# Patient Record
Sex: FEMALE | Race: WHITE | NOT HISPANIC OR LATINO | Employment: FULL TIME | URBAN - METROPOLITAN AREA
[De-identification: names, ages, dates, MRNs, and addresses within clinical notes are randomized per-mention and may not be internally consistent; named-entity substitution may affect disease eponyms.]

---

## 2017-01-04 ENCOUNTER — OFFICE VISIT - HEALTHEAST (OUTPATIENT)
Dept: FAMILY MEDICINE | Facility: CLINIC | Age: 37
End: 2017-01-04

## 2017-01-04 ASSESSMENT — MIFFLIN-ST. JEOR: SCORE: 1344.46

## 2017-01-05 ENCOUNTER — COMMUNICATION - HEALTHEAST (OUTPATIENT)
Dept: LAB | Facility: CLINIC | Age: 37
End: 2017-01-05

## 2017-01-24 ENCOUNTER — COMMUNICATION - HEALTHEAST (OUTPATIENT)
Dept: FAMILY MEDICINE | Facility: CLINIC | Age: 37
End: 2017-01-24

## 2017-01-24 ENCOUNTER — AMBULATORY - HEALTHEAST (OUTPATIENT)
Dept: FAMILY MEDICINE | Facility: CLINIC | Age: 37
End: 2017-01-24

## 2017-01-24 ENCOUNTER — AMBULATORY - HEALTHEAST (OUTPATIENT)
Dept: LAB | Facility: CLINIC | Age: 37
End: 2017-01-24

## 2017-01-24 DIAGNOSIS — R82.90 ABNORMAL URINALYSIS: ICD-10-CM

## 2017-01-24 DIAGNOSIS — R31.9 HEMATURIA: ICD-10-CM

## 2017-02-08 ENCOUNTER — AMBULATORY - HEALTHEAST (OUTPATIENT)
Dept: LAB | Facility: CLINIC | Age: 37
End: 2017-02-08

## 2017-02-08 DIAGNOSIS — R82.90 ABNORMAL URINALYSIS: ICD-10-CM

## 2017-03-10 ENCOUNTER — COMMUNICATION - HEALTHEAST (OUTPATIENT)
Dept: FAMILY MEDICINE | Facility: CLINIC | Age: 37
End: 2017-03-10

## 2017-05-22 ENCOUNTER — OFFICE VISIT - HEALTHEAST (OUTPATIENT)
Dept: FAMILY MEDICINE | Facility: CLINIC | Age: 37
End: 2017-05-22

## 2017-05-22 ENCOUNTER — COMMUNICATION - HEALTHEAST (OUTPATIENT)
Dept: FAMILY MEDICINE | Facility: CLINIC | Age: 37
End: 2017-05-22

## 2017-05-22 DIAGNOSIS — R59.1 LYMPHADENOPATHY: ICD-10-CM

## 2017-05-22 DIAGNOSIS — H65.90 SEROUS OTITIS MEDIA: ICD-10-CM

## 2017-05-22 ASSESSMENT — MIFFLIN-ST. JEOR: SCORE: 1344.01

## 2017-12-08 ENCOUNTER — COMMUNICATION - HEALTHEAST (OUTPATIENT)
Dept: FAMILY MEDICINE | Facility: CLINIC | Age: 37
End: 2017-12-08

## 2018-01-05 ENCOUNTER — COMMUNICATION - HEALTHEAST (OUTPATIENT)
Dept: FAMILY MEDICINE | Facility: CLINIC | Age: 38
End: 2018-01-05

## 2018-03-06 ENCOUNTER — COMMUNICATION - HEALTHEAST (OUTPATIENT)
Dept: TELEHEALTH | Facility: CLINIC | Age: 38
End: 2018-03-06

## 2018-03-06 ENCOUNTER — COMMUNICATION - HEALTHEAST (OUTPATIENT)
Dept: HEALTH INFORMATION MANAGEMENT | Facility: CLINIC | Age: 38
End: 2018-03-06

## 2018-05-04 ENCOUNTER — OFFICE VISIT - HEALTHEAST (OUTPATIENT)
Dept: FAMILY MEDICINE | Facility: CLINIC | Age: 38
End: 2018-05-04

## 2018-05-04 DIAGNOSIS — N39.0 ACUTE UTI (URINARY TRACT INFECTION): ICD-10-CM

## 2018-06-25 ENCOUNTER — COMMUNICATION - HEALTHEAST (OUTPATIENT)
Dept: FAMILY MEDICINE | Facility: CLINIC | Age: 38
End: 2018-06-25

## 2018-06-26 ENCOUNTER — OFFICE VISIT - HEALTHEAST (OUTPATIENT)
Dept: FAMILY MEDICINE | Facility: CLINIC | Age: 38
End: 2018-06-26

## 2018-06-26 DIAGNOSIS — Z34.90 SUPERVISION OF NORMAL PREGNANCY: ICD-10-CM

## 2018-06-26 DIAGNOSIS — N91.2 AMENORRHEA: ICD-10-CM

## 2018-06-26 LAB — HCG UR QL: POSITIVE

## 2018-07-03 ENCOUNTER — COMMUNICATION - HEALTHEAST (OUTPATIENT)
Dept: FAMILY MEDICINE | Facility: CLINIC | Age: 38
End: 2018-07-03

## 2018-07-03 ENCOUNTER — PRENATAL OFFICE VISIT - HEALTHEAST (OUTPATIENT)
Dept: FAMILY MEDICINE | Facility: CLINIC | Age: 38
End: 2018-07-03

## 2018-07-03 ENCOUNTER — HOSPITAL ENCOUNTER (OUTPATIENT)
Dept: ULTRASOUND IMAGING | Facility: CLINIC | Age: 38
Discharge: HOME | End: 2018-07-03
Attending: FAMILY MEDICINE

## 2018-07-03 DIAGNOSIS — O20.9 VAGINAL BLEEDING IN PREGNANCY, FIRST TRIMESTER: ICD-10-CM

## 2018-07-03 LAB — HCG SERPL-ACNC: ABNORMAL MLU/ML (ref 0–4)

## 2018-07-05 ENCOUNTER — AMBULATORY - HEALTHEAST (OUTPATIENT)
Dept: LAB | Facility: CLINIC | Age: 38
End: 2018-07-05

## 2018-07-05 DIAGNOSIS — O20.9 VAGINAL BLEEDING IN PREGNANCY, FIRST TRIMESTER: ICD-10-CM

## 2018-07-05 LAB — HCG SERPL-ACNC: ABNORMAL MLU/ML (ref 0–4)

## 2018-07-10 ENCOUNTER — AMBULATORY - HEALTHEAST (OUTPATIENT)
Dept: FAMILY MEDICINE | Facility: CLINIC | Age: 38
End: 2018-07-10

## 2018-07-10 DIAGNOSIS — O20.9 VAGINAL BLEEDING IN PREGNANCY, FIRST TRIMESTER: ICD-10-CM

## 2018-07-18 ENCOUNTER — HOSPITAL ENCOUNTER (OUTPATIENT)
Dept: ULTRASOUND IMAGING | Facility: CLINIC | Age: 38
Discharge: HOME | End: 2018-07-18
Attending: FAMILY MEDICINE

## 2018-07-18 DIAGNOSIS — O20.9 VAGINAL BLEEDING IN PREGNANCY, FIRST TRIMESTER: ICD-10-CM

## 2018-07-20 ENCOUNTER — COMMUNICATION - HEALTHEAST (OUTPATIENT)
Dept: SCHEDULING | Facility: CLINIC | Age: 38
End: 2018-07-20

## 2018-07-26 ENCOUNTER — COMMUNICATION - HEALTHEAST (OUTPATIENT)
Dept: FAMILY MEDICINE | Facility: CLINIC | Age: 38
End: 2018-07-26

## 2018-08-03 ENCOUNTER — PRENATAL OFFICE VISIT - HEALTHEAST (OUTPATIENT)
Dept: FAMILY MEDICINE | Facility: CLINIC | Age: 38
End: 2018-08-03

## 2018-08-03 DIAGNOSIS — Z34.90 SUPERVISION OF NORMAL PREGNANCY: ICD-10-CM

## 2018-08-03 LAB
ALBUMIN UR-MCNC: ABNORMAL MG/DL
APPEARANCE UR: CLEAR
BASOPHILS # BLD AUTO: 0 THOU/UL (ref 0–0.2)
BASOPHILS NFR BLD AUTO: 0 % (ref 0–2)
BILIRUB UR QL STRIP: NEGATIVE
COLOR UR AUTO: YELLOW
EOSINOPHIL # BLD AUTO: 0.1 THOU/UL (ref 0–0.4)
EOSINOPHIL NFR BLD AUTO: 1 % (ref 0–6)
ERYTHROCYTE [DISTWIDTH] IN BLOOD BY AUTOMATED COUNT: 13.1 % (ref 11–14.5)
GLUCOSE UR STRIP-MCNC: NEGATIVE MG/DL
HCT VFR BLD AUTO: 37.7 % (ref 35–47)
HGB BLD-MCNC: 12.2 G/DL (ref 12–16)
HGB UR QL STRIP: ABNORMAL
HIV 1+2 AB+HIV1 P24 AG SERPL QL IA: NEGATIVE
KETONES UR STRIP-MCNC: NEGATIVE MG/DL
LEUKOCYTE ESTERASE UR QL STRIP: NEGATIVE
LYMPHOCYTES # BLD AUTO: 1.5 THOU/UL (ref 0.8–4.4)
LYMPHOCYTES NFR BLD AUTO: 24 % (ref 20–40)
MCH RBC QN AUTO: 28.8 PG (ref 27–34)
MCHC RBC AUTO-ENTMCNC: 32.4 G/DL (ref 32–36)
MCV RBC AUTO: 89 FL (ref 80–100)
MONOCYTES # BLD AUTO: 0.5 THOU/UL (ref 0–0.9)
MONOCYTES NFR BLD AUTO: 8 % (ref 2–10)
NEUTROPHILS # BLD AUTO: 4.3 THOU/UL (ref 2–7.7)
NEUTROPHILS NFR BLD AUTO: 68 % (ref 50–70)
NITRATE UR QL: NEGATIVE
PH UR STRIP: 6.5 [PH] (ref 5–8)
PLATELET # BLD AUTO: 300 THOU/UL (ref 140–440)
PMV BLD AUTO: 10.4 FL (ref 8.5–12.5)
RBC # BLD AUTO: 4.24 MILL/UL (ref 3.8–5.4)
SP GR UR STRIP: >=1.03 (ref 1–1.03)
TSH SERPL DL<=0.005 MIU/L-ACNC: 0.14 UIU/ML (ref 0.3–5)
UROBILINOGEN UR STRIP-ACNC: ABNORMAL
WBC: 6.4 THOU/UL (ref 4–11)

## 2018-08-04 LAB
ANTIBODY SCREEN: NEGATIVE
BACTERIA SPEC CULT: NO GROWTH
HBV SURFACE AG SERPL QL IA: NEGATIVE
T PALLIDUM AB SER QL: NEGATIVE

## 2018-08-06 LAB
25(OH)D3 SERPL-MCNC: 23.2 NG/ML (ref 30–80)
25(OH)D3 SERPL-MCNC: 23.2 NG/ML (ref 30–80)
ABO/RH(D): NORMAL
ABORH REPEAT: NORMAL
C TRACH DNA SPEC QL PROBE+SIG AMP: NEGATIVE
HPV SOURCE: NORMAL
HUMAN PAPILLOMA VIRUS 16 DNA: NEGATIVE
HUMAN PAPILLOMA VIRUS 18 DNA: NEGATIVE
HUMAN PAPILLOMA VIRUS FINAL DIAGNOSIS: NORMAL
HUMAN PAPILLOMA VIRUS OTHER HR: NEGATIVE
N GONORRHOEA DNA SPEC QL NAA+PROBE: NEGATIVE
RUBV IGG SERPL QL IA: POSITIVE
SPECIMEN DESCRIPTION: NORMAL

## 2018-09-05 ENCOUNTER — PRENATAL OFFICE VISIT - HEALTHEAST (OUTPATIENT)
Dept: FAMILY MEDICINE | Facility: CLINIC | Age: 38
End: 2018-09-05

## 2018-09-05 ENCOUNTER — RECORDS - HEALTHEAST (OUTPATIENT)
Dept: ADMINISTRATIVE | Facility: OTHER | Age: 38
End: 2018-09-05

## 2018-09-05 DIAGNOSIS — R94.6 ABNORMAL FINDING ON THYROID FUNCTION TEST: ICD-10-CM

## 2018-09-05 DIAGNOSIS — Z34.90 SUPERVISION OF NORMAL PREGNANCY: ICD-10-CM

## 2018-09-05 LAB
ALBUMIN UR-MCNC: NEGATIVE MG/DL
APPEARANCE UR: CLEAR
BILIRUB UR QL STRIP: NEGATIVE
COLOR UR AUTO: YELLOW
GLUCOSE UR STRIP-MCNC: NEGATIVE MG/DL
HGB UR QL STRIP: NEGATIVE
KETONES UR STRIP-MCNC: NEGATIVE MG/DL
LEUKOCYTE ESTERASE UR QL STRIP: NEGATIVE
NITRATE UR QL: NEGATIVE
PH UR STRIP: 6 [PH] (ref 5–8)
SP GR UR STRIP: >=1.03 (ref 1–1.03)
UROBILINOGEN UR STRIP-ACNC: NORMAL

## 2018-09-06 ENCOUNTER — TRANSFERRED RECORDS (OUTPATIENT)
Dept: HEALTH INFORMATION MANAGEMENT | Facility: CLINIC | Age: 38
End: 2018-09-06

## 2018-09-06 LAB — TSH SERPL DL<=0.005 MIU/L-ACNC: 1.25 UIU/ML (ref 0.3–5)

## 2018-09-12 ENCOUNTER — COMMUNICATION - HEALTHEAST (OUTPATIENT)
Dept: FAMILY MEDICINE | Facility: CLINIC | Age: 38
End: 2018-09-12

## 2018-09-19 ENCOUNTER — PRE VISIT (OUTPATIENT)
Dept: MATERNAL FETAL MEDICINE | Facility: CLINIC | Age: 38
End: 2018-09-19

## 2018-09-25 ENCOUNTER — OFFICE VISIT (OUTPATIENT)
Dept: MATERNAL FETAL MEDICINE | Facility: CLINIC | Age: 38
End: 2018-09-25
Attending: FAMILY MEDICINE
Payer: COMMERCIAL

## 2018-09-25 ENCOUNTER — HOSPITAL ENCOUNTER (OUTPATIENT)
Dept: ULTRASOUND IMAGING | Facility: CLINIC | Age: 38
Discharge: HOME | End: 2018-09-25
Attending: FAMILY MEDICINE | Admitting: FAMILY MEDICINE
Payer: COMMERCIAL

## 2018-09-25 ENCOUNTER — RECORDS - HEALTHEAST (OUTPATIENT)
Dept: ADMINISTRATIVE | Facility: OTHER | Age: 38
End: 2018-09-25

## 2018-09-25 DIAGNOSIS — O26.90 PREGNANCY RELATED CONDITION, ANTEPARTUM: ICD-10-CM

## 2018-09-25 DIAGNOSIS — O09.522 SUPERVISION OF ELDERLY MULTIGRAVIDA IN SECOND TRIMESTER: Primary | ICD-10-CM

## 2018-09-25 DIAGNOSIS — O44.02 PLACENTA PREVIA IN SECOND TRIMESTER: ICD-10-CM

## 2018-09-25 DIAGNOSIS — O09.522 ELDERLY MULTIGRAVIDA IN SECOND TRIMESTER: Primary | ICD-10-CM

## 2018-09-25 PROCEDURE — 96040 ZZH GENETIC COUNSELING, EACH 30 MINUTES: CPT | Mod: ZF | Performed by: GENETIC COUNSELOR, MS

## 2018-09-25 PROCEDURE — 76811 OB US DETAILED SNGL FETUS: CPT

## 2018-09-25 NOTE — PROGRESS NOTES
"Please see \"Imaging\" tab under \"Chart Review\" for details of today's visit.    Honorio Fitzgerald    "

## 2018-09-25 NOTE — MR AVS SNAPSHOT
"              After Visit Summary   2018    Yeimy Blackmon    MRN: [id number]           Patient Information     Date Of Birth          1980        Visit Information        Provider Department      2018 2:45 PM Honorio Fitzgerald MD [facility] Maternal Fetal Medicine [facility]        Today's Diagnoses     Elderly multigravida in second trimester    -  1    Placenta previa in second trimester           Follow-ups after your visit        Who to contact     If you have questions or need follow up information about today's clinic visit or your schedule please contact [facility] MATERNAL FETAL MEDICINE [facility] directly at [phone].  Normal or non-critical lab and imaging results will be communicated to you by Blog Talk Radiohart, letter or phone within 4 business days after the clinic has received the results. If you do not hear from us within 7 days, please contact the clinic through Dandeliont or phone. If you have a critical or abnormal lab result, we will notify you by phone as soon as possible.  Submit refill requests through PlusFourSix or call your pharmacy and they will forward the refill request to us. Please allow 3 business days for your refill to be completed.          Additional Information About Your Visit        MyChart Information     [email or web address] lets you send messages to your doctor, view your test results, renew your prescriptions, schedule appointments and more. To sign up, go to [email or web address] . Click on \"Log in\" on the left side of the screen, which will take you to the Welcome page. Then click on \"Sign up Now\" on the right side of the page.     You will be asked to enter the access code listed below, as well as some personal information. Please follow the directions to create your username and password.     Your access code is: [email or web address]  Expires: 2018 12:31 PM     Your access code will  in 90 days. If you need help or a new code, please call your [facility] clinic or [phone].      "   Care EveryWhere ID     This is your Care EveryWhere ID. This could be used by other organizations to access your [place] medical records  [id number]        Your Vitals Were     Last Period                   05/16/2018            Blood Pressure from Last 3 Encounters:   No data found for BP    Weight from Last 3 Encounters:   No data found for Wt              Today, you had the following     No orders found for display       Primary Care Provider Office Phone # Fax #    [facility] [phone] [address] [id number]        Equal Access to Services     [facility] : Hadii aad ku hadasho Soomaali, waaxda luqadaha, qaybta kaalmada adeegyada, waxtena cornelius . So [facility] [place] [phone].    ATENCIÓN: Si habla español, tiene a shanks disposición servicios gratuitos de asistencia lingüística. Llame al [phone].    We comply with applicable federal civil rights laws and [place] laws. We do not discriminate on the basis of race, color, national origin, age, disability, sex, sexual orientation, or gender identity.            Thank you!     Thank you for choosing [facility]  for your care. Our goal is always to provide you with excellent care. Hearing back from our patients is one way we can continue to improve our services. Please take a few minutes to complete the written survey that you may receive in the mail after your visit with us. Thank you!             Your Updated Medication List - Protect others around you: Learn how to safely use, store and throw away your medicines at www.disposemymeds.org.      Notice  As of 9/25/2018  5:14 PM    You have not been prescribed any medications.

## 2018-09-25 NOTE — PROGRESS NOTES
"[facility]  Genetic Counseling Consult    Patient:  Yeimy Blackmon YOB: 1980   Date of Service:  18      Yeimy Blackmon was seen at the [facility] for genetic consultation as part of her appointment for comprehensive ultrasound.  The indication for genetic counseling is advanced maternal age.  Yeimy was accompanied to her appointment today by her  Carl.       Impression/Plan:   1. Yeimy has declined serum screening in this pregnancy.    2. Yeimy had a comprehensive (level II) ultrasound today, the results of which were normal.  Please see the ultrasound report for further details.    Pregnancy History:   /Parity:    Age at Delivery: 38 year old  DANIEL: 2019, by Ultrasound  Gestational Age: 19w3d    No significant complications or exposures were reported in the current pregnancy.    Yeimy hernandez pregnancy history is significant for 1 prior full term pregnancy with no reported complications.    Medical History:   Yeimy hernandez reported medical history is not expected to impact pregnancy management or risks to fetal development.       Family History:   A three-generation pedigree was obtained, and is scanned under the  Media  tab.   The following significant findings were reported by Yeimy:    Carl's brother was born with reported \"water on the brain\" requiring unknown procedures to address.  He is currently 35 years old and healthy with no known complications.  We discussed that \"water on the brain\" can be referring to an enlargement of the fluid filled spaces within the brain, which results in pressure on the surrounding brain tissue.  Treatments can include procedures things like shunts to allow the excess fluid to drain.  We discussed that these sorts of physical differences can be associated with genetic conditions and also occur completely sporadically, without an underlying genetic cause.  We " discussed that because Carl's brother is otherwise healthy with no known complications, it is possible that his condition was a sporadic occurrence.  We discussed that the risks for Yeimy's ongoing pregnancy to be similarly affected is likely low, and that today's ultrasound will assess for excess fluid accumulation in the brain.      Carl's sister had a son die at approximately 2 months of age due to complications of extreme prematurity, born at ~26 weeks gestational age.  We discussed that there are many factors that can cause early delivery, but that Yeimy's pregnancy is not considered increased risk for premature delivery based on this history.      Otherwise, the reported family history is negative for multiple miscarriages, stillbirths, birth defects, cognitive impairment, known genetic conditions, and consanguinity.       Carrier Screening:       Carrier screening was not discussed today.       Risk Assessment for Chromosome Conditions:   We explained that the risk for fetal chromosome abnormalities increases with maternal age. We discussed specific features of common chromosome abnormalities, including Down syndrome, trisomy 13, trisomy 18, and sex chromosome trisomies.      - At age 38 at midtrimester, the risk to have a baby with Down syndrome is 1 in 168.     - At age 38 at midtrimester, the risk to have a baby with any chromosome abnormality is 1 in 82.     - At age 38 at delivery, the risk to have a baby with Down syndrome is 1 in 227.    - At age 38 at delivery, the risk to have a baby with any chromosome abnormality is 1 in 129.       Yeimy did not have maternal serum screening earlier in pregnancy.      Testing Options:   We discussed the following options:   Non-invasive Prenatal Testing (NIPT)    Maternal plasma cell-free DNA testing; first trimester ultrasound with nuchal translucency and nasal bone assessment is recommended, when appropriate    Screens for fetal trisomy 21, trisomy 13, trisomy  18, and sex chromosome aneuploidy    Cannot screen for open neural tube defects; maternal serum AFP after 15 weeks is recommended       Genetic Amniocentesis    Invasive procedure typically performed in the second trimester by which amniotic fluid is obtained for the purpose of chromosome analysis and/or other prenatal genetic analysis    Diagnostic results; >99% sensitivity for fetal chromosome abnormalities    AFAFP measurement tests for open neural tube defects       Comprehensive (Level II) ultrasound:     Detailed ultrasound performed between 18-22 weeks gestation    Screen for major birth defects and markers for aneuploidy    We reviewed the benefits and limitations of this testing.  Screening tests provide a risk assessment specific to the pregnancy for certain fetal chromosome abnormalities, but cannot definitively diagnose or exclude a fetal chromosome abnormality.  Follow-up genetic counseling and consideration of diagnostic testing is recommended with any abnormal screening result. Diagnostic tests carry inherent risks- including risk of miscarriage- that require careful consideration.  These tests can detect fetal chromosome abnormalities with greater than 99% certainty.  Results can be compromised by maternal cell contamination or mosaicism, and are limited by the resolution of cytogenetic G-banding technology.  There is no screening nor diagnostic test that can detect all forms of birth defects or mental disability.    It was a pleasure to be involved with Yeimy [facility]. Face-to-face time of the meeting was 25 minutes.    Brian Blount MS, [facility]  Licensed Genetic Counselor  Phone: [phone]  Pager: [phone]

## 2018-09-25 NOTE — MR AVS SNAPSHOT
"              After Visit Summary   2018    Yeimy Blackmon    MRN: [id number]           Patient Information     Date Of Birth          1980        Visit Information        Provider Department      2018 1:30 PM Brian Blount [facility] Maternal Fetal Medicine [facility]        Today's Diagnoses     Supervision of elderly multigravida in second trimester    -  1    Pregnancy related condition, antepartum           Follow-ups after your visit        Who to contact     If you have questions or need follow up information about today's clinic visit or your schedule please contact [facility] MATERNAL FETAL MEDICINE [facility] directly at [phone].  Normal or non-critical lab and imaging results will be communicated to you by ABOVE Solutionshart, letter or phone within 4 business days after the clinic has received the results. If you do not hear from us within 7 days, please contact the clinic through ABOVE Solutionshart or phone. If you have a critical or abnormal lab result, we will notify you by phone as soon as possible.  Submit refill requests through Tiger Pistol or call your pharmacy and they will forward the refill request to us. Please allow 3 business days for your refill to be completed.          Additional Information About Your Visit        MyChart Information     Tiger Pistol lets you send messages to your doctor, view your test results, renew your prescriptions, schedule appointments and more. To sign up, go to [email or web address] . Click on \"Log in\" on the left side of the screen, which will take you to the Welcome page. Then click on \"Sign up Now\" on the right side of the page.     You will be asked to enter the access code listed below, as well as some personal information. Please follow the directions to create your username and password.     Your access code is: [email or web address]  Expires: 2018 12:31 PM     Your access code will  in 90 days. If you need help or a new code, please call your [place] clinic " or [phone].        Care EveryWhere ID     This is your Care EveryWhere ID. This could be used by other organizations to access your [place] medical records  [id number]        Your Vitals Were     Last Period                   05/16/2018            Blood Pressure from Last 3 Encounters:   No data found for BP    Weight from Last 3 Encounters:   No data found for Wt              We Performed the Following     [facility] Genetic Counseling        Primary Care Provider Office Phone # Fax #    [facility] [phone] [address] [id number]        Equal Access to Services     GAMA TAPIA : Hadii aad ku hadasho Soomaali, waaxda luqadaha, qaybta kaalmada adeevanyaevan, radha cornelius . So [facility] [phone].    ATENCIÓN: Si habla español, tiene a shanks disposición servicios gratuitos de asistencia lingüística. Llame al [phone].    We comply with applicable federal civil rights laws and [place] laws. We do not discriminate on the basis of race, color, national origin, age, disability, sex, sexual orientation, or gender identity.            Thank you!     Thank you for choosing [facility]  for your care. Our goal is always to provide you with excellent care. Hearing back from our patients is one way we can continue to improve our services. Please take a few minutes to complete the written survey that you may receive in the mail after your visit with us. Thank you!             Your Updated Medication List - Protect others around you: Learn how to safely use, store and throw away your medicines at www.disposemymeds.org.      Notice  As of 9/25/2018  3:38 PM    You have not been prescribed any medications.

## 2018-09-26 ENCOUNTER — COMMUNICATION - HEALTHEAST (OUTPATIENT)
Dept: FAMILY MEDICINE | Facility: CLINIC | Age: 38
End: 2018-09-26

## 2018-10-01 ENCOUNTER — PRENATAL OFFICE VISIT - HEALTHEAST (OUTPATIENT)
Dept: FAMILY MEDICINE | Facility: CLINIC | Age: 38
End: 2018-10-01

## 2018-10-01 DIAGNOSIS — Z34.90 SUPERVISION OF NORMAL PREGNANCY: ICD-10-CM

## 2018-10-29 ENCOUNTER — PRENATAL OFFICE VISIT - HEALTHEAST (OUTPATIENT)
Dept: FAMILY MEDICINE | Facility: CLINIC | Age: 38
End: 2018-10-29

## 2018-10-29 DIAGNOSIS — Z34.90 SUPERVISION OF NORMAL PREGNANCY: ICD-10-CM

## 2018-10-29 DIAGNOSIS — O44.20 MARGINAL PLACENTA PREVIA: ICD-10-CM

## 2018-10-29 LAB
ALBUMIN UR-MCNC: NEGATIVE MG/DL
APPEARANCE UR: CLEAR
BACTERIA #/AREA URNS HPF: ABNORMAL HPF
BILIRUB UR QL STRIP: NEGATIVE
COLOR UR AUTO: YELLOW
FASTING STATUS PATIENT QL REPORTED: ABNORMAL
GLUCOSE 1H P 50 G GLC PO SERPL-MCNC: 153 MG/DL (ref 70–139)
GLUCOSE UR STRIP-MCNC: NEGATIVE MG/DL
HGB BLD-MCNC: 11 G/DL (ref 12–16)
HGB UR QL STRIP: NEGATIVE
KETONES UR STRIP-MCNC: ABNORMAL MG/DL
LEUKOCYTE ESTERASE UR QL STRIP: ABNORMAL
NITRATE UR QL: NEGATIVE
PH UR STRIP: 7 [PH] (ref 5–8)
RBC #/AREA URNS AUTO: ABNORMAL HPF
SP GR UR STRIP: 1.02 (ref 1–1.03)
SQUAMOUS #/AREA URNS AUTO: ABNORMAL LPF
UROBILINOGEN UR STRIP-ACNC: ABNORMAL
WBC #/AREA URNS AUTO: ABNORMAL HPF

## 2018-10-30 ENCOUNTER — AMBULATORY - HEALTHEAST (OUTPATIENT)
Dept: FAMILY MEDICINE | Facility: CLINIC | Age: 38
End: 2018-10-30

## 2018-10-30 DIAGNOSIS — Z34.90 SUPERVISION OF NORMAL PREGNANCY: ICD-10-CM

## 2018-10-30 LAB — BACTERIA SPEC CULT: NO GROWTH

## 2018-10-31 LAB
25(OH)D3 SERPL-MCNC: 29.1 NG/ML (ref 30–80)
25(OH)D3 SERPL-MCNC: 29.1 NG/ML (ref 30–80)

## 2018-11-02 ENCOUNTER — AMBULATORY - HEALTHEAST (OUTPATIENT)
Dept: LAB | Facility: CLINIC | Age: 38
End: 2018-11-02

## 2018-11-02 DIAGNOSIS — Z34.90 SUPERVISION OF NORMAL PREGNANCY: ICD-10-CM

## 2018-11-02 LAB
FASTING STATUS PATIENT QL REPORTED: NORMAL
GLUCOSE 1H P 100 G GLC PO SERPL-MCNC: 113 MG/DL
GLUCOSE 2H P 100 G GLC PO SERPL-MCNC: 89 MG/DL
GLUCOSE 3H P 100 G GLC PO SERPL-MCNC: 34 MG/DL
GLUCOSE P FAST SERPL-MCNC: 76 MG/DL

## 2018-11-14 ENCOUNTER — PRENATAL OFFICE VISIT - HEALTHEAST (OUTPATIENT)
Dept: FAMILY MEDICINE | Facility: CLINIC | Age: 38
End: 2018-11-14

## 2018-11-14 DIAGNOSIS — O44.20 MARGINAL PLACENTA PREVIA: ICD-10-CM

## 2018-11-14 DIAGNOSIS — Z34.83 ENCOUNTER FOR SUPERVISION OF OTHER NORMAL PREGNANCY IN THIRD TRIMESTER: ICD-10-CM

## 2018-11-28 ENCOUNTER — HOSPITAL ENCOUNTER (OUTPATIENT)
Dept: ULTRASOUND IMAGING | Facility: CLINIC | Age: 38
Discharge: HOME | End: 2018-11-28
Attending: FAMILY MEDICINE

## 2018-11-28 ENCOUNTER — PRENATAL OFFICE VISIT - HEALTHEAST (OUTPATIENT)
Dept: FAMILY MEDICINE | Facility: CLINIC | Age: 38
End: 2018-11-28

## 2018-11-28 DIAGNOSIS — Z34.83 ENCOUNTER FOR SUPERVISION OF OTHER NORMAL PREGNANCY IN THIRD TRIMESTER: ICD-10-CM

## 2018-11-28 DIAGNOSIS — Z34.90 SUPERVISION OF NORMAL PREGNANCY: ICD-10-CM

## 2018-11-28 DIAGNOSIS — O44.20 MARGINAL PLACENTA PREVIA: ICD-10-CM

## 2018-11-29 LAB — T PALLIDUM AB SER QL: NEGATIVE

## 2018-12-12 ENCOUNTER — PRENATAL OFFICE VISIT - HEALTHEAST (OUTPATIENT)
Dept: FAMILY MEDICINE | Facility: CLINIC | Age: 38
End: 2018-12-12

## 2018-12-12 DIAGNOSIS — Z34.83 ENCOUNTER FOR SUPERVISION OF OTHER NORMAL PREGNANCY IN THIRD TRIMESTER: ICD-10-CM

## 2018-12-26 ENCOUNTER — PRENATAL OFFICE VISIT - HEALTHEAST (OUTPATIENT)
Dept: FAMILY MEDICINE | Facility: CLINIC | Age: 38
End: 2018-12-26

## 2018-12-26 DIAGNOSIS — Z34.83 ENCOUNTER FOR SUPERVISION OF OTHER NORMAL PREGNANCY IN THIRD TRIMESTER: ICD-10-CM

## 2018-12-26 DIAGNOSIS — O44.20 MARGINAL PLACENTA PREVIA: ICD-10-CM

## 2019-01-08 ENCOUNTER — HOSPITAL ENCOUNTER (OUTPATIENT)
Dept: ULTRASOUND IMAGING | Facility: CLINIC | Age: 39
Discharge: HOME | End: 2019-01-08
Attending: FAMILY MEDICINE

## 2019-01-08 DIAGNOSIS — Z34.83 ENCOUNTER FOR SUPERVISION OF OTHER NORMAL PREGNANCY IN THIRD TRIMESTER: ICD-10-CM

## 2019-01-08 DIAGNOSIS — O44.20 MARGINAL PLACENTA PREVIA: ICD-10-CM

## 2019-01-09 ENCOUNTER — PRENATAL OFFICE VISIT - HEALTHEAST (OUTPATIENT)
Dept: FAMILY MEDICINE | Facility: CLINIC | Age: 39
End: 2019-01-09

## 2019-01-09 DIAGNOSIS — Z34.83 ENCOUNTER FOR SUPERVISION OF OTHER NORMAL PREGNANCY IN THIRD TRIMESTER: ICD-10-CM

## 2019-01-09 DIAGNOSIS — O44.20 MARGINAL PLACENTA: ICD-10-CM

## 2019-01-11 ENCOUNTER — COMMUNICATION - HEALTHEAST (OUTPATIENT)
Dept: FAMILY MEDICINE | Facility: CLINIC | Age: 39
End: 2019-01-11

## 2019-01-11 DIAGNOSIS — Z34.83 ENCOUNTER FOR SUPERVISION OF OTHER NORMAL PREGNANCY IN THIRD TRIMESTER: ICD-10-CM

## 2019-01-16 ENCOUNTER — RECORDS - HEALTHEAST (OUTPATIENT)
Dept: ADMINISTRATIVE | Facility: OTHER | Age: 39
End: 2019-01-16

## 2019-01-23 ENCOUNTER — RECORDS - HEALTHEAST (OUTPATIENT)
Dept: FAMILY MEDICINE | Facility: CLINIC | Age: 39
End: 2019-01-23

## 2019-01-23 ENCOUNTER — COMMUNICATION - HEALTHEAST (OUTPATIENT)
Dept: FAMILY MEDICINE | Facility: CLINIC | Age: 39
End: 2019-01-23

## 2019-01-25 ENCOUNTER — PRENATAL OFFICE VISIT - HEALTHEAST (OUTPATIENT)
Dept: FAMILY MEDICINE | Facility: CLINIC | Age: 39
End: 2019-01-25

## 2019-01-25 DIAGNOSIS — Z34.83 ENCOUNTER FOR SUPERVISION OF OTHER NORMAL PREGNANCY IN THIRD TRIMESTER: ICD-10-CM

## 2019-01-25 LAB — HGB BLD-MCNC: 11.9 G/DL (ref 12–16)

## 2019-01-27 LAB
ALLERGIC TO PENICILLIN: NO
GP B STREP DNA SPEC QL NAA+PROBE: NEGATIVE

## 2019-01-30 ENCOUNTER — PRENATAL OFFICE VISIT - HEALTHEAST (OUTPATIENT)
Dept: FAMILY MEDICINE | Facility: CLINIC | Age: 39
End: 2019-01-30

## 2019-01-30 DIAGNOSIS — Z34.83 ENCOUNTER FOR SUPERVISION OF OTHER NORMAL PREGNANCY IN THIRD TRIMESTER: ICD-10-CM

## 2019-02-03 ENCOUNTER — ANESTHESIA - HEALTHEAST (OUTPATIENT)
Dept: OBGYN | Facility: CLINIC | Age: 39
End: 2019-02-03

## 2019-02-03 ENCOUNTER — COMMUNICATION - HEALTHEAST (OUTPATIENT)
Dept: SCHEDULING | Facility: CLINIC | Age: 39
End: 2019-02-03

## 2019-02-11 ENCOUNTER — COMMUNICATION - HEALTHEAST (OUTPATIENT)
Dept: FAMILY MEDICINE | Facility: CLINIC | Age: 39
End: 2019-02-11

## 2019-02-13 ENCOUNTER — COMMUNICATION - HEALTHEAST (OUTPATIENT)
Dept: FAMILY MEDICINE | Facility: CLINIC | Age: 39
End: 2019-02-13

## 2019-02-21 ENCOUNTER — AMBULATORY - HEALTHEAST (OUTPATIENT)
Dept: PEDIATRICS | Facility: CLINIC | Age: 39
End: 2019-02-21

## 2019-02-23 ENCOUNTER — AMBULATORY - HEALTHEAST (OUTPATIENT)
Dept: PEDIATRICS | Facility: CLINIC | Age: 39
End: 2019-02-23

## 2019-03-04 ENCOUNTER — COMMUNICATION - HEALTHEAST (OUTPATIENT)
Dept: FAMILY MEDICINE | Facility: CLINIC | Age: 39
End: 2019-03-04

## 2019-03-04 DIAGNOSIS — B37.9 YEAST INFECTION: ICD-10-CM

## 2019-03-26 ENCOUNTER — AMBULATORY - HEALTHEAST (OUTPATIENT)
Dept: FAMILY MEDICINE | Facility: CLINIC | Age: 39
End: 2019-03-26

## 2019-03-26 ENCOUNTER — OFFICE VISIT - HEALTHEAST (OUTPATIENT)
Dept: FAMILY MEDICINE | Facility: CLINIC | Age: 39
End: 2019-03-26

## 2019-03-26 DIAGNOSIS — R82.90 ABNORMAL URINALYSIS: ICD-10-CM

## 2019-03-26 DIAGNOSIS — Z30.011 ENCOUNTER FOR INITIAL PRESCRIPTION OF CONTRACEPTIVE PILLS: ICD-10-CM

## 2019-03-26 LAB
ALBUMIN UR-MCNC: ABNORMAL MG/DL
APPEARANCE UR: CLEAR
BILIRUB UR QL STRIP: NEGATIVE
COLOR UR AUTO: YELLOW
GLUCOSE UR STRIP-MCNC: NEGATIVE MG/DL
HGB BLD-MCNC: 12.6 G/DL (ref 12–16)
HGB UR QL STRIP: ABNORMAL
KETONES UR STRIP-MCNC: ABNORMAL MG/DL
LEUKOCYTE ESTERASE UR QL STRIP: ABNORMAL
NITRATE UR QL: NEGATIVE
PH UR STRIP: 5.5 [PH] (ref 5–8)
SP GR UR STRIP: 1.02 (ref 1–1.03)
UROBILINOGEN UR STRIP-ACNC: ABNORMAL

## 2019-03-28 LAB
HPV SOURCE: NORMAL
HUMAN PAPILLOMA VIRUS 16 DNA: NEGATIVE
HUMAN PAPILLOMA VIRUS 18 DNA: NEGATIVE
HUMAN PAPILLOMA VIRUS FINAL DIAGNOSIS: NORMAL
HUMAN PAPILLOMA VIRUS OTHER HR: NEGATIVE
SPECIMEN DESCRIPTION: NORMAL

## 2019-04-19 ENCOUNTER — COMMUNICATION - HEALTHEAST (OUTPATIENT)
Dept: FAMILY MEDICINE | Facility: CLINIC | Age: 39
End: 2019-04-19

## 2019-06-05 ENCOUNTER — RECORDS - HEALTHEAST (OUTPATIENT)
Dept: ADMINISTRATIVE | Facility: OTHER | Age: 39
End: 2019-06-05

## 2019-07-10 ENCOUNTER — RECORDS - HEALTHEAST (OUTPATIENT)
Dept: ADMINISTRATIVE | Facility: OTHER | Age: 39
End: 2019-07-10

## 2019-09-11 ENCOUNTER — RECORDS - HEALTHEAST (OUTPATIENT)
Dept: ADMINISTRATIVE | Facility: OTHER | Age: 39
End: 2019-09-11

## 2019-10-02 ENCOUNTER — RECORDS - HEALTHEAST (OUTPATIENT)
Dept: ADMINISTRATIVE | Facility: OTHER | Age: 39
End: 2019-10-02

## 2019-12-27 ENCOUNTER — COMMUNICATION - HEALTHEAST (OUTPATIENT)
Dept: FAMILY MEDICINE | Facility: CLINIC | Age: 39
End: 2019-12-27

## 2020-01-07 ENCOUNTER — OFFICE VISIT - HEALTHEAST (OUTPATIENT)
Dept: FAMILY MEDICINE | Facility: CLINIC | Age: 40
End: 2020-01-07

## 2020-01-07 DIAGNOSIS — R30.0 DYSURIA: ICD-10-CM

## 2020-01-07 DIAGNOSIS — N92.6 IRREGULAR MENSES: ICD-10-CM

## 2020-01-07 DIAGNOSIS — M25.539 WRIST PAIN: ICD-10-CM

## 2020-01-07 LAB
ALBUMIN UR-MCNC: NEGATIVE MG/DL
APPEARANCE UR: CLEAR
BACTERIA #/AREA URNS HPF: ABNORMAL HPF
BASOPHILS # BLD AUTO: 0 THOU/UL (ref 0–0.2)
BASOPHILS NFR BLD AUTO: 0 % (ref 0–2)
BILIRUB UR QL STRIP: NEGATIVE
COLOR UR AUTO: YELLOW
EOSINOPHIL # BLD AUTO: 0.1 THOU/UL (ref 0–0.4)
EOSINOPHIL NFR BLD AUTO: 2 % (ref 0–6)
ERYTHROCYTE [DISTWIDTH] IN BLOOD BY AUTOMATED COUNT: 11.5 % (ref 11–14.5)
ERYTHROCYTE [SEDIMENTATION RATE] IN BLOOD BY WESTERGREN METHOD: 2 MM/HR (ref 0–20)
GLUCOSE UR STRIP-MCNC: NEGATIVE MG/DL
HCG UR QL: NEGATIVE
HCT VFR BLD AUTO: 40.8 % (ref 35–47)
HGB BLD-MCNC: 13.5 G/DL (ref 12–16)
HGB UR QL STRIP: ABNORMAL
KETONES UR STRIP-MCNC: NEGATIVE MG/DL
LEUKOCYTE ESTERASE UR QL STRIP: ABNORMAL
LYMPHOCYTES # BLD AUTO: 1.4 THOU/UL (ref 0.8–4.4)
LYMPHOCYTES NFR BLD AUTO: 27 % (ref 20–40)
MCH RBC QN AUTO: 30.6 PG (ref 27–34)
MCHC RBC AUTO-ENTMCNC: 33.2 G/DL (ref 32–36)
MCV RBC AUTO: 92 FL (ref 80–100)
MONOCYTES # BLD AUTO: 0.3 THOU/UL (ref 0–0.9)
MONOCYTES NFR BLD AUTO: 6 % (ref 2–10)
NEUTROPHILS # BLD AUTO: 3.4 THOU/UL (ref 2–7.7)
NEUTROPHILS NFR BLD AUTO: 66 % (ref 50–70)
NITRATE UR QL: NEGATIVE
PH UR STRIP: 6.5 [PH] (ref 5–8)
PLATELET # BLD AUTO: 308 THOU/UL (ref 140–440)
PMV BLD AUTO: 8.6 FL (ref 7–10)
RBC # BLD AUTO: 4.43 MILL/UL (ref 3.8–5.4)
RBC #/AREA URNS AUTO: ABNORMAL HPF
SP GR UR STRIP: 1.01 (ref 1–1.03)
SQUAMOUS #/AREA URNS AUTO: ABNORMAL LPF
UROBILINOGEN UR STRIP-ACNC: ABNORMAL
WBC #/AREA URNS AUTO: ABNORMAL HPF
WBC: 5.2 THOU/UL (ref 4–11)

## 2020-01-07 ASSESSMENT — MIFFLIN-ST. JEOR: SCORE: 1380.75

## 2020-01-08 LAB
ALBUMIN SERPL-MCNC: 4.2 G/DL (ref 3.5–5)
ALP SERPL-CCNC: 56 U/L (ref 45–120)
ALT SERPL W P-5'-P-CCNC: 17 U/L (ref 0–45)
ANION GAP SERPL CALCULATED.3IONS-SCNC: 12 MMOL/L (ref 5–18)
AST SERPL W P-5'-P-CCNC: 13 U/L (ref 0–40)
BILIRUB SERPL-MCNC: 0.3 MG/DL (ref 0–1)
BUN SERPL-MCNC: 13 MG/DL (ref 8–22)
C REACTIVE PROTEIN LHE: 0.2 MG/DL (ref 0–0.8)
CALCIUM SERPL-MCNC: 9.4 MG/DL (ref 8.5–10.5)
CHLORIDE BLD-SCNC: 106 MMOL/L (ref 98–107)
CO2 SERPL-SCNC: 23 MMOL/L (ref 22–31)
CREAT SERPL-MCNC: 0.86 MG/DL (ref 0.6–1.1)
FSH SERPL-ACNC: 19.2 MIU/ML
GFR SERPL CREATININE-BSD FRML MDRD: >60 ML/MIN/1.73M2
GLUCOSE BLD-MCNC: 92 MG/DL (ref 70–125)
LH SERPL-ACNC: 8.2 MIU/ML
POTASSIUM BLD-SCNC: 4.2 MMOL/L (ref 3.5–5)
PROLACTIN SERPL-MCNC: 11.3 NG/ML (ref 0–20)
PROT SERPL-MCNC: 7.2 G/DL (ref 6–8)
RHEUMATOID FACT SERPL-ACNC: <15 IU/ML (ref 0–30)
SODIUM SERPL-SCNC: 141 MMOL/L (ref 136–145)
T3 SERPL-MCNC: 109 NG/DL (ref 45–175)
T4 FREE SERPL-MCNC: 1.1 NG/DL (ref 0.7–1.8)
T4 TOTAL - HISTORICAL: 11.7 UG/DL (ref 4.5–13)
TSH SERPL DL<=0.005 MIU/L-ACNC: 1.55 UIU/ML (ref 0.3–5)
URATE SERPL-MCNC: 4.1 MG/DL (ref 2–7.5)

## 2020-01-09 ENCOUNTER — COMMUNICATION - HEALTHEAST (OUTPATIENT)
Dept: FAMILY MEDICINE | Facility: CLINIC | Age: 40
End: 2020-01-09

## 2020-01-09 DIAGNOSIS — N39.0 URINARY TRACT INFECTION WITHOUT HEMATURIA, SITE UNSPECIFIED: ICD-10-CM

## 2020-01-09 LAB
ANA SER QL: 0.8 U
B BURGDOR IGG+IGM SER QL: 0.21 INDEX VALUE
BACTERIA SPEC CULT: ABNORMAL
DHEA-S SERPL-MCNC: 78 UG/DL (ref 45–270)

## 2020-01-10 LAB — TESTOST SERPL-MCNC: 13 NG/DL (ref 14–53)

## 2020-03-16 ENCOUNTER — VIRTUAL VISIT (OUTPATIENT)
Dept: FAMILY MEDICINE | Facility: OTHER | Age: 40
End: 2020-03-16

## 2020-03-16 NOTE — PROGRESS NOTES
"Date: 2020 08:11:44  Clinician: Elsa Arana  Clinician NPI: [id number]  Patient: Yeimy Blackmon  Patient : 1980  Patient Address: [facility] [address], [place] [address]  Patient Phone: [phone]  Visit Protocol: URI  Patient Summary:  Yeimy is a 39 year old ( : 1980 ) female who initiated a Visit for COVID-19 (Coronavirus) evaluation and screening. When asked the question \"Please sign me up to receive news, health information and promotions. \", Yeimy responded \"No\".    Yeimy states her symptoms started gradually 3-6 days ago.   Her symptoms consist of a headache, rhinitis, facial pain or pressure, a sore throat, and a cough.   Symptom details     Nasal secretions: The color of her mucus is yellow.    Cough: Yeimy coughs every 5-10 minutes and her cough is not more bothersome at night. Phlegm comes into her throat when she coughs. She believes her cough is caused by post-nasal drip. The color of the phlegm is yellow.     Sore throat: Yeimy reports having mild throat pain (1-3 on a 10 point pain scale), does not have exudate on her tonsils, and can swallow liquids. The lymph nodes in her neck are not enlarged. A rash has not appeared on the skin since the sore throat started.     Facial pain or pressure: The facial pain or pressure feels worse when bending over or leaning forward.     Headache: She states the headache is moderate (4-6 on a 10 point pain scale).      Yeimy denies having ear pain, malaise, enlarged lymph nodes, myalgias, fever, chills, wheezing, nasal congestion, and teeth pain. She also denies having recent facial or sinus surgery in the past 60 days, double sickening (worsening symptoms after initial improvement), having a sinus infection within the past year, and taking antibiotic medication for the symptoms. She is not experiencing dyspnea.   Precipitating events  Within the past week, Yeimy has not been exposed to someone with strep throat. She has not " recently been exposed to someone with influenza. Yeimy has been in close contact with the following high risk individuals: children under the age of 5.   Pertinent COVID-19 (Coronavirus) information  Yeimy has not traveled internationally or to the areas where COVID-19 (Coronavirus) is widespread in the last 14 days before the start of her symptoms.   Yeimy has not had close contact with a suspected or laboratory-confirmed COVID-19 patient within 14 days of symptom onset.   Yeimy is not a healthcare worker and does not work in a healthcare facility.   Pertinent medical history  Yeimy does not get yeast infections when she takes antibiotics.   Yeimy does not need a return to work/school note.   Weight: 135 lbs   Yeimy does not smoke or use smokeless tobacco.   She denies pregnancy and denies breastfeeding. She has menstruated in the past month.   Weight: 135 lbs    MEDICATIONS: Tylenol Extra Strength oral, ibuprofen oral, ALLERGIES: NKDA  Clinician Response:  Dear Yeimy,  Based on the information provided, you have a viral upper respiratory infection, otherwise known as a cold. Symptoms vary from person to person, but can include sneezing, coughing, a runny nose, sore throat, and headache and range from mild to severe.  Unfortunately, there are no medications that can cure a cold, so treatment is focused on controlling symptoms as much as possible. Most people gradually feel better until symptoms are gone in 1-2 weeks.  Medication information  Because you have a viral infection, antibiotics will not help you get better. Treating a viral infection with antibiotics could actually make you feel worse.  I am prescribing:     Benzonatate (Tessalon Perles) 100 mg oral capsule. Take 1-2 capsules by mouth 3 times per day as needed for your cough. There are no refills with this prescription.   Unless you are allergic to the over-the-counter medication(s) below, I recommend using:       Acetaminophen (Tylenol or store  brand) oral tablet. Take 1-2 tablets by mouth every 4-6 hours to help with the discomfort.      Ibuprofen (Advil or store brand) 200 mg oral tablet. Take 1-3 tablets (200-600 mg) by mouth every 8 hours to help with the discomfort. Make sure to take the ibuprofen with food. Do not exceed 2400 mg in 24 hours.      Guaifenesin + dextromethorphan (Robitussin DM, Mucinex DM, or store brand).    A sinus irrigation kit such as Sinus Rinse, Neti Pot, SinuCleanse, or store brand. Be sure to use sterile or previously boiled water to prevent unwanted infections.      Oxymetazoline (Afrin or store brand) nasal spray. Use 1 spray in each nostril 2 times a day for a maximum of 3 days. Using this medication more frequently or longer than recommended may cause nasal congestion to reoccur or worsen. Sinus pressure occurs when the tissues lining your sinuses become swollen and inflamed. Afrin nasal spray decreases the swelling to provide the quickest and most effective relief from sinus pressure.      Over-the-counter medications do not require a prescription. Ask the pharmacist if you have any questions.  Self care  The following tips will keep you as comfortable as possible while you recover:     Rest    Drink plenty of water and other liquids    Take a hot shower to loosen congestion    Use throat lozenges    Gargle with warm salt water (1/4 teaspoon of salt per 8 ounce glass of water)    Suck on frozen items such as popsicles or ice cubes    Drink hot tea with lemon and honey    Take a spoonful of honey to reduce your cough     When to seek care  Please be seen in a clinic or urgent care if new symptoms develop, or symptoms become worse.  Call 911 or go to the emergency room if you feel that your throat is closing off, you suddenly develop a rash, you are unable to swallow fluids, you are drooling, or you are having difficulty breathing.  Additional treatment plan   Dear Yeimy,  Based on the information you have provided, it  does not appear you need Coronavirus (COVID-19) testing.   At this time, we recommend testing primarily for those people who have symptoms of cough and fever and have either traveled to a known area of infection or have been exposed to someone with laboratory confirmed Coronavirus by close contact.   Coronavirus - General Information:   The coronavirus infection starts within 14 days of an exposure.  Symptoms are those of a respiratory infection (such as fever, cough).   If you have not had symptoms by day 15, you should be considered uninfected by coronavirus.   Coronavirus - Symptoms:    The coronavirus can cause a respiratory illness, such as bronchitis or pneumonia.  The most common symptoms are: cough, fever, and shortness of breath.   Other symptoms are: body aches, chills, diarrhea, fatigue, headache, runny nose, and sore throat   Coronavirus - Exposure Risk Factors:   Exposure to a person who has been diagnosed with coronavirus.  Travel from an area with recent local transmission of coronavirus.  The CDC (www.cdc.gov) has the most up-to-date list of where the coronavirus outbreak is occurring.   Coronavirus - Spreading:    The virus likely spreads through respiratory droplets produced when a person coughs or sneezes. These respiratory droplets can travel approximately 6 feet and can remain on surfaces. Common disinfectants will kill the virus.  The CDC currently does not recommend healthy people wear masks.   Coronavirus - Protect Yourself:    Avoid close contact with people known to have this new coronavirus infection.  Wash hands often with soap and water or alcohol-based hand .  Avoid touching the eyes, nose or mouth.   Thank you for limiting contact with others, wearing a simple mask to cover your cough, practice good hand hygiene habits and accessing our Art-Exchange services where possible to limit the spread of this virus.  For more information about COVID19 and options for caring for yourself at  home, please visit the CDC website at https://www.cdc.gov/coronavirus/2019-ncov/about/steps-when-sick.html   For more options for care at [facility], please visit our website at [email or web address]     Diagnosis: Cough  Diagnosis ICD: R05  Additional Clinician Notes: If you are not feeling any better after 10 days total of symptoms, please submit a new [facility] visit for recheck.  I hope you feel better soon!  Prescription: benzonatate (Tessalon Perles) 100 mg oral capsule 30 capsule, 5 days supply. Take 1-2 capsules by mouth 3 times per day as needed. Refills: 0, Refill as needed: no, Allow substitutions: yes  Pharmacy: [id number] - [phone] - [address] [place] [address]

## 2020-03-26 ENCOUNTER — COMMUNICATION - HEALTHEAST (OUTPATIENT)
Dept: FAMILY MEDICINE | Facility: CLINIC | Age: 40
End: 2020-03-26

## 2020-03-26 DIAGNOSIS — N92.6 IRREGULAR MENSES: ICD-10-CM

## 2021-02-25 ENCOUNTER — COMMUNICATION - HEALTHEAST (OUTPATIENT)
Dept: FAMILY MEDICINE | Facility: CLINIC | Age: 41
End: 2021-02-25

## 2021-03-01 ENCOUNTER — OFFICE VISIT - HEALTHEAST (OUTPATIENT)
Dept: FAMILY MEDICINE | Facility: CLINIC | Age: 41
End: 2021-03-01

## 2021-03-01 ENCOUNTER — AMBULATORY - HEALTHEAST (OUTPATIENT)
Dept: FAMILY MEDICINE | Facility: CLINIC | Age: 41
End: 2021-03-01

## 2021-03-01 DIAGNOSIS — D64.9 ANEMIA, UNSPECIFIED TYPE: ICD-10-CM

## 2021-03-01 DIAGNOSIS — Z00.00 ROUTINE GENERAL MEDICAL EXAMINATION AT A HEALTH CARE FACILITY: ICD-10-CM

## 2021-03-01 DIAGNOSIS — N64.52 BILATERAL NIPPLE DISCHARGE: ICD-10-CM

## 2021-03-01 DIAGNOSIS — G43.809 OTHER MIGRAINE WITHOUT STATUS MIGRAINOSUS, NOT INTRACTABLE: ICD-10-CM

## 2021-03-01 LAB
ALBUMIN UR-MCNC: NEGATIVE MG/DL
APPEARANCE UR: CLEAR
BACTERIA #/AREA URNS HPF: ABNORMAL HPF
BILIRUB UR QL STRIP: NEGATIVE
CHOLEST SERPL-MCNC: 135 MG/DL
COLOR UR AUTO: YELLOW
FASTING STATUS PATIENT QL REPORTED: NO
GLUCOSE UR STRIP-MCNC: NEGATIVE MG/DL
HDLC SERPL-MCNC: 58 MG/DL
HGB BLD-MCNC: 11.6 G/DL (ref 12–16)
HGB UR QL STRIP: ABNORMAL
KETONES UR STRIP-MCNC: NEGATIVE MG/DL
LDLC SERPL CALC-MCNC: 67 MG/DL
LEUKOCYTE ESTERASE UR QL STRIP: NEGATIVE
NITRATE UR QL: NEGATIVE
PH UR STRIP: 6 [PH] (ref 5–8)
RBC #/AREA URNS AUTO: ABNORMAL HPF
SP GR UR STRIP: 1.01 (ref 1–1.03)
SQUAMOUS #/AREA URNS AUTO: ABNORMAL LPF
TRIGL SERPL-MCNC: 49 MG/DL
UROBILINOGEN UR STRIP-ACNC: ABNORMAL
WBC #/AREA URNS AUTO: ABNORMAL HPF

## 2021-03-01 RX ORDER — SUMATRIPTAN 50 MG/1
50 TABLET, FILM COATED ORAL
Qty: 30 TABLET | Refills: 0 | Status: SHIPPED | OUTPATIENT
Start: 2021-03-01 | End: 2021-08-25

## 2021-03-01 ASSESSMENT — MIFFLIN-ST. JEOR: SCORE: 1344.46

## 2021-03-02 ENCOUNTER — COMMUNICATION - HEALTHEAST (OUTPATIENT)
Dept: FAMILY MEDICINE | Facility: CLINIC | Age: 41
End: 2021-03-02

## 2021-03-09 LAB
BKR LAB AP ABNORMAL BLEEDING: NO
BKR LAB AP BIRTH CONTROL/HORMONES: NORMAL
BKR LAB AP CERVICAL APPEARANCE: NORMAL
BKR LAB AP GYN ADEQUACY: NORMAL
BKR LAB AP GYN INTERPRETATION: NORMAL
BKR LAB AP HPV REFLEX: NORMAL
BKR LAB AP LMP: NORMAL
BKR LAB AP PATIENT STATUS: NORMAL
BKR LAB AP PREVIOUS ABNORMAL: NORMAL
BKR LAB AP PREVIOUS NORMAL: 2019
HIGH RISK?: NO
PATH REPORT.COMMENTS IMP SPEC: NORMAL
RESULT FLAG (HE HISTORICAL CONVERSION): NORMAL

## 2021-03-16 ENCOUNTER — HOSPITAL ENCOUNTER (OUTPATIENT)
Dept: ULTRASOUND IMAGING | Facility: CLINIC | Age: 41
Discharge: HOME | End: 2021-03-16
Attending: FAMILY MEDICINE

## 2021-03-16 ENCOUNTER — HOSPITAL ENCOUNTER (OUTPATIENT)
Dept: MAMMOGRAPHY | Facility: CLINIC | Age: 41
Discharge: HOME | End: 2021-03-16
Attending: FAMILY MEDICINE

## 2021-03-16 DIAGNOSIS — N64.52 BILATERAL NIPPLE DISCHARGE: ICD-10-CM

## 2021-05-03 ENCOUNTER — COMMUNICATION - HEALTHEAST (OUTPATIENT)
Dept: FAMILY MEDICINE | Facility: CLINIC | Age: 41
End: 2021-05-03

## 2021-05-03 DIAGNOSIS — D64.9 ANEMIA, UNSPECIFIED TYPE: ICD-10-CM

## 2021-05-27 ENCOUNTER — RECORDS - HEALTHEAST (OUTPATIENT)
Dept: ADMINISTRATIVE | Facility: CLINIC | Age: 41
End: 2021-05-27

## 2021-05-27 NOTE — PROGRESS NOTES
Postpartum Visit  Date of Visit: 3/26/2019    Yeimy Montelongo is a 38 y.o. year old female here for a postpartum check. She is doing well and seems to be back to her normal self. She is 7 weeks postpartum following a spontaneous vaginal delivery. I have fully reviewed the prenatal and intrapartum course. The delivery was at 38 gestation. Outcome: spontaneous vaginal delivery. Anesthesia: epidural.  Postpartum course has been good. Baby's course has been good. Baby is feeding by bottle - Enfamil gentlease.  She bled for 3 weeks after delivery and is not currently bleeding.  Bowel function is normal. Bladder function is normal. She and her partner plan to use vasectomy as their definitive method for contraception. She has not gotten a period since birth of baby. They have not resumed intercourse since birth of baby. Postpartum depression screening: negative.    Patient feels like she is back to herself both physically and emotionally.  She overall is doing well.  Patient's  is planning to get a vasectomy but has not made an appointment yet.  We discussed the fact that he will need to go in for a consult and then will have the procedure done.  He then needs to have a number of ejaculations and it takes several months before he can be cleared in terms of the vasectomy to use that as a method of birth control.  We therefore need to think about something else that we can use for her for birth control for the next few months.  She has been on pills in the past.  She thinks that she would like to use pills again.  She cannot remember which pills she was on.  She did not have problems with any other medications that she was on.  They had a lot of difficulty getting pregnant in the first place and so she has not been on birth control pills or any type of contraception for quite some time.  I am going to go ahead and send in a prescription for Ortho Tri-Cyclen today as I think she will tolerate that well.  She needs to  wait until her next menstrual cycle comes to begin the pills.  If she does not have a menstrual cycle within a month from today she certainly should let me know.    Current Outpatient Medications:      norgestimate-ethinyl estradiol (ORTHO TRI-CYCLEN, 28,) 0.18/0.215/0.25 mg-35 mcg (28) Tab tablet, Take 1 tablet by mouth daily., Disp: 3 Package, Rfl: 4    No Known Allergies    Antepartum Complications: none  DANIEL: Estimated Date of Delivery: 2/16/19  Actual Date of Delivery: 2/3/19  Type of delivery: Spontaneous vaginal  Episiotomy/laceration: Yes, second degree laceration which was repaired.   Complications: none    Baby Rita is doing well. She is bottle feeding and this is going well. She eats 4 ounces every 3 hours. She seems to be thriving and is gaining weight well.     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 No pertinent surgical history.    Social History     Socioeconomic History     Marital status:      Spouse name: Aristeo Blackmon     Number of children: 2     Years of education: Not on file     Highest education level: Not on file   Occupational History     Occupation:      Comment: [employer]   Social Needs     Financial resource strain: Not on file     Food insecurity:     Worry: Not on file     Inability: Not on file     Transportation needs:     Medical: Not on file     Non-medical: Not on file   Tobacco Use     Smoking status: Never Smoker     Smokeless tobacco: Never Used   Substance and Sexual Activity     Alcohol use: No     Comment: rarely before pregnant     Drug use: No     Sexual activity: Yes     Partners: Male     Comment:  pregnancy   Lifestyle     Physical activity:     Days per week: Not on file     Minutes per session: Not on file     Stress: Not on file   Relationships     Social connections:     Talks on phone: Not on file     Gets together: Not on file     Attends [employer] service: Not on file     Active member of club or organization: Not on file     Attends meetings of clubs or organizations: Not on file     Relationship status: Not on file     Intimate partner violence:     Fear of current or ex partner: Not on file     Emotionally abused: Not on file     Physically abused: Not on file     Forced sexual activity: Not on file   Other Topics Concern     Not on file   Social History Narrative     Not on file       Immunization History   Administered Date(s) Administered     Influenza,seasonal quad, PF, 36+MOS 2016, 10/29/2018     Tdap 2007, 2016, 2018       Family History   Problem Relation Age of Onset     Rheum arthritis Mother      No Medical Problems Father      Cancer Maternal Uncle         bladder     Rheum arthritis Maternal Uncle      Heart disease Maternal Grandmother      Brain cancer Maternal Grandmother      Early death Maternal Grandfather         unknown cause     Rheum arthritis Maternal Uncle        OB History    Para Term  AB Living   2 2 2 0 0 2   SAB TAB Ectopic Multiple Live Births   0 0 0 0 2      # Outcome Date GA Lbr Eugene/2nd Weight Sex Delivery Anes PTL Lv   2 Term 19 38w1d 05:15 / 00:14 5 lb 11 oz (2.58 kg) F Vag-Spont EPI N JAI   1 Term 16 39w2d 10:10 / 00:48 7 lb 2 oz (3.232 kg) M Vag-Spont EPI N JAI      Birth Comments: neg GBS, spont labor, pit augmentation after epidural, progressed nicely, pushed 48 minutes, Right nuchal hand and arm and elbow at birth, large vaginal tear          ROS: Patient denies problems with nausea, vomiting, diarrhea, constipation, black tarry stools or blood in her stools. Denies chest pain, shortness of breath, problems  with urination or problems with intercourse. Remainder of review of systems is negative.     Patient's last menstrual period was 05/16/2018 (approximate).    OBJECTIVE:    /64   Pulse 93   Wt 144 lb (65.3 kg)   LMP 05/16/2018 (Approximate)   SpO2 98%   BMI 20.66 kg/m      PHYSICAL EXAM:  Well developed, well nourished, no acute distress.  HEENT: normocephalic/atraumatic, PERRLA/EOMI, TMs: Gray, normal light reflex, no nasal discharge.  Oral mucosa: no erythema/exudate  Neck: No LAD/masses/thyromegaly  Lungs: clear bilaterally  Heart: regular rate and rhythm, no murmurs/gallops/rubs  Breasts: symmetric, no masses/skin changes, nipple discharge, or axillary LAD.  BSE reviewed.  Abdomen: Normal bowel sounds, soft, non-tender, non-distended, no masses, neg Prado's/McBurney's, no rebound/guarding. Abdominal muscles well opposed.  Genital: Normal external genitalia, no discharge, no lesions, cervix is non-friable, os is closed, no CMT, no adnexal tenderness or fullness.  Uterus is firm, nontender, minimally enlarged, perineum intact.  Thin prep done.   Rectal: internal exam deferred.  External exam is normal.  Lymphatics: no supraclavicular/axillary/epitrochlear/inguinal LAD. No edema.  Neuro: A&O x 3, CN II-XII intact, strength 5/5, reflexes symmetric, sensory intact to light touch.  Psych: Behavior appropriate, engaging.  Thought processes congruent, non-tangential.  Musculoskeletal: no gross deformities.  Skin: no rashes or lesions.    Recent Results (from the past 240 hour(s))   Hemoglobin   Result Value Ref Range    Hemoglobin 12.6 12.0 - 16.0 g/dL   Urinalysis Macroscopic   Result Value Ref Range    Color, UA Yellow Colorless, Yellow, Straw, Light Yellow    Clarity, UA Clear Clear    Glucose, UA Negative Negative    Bilirubin, UA Negative Negative    Ketones, UA Trace (!) Negative    Specific Gravity, UA 1.025 1.005 - 1.030    Blood, UA Trace (!) Negative    pH, UA 5.5 5.0 - 8.0    Protein, UA 30 mg/dL  (!) Negative mg/dL    Urobilinogen, UA 0.2 E.U./dL 0.2 E.U./dL, 1.0 E.U./dL    Nitrite, UA Negative Negative    Leukocytes, UA Trace (!) Negative       ASSESSMENT/PLAN:      Healthy 38 y.o. old female here for 7 week postpartum exam, doing well.  Normal Post Post Partum Exam  No evidence of post partum depression  Contraception: Patient and her  have decided that they are done with her childbearing.  He is planning on getting a vasectomy however is not yet gone for a consult.  We discussed that he will need to make a consult appointment and then will later have the procedure.  He then needs to wait several months before he can go in with a specimen to make sure that it is free of sperm.  It will be several months before they can rely on this for birth control.  We did discuss multiple different methods of birth control and she would like to start on birth control pills.  I am going to go ahead and send her prescription for Ortho Tri-Cyclen to the pharmacy.  She needs to wait until her next menstrual cycle to start this.  As noted above she should call me if she does not her menstrual cycle within a month from now. She should start the pill the Sunday after she starts bleeding. If her period starts on Tuesday or Wednesday, she starts that next Sunday. If her period starts Saturday, she starts the next day. If her period starts Sunday, she starts that day.  She needs to finish an entire pack of ocp's  before she is protected against pregnancy. If she is started on antibiotics, she needs to use another form of birth control for that entire month. If she has any unusual bleeding, meaning any bleeding other than that which would be expected during the time she normally has her period, she needs to use another form of birth control for that entire month as well.  If she has any bleeding other than the last week of the pill pack, she is not protected against pregnancy. She should use another form of birth control  until she has had a month where the only bleeding she has is during the last week of the pill pack. She needs to take a pill every day and at the same time every day.  If she continues to have problems beyond 2 months after starting the medication, she should come in for evaluation.  This was all explained to patient today and she voiced understanding of all instructions.  Patient does note that she has been having some pain in her wrists bilaterally.  This especially is bad she tries to hold the baby her wrist goes into an odd position.  She does have some braces at home that she is going to use and see if they are helpful.  If they are not helpful she certainly will let me know and I would be happy to look into this further.  Preventative services reviewed today.  Return for physical exam in 1 year.    She will call with increasing problems or concerns. Will contact her with results of labs when available.   Tabatha Garrett MD

## 2021-05-30 VITALS — HEIGHT: 70 IN | WEIGHT: 131 LBS | BODY MASS INDEX: 18.75 KG/M2

## 2021-05-31 VITALS — WEIGHT: 130.9 LBS | HEIGHT: 70 IN | BODY MASS INDEX: 18.74 KG/M2

## 2021-06-01 VITALS — WEIGHT: 134.38 LBS | BODY MASS INDEX: 19.28 KG/M2

## 2021-06-01 VITALS — BODY MASS INDEX: 19.23 KG/M2 | WEIGHT: 134 LBS

## 2021-06-01 VITALS — BODY MASS INDEX: 18.81 KG/M2 | WEIGHT: 131.13 LBS

## 2021-06-01 VITALS — WEIGHT: 128.31 LBS | BODY MASS INDEX: 18.41 KG/M2

## 2021-06-02 VITALS — BODY MASS INDEX: 20.81 KG/M2 | WEIGHT: 145 LBS

## 2021-06-02 VITALS — BODY MASS INDEX: 21.38 KG/M2 | WEIGHT: 149 LBS

## 2021-06-02 VITALS — BODY MASS INDEX: 22.81 KG/M2 | WEIGHT: 159 LBS

## 2021-06-02 VITALS — WEIGHT: 164 LBS | BODY MASS INDEX: 23.53 KG/M2

## 2021-06-02 VITALS — BODY MASS INDEX: 21.95 KG/M2 | WEIGHT: 153 LBS

## 2021-06-02 VITALS — WEIGHT: 139.5 LBS | BODY MASS INDEX: 20.02 KG/M2

## 2021-06-02 VITALS — WEIGHT: 165 LBS | BODY MASS INDEX: 23.68 KG/M2

## 2021-06-02 VITALS — BODY MASS INDEX: 20.66 KG/M2 | WEIGHT: 144 LBS

## 2021-06-02 VITALS — BODY MASS INDEX: 21.71 KG/M2 | WEIGHT: 151.3 LBS

## 2021-06-02 VITALS — BODY MASS INDEX: 19.37 KG/M2 | WEIGHT: 135 LBS

## 2021-06-04 VITALS
HEART RATE: 94 BPM | WEIGHT: 139 LBS | SYSTOLIC BLOOD PRESSURE: 139 MMHG | DIASTOLIC BLOOD PRESSURE: 64 MMHG | OXYGEN SATURATION: 99 % | BODY MASS INDEX: 19.9 KG/M2 | HEIGHT: 70 IN

## 2021-06-05 VITALS
HEIGHT: 70 IN | HEART RATE: 92 BPM | OXYGEN SATURATION: 99 % | BODY MASS INDEX: 18.75 KG/M2 | SYSTOLIC BLOOD PRESSURE: 126 MMHG | WEIGHT: 131 LBS | DIASTOLIC BLOOD PRESSURE: 60 MMHG

## 2021-06-05 NOTE — PROGRESS NOTES
PROGRESS NOTE   1/7/2020    SUBJECTIVE:  Yeimy Montelongo is a 39 y.o. female  who presents for   Chief Complaint   Patient presents with     Menstrual Problem     Cycle has been off since October      Patient was originally scheduled for physical exam today however she had a postpartum check at the end of March last year and therefore is too early to have another Pap smear.  She notes that her primary reason for coming in for a physical was because her menstrual cycles have become very irregular.  She notes that she gave birth and pumped and fed pumped breast milk until the end of April or so.  She in the first part of May she stopped breast-feeding entirely and she got a menstrual cycle about a month later.  She then started on birth control pills and menstrual cycles seem to regulate right away.  They came as expected until October when it started a week early.  She was in with her kids at that time and asked me about it and I said to monitor it and if it persisted then she should let me know.  She notes that her November period was on time and at the time she expected but her December menstrual cycle started appropriately but it was lighter than normal and only lasted 4 days instead of the 5-6 that it normally does.  Then about 2 weeks later read on [place] time when she was on the second week of her pill pack she got what she thought was a menstrual cycle again.  She bled for about a week and it was typical menstrual cycle bleeding for her.  She has had any bleeding since then.  She notes that she should be on her menstrual cycle right now according to the birth control pack and she has not had any bleeding at all.  She is due to start a new pack of pills on Sunday of this week.  Her  did have a vasectomy last Friday and therefore she will not need pills for all that long but she is concerned about the irregularity that she is having with her menstrual cycle.  She is aware that she will need them for a  while in the upcoming months while her  waits to have had enough ejaculations that he can go in and be cleared after his vasectomy.  She has been taking her birth control pills faithfully and has not missed any pills.  She was on pills many years ago before they started trying to get pregnant.  It took them 5 years to get pregnant with her first child and so she has been on birth control pills for quite some time.  She feels absolustely fine other than the irregular bleeding that she has had last few months.  She has been on Ortho Tri-Cyclen as her method of birth control since she started on the pills after her last baby about 8 months ago or so.  She also notes that her urine has had a strong smell to it.  She has not any pain when she urinates and she has any fevers at all but she notes that there is a strong smell to her urine.  A few months ago she had bad urgency at the end of work on 1 of the days.  She then noticed that she had blood in her urine for about an hour or so and it was painful to go to the bathroom but then that episode lasted about 3 hours and she has not any problems since then.  She is feeling fine now.  She had a similar episode about 3 years ago and they did not have any explanation for it.  She is wondering if she might have a urinary tract infection or some other kind of infection that could have caused the strong odor in her urine.  She notes that today she has a migraine which is a typical migraine for her.  She is able to function and do what she needs to do.  It does not seem like the migraine headaches are coming more frequently or that she needs any further evaluation for these.  She has a history of migraine headaches and this is very typical for her.  She also notes that she continues to have wrist pain.  She has been working with the orthopedist for that and would like to have some labs drawn that they have requested.  They are wanting to rule out any kind of autoimmune  "issue which could be contributing to her wrist pain.      Patient Active Problem List   Diagnosis     Migraine Headache       Current Outpatient Medications   Medication Sig Dispense Refill     desogestrel-ethinyl estradiol (APRI) 0.15-0.03 mg per tablet Take 1 tablet by mouth daily. 3 Package 0     sulfamethoxazole-trimethoprim (BACTRIM DS) 800-160 mg per tablet Take 1 tablet by mouth 2 (two) times a day for 10 days. 20 tablet 0     No current facility-administered medications for this visit.        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 No pertinent surgical history.    Social History     Tobacco Use   Smoking Status Never Smoker   Smokeless Tobacco Never Used       OBJECTIVE:     /64   Pulse 94   Ht 5' 10\" (1.778 m)   Wt 139 lb (63 kg)   LMP 12/26/2019 (Exact Date)   SpO2 99%   Breastfeeding No   BMI 19.94 kg/m      Physical Exam:  GENERAL APPEARANCE: A&A, NAD, well hydrated, well nourished  SKIN:  Normal skin turgor, no lesions/rashes   HEENT: moist mucous membranes, no rhinorrhea, PERRLA, TM's clear bilaterally, Throat without significant erythema or exudate.  NECK: Supple, full ROM, no significant lymphadenopathy or thyromegaly  CV: RRR, no M/G/R   LUNGS: CTAB  ABDOMEN: S&NT, no masses or organomegaly, positive bowel sounds.  No CVA tenderness was appreciated.  No tenderness over the bladder was appreciated.  EXTREMITY: no swelling noted.  Full range of motion of all 4 extremities.   NEURO: no gross deficits   PSYCHIATRIC;  Mood appropriate, memory intact    LABS:     Recent Results (from the past 240 hour(s))   Comprehensive Metabolic Panel   Result Value Ref Range    Sodium 141 136 - 145 mmol/L    Potassium 4.2 3.5 " - 5.0 mmol/L    Chloride 106 98 - 107 mmol/L    CO2 23 22 - 31 mmol/L    Anion Gap, Calculation 12 5 - 18 mmol/L    Glucose 92 70 - 125 mg/dL    BUN 13 8 - 22 mg/dL    Creatinine 0.86 0.60 - 1.10 mg/dL    GFR MDRD Af Amer >60 >60 mL/min/1.73m2    GFR MDRD Non Af Amer >60 >60 mL/min/1.73m2    Bilirubin, Total 0.3 0.0 - 1.0 mg/dL    Calcium 9.4 8.5 - 10.5 mg/dL    Protein, Total 7.2 6.0 - 8.0 g/dL    Albumin 4.2 3.5 - 5.0 g/dL    Alkaline Phosphatase 56 45 - 120 U/L    AST 13 0 - 40 U/L    ALT 17 0 - 45 U/L   Dehydroepiandrosterone Sulfate, Serum (DHEAS)   Result Value Ref Range    DHEAS 78 45 - 270 ug/dL   Follicle Stimulating Hormone (FSH)   Result Value Ref Range    FSH 19.2 mIU/mL   Luteinizing Hormone (LH)   Result Value Ref Range    LH 8.2 mIU/mL   Prolactin   Result Value Ref Range    Prolactin 11.3 0.0 - 20.0 ng/mL   Testosterone, Total   Result Value Ref Range    Testosterone 13 (L) 14 - 53 ng/dL   HM1 (CBC with Diff)   Result Value Ref Range    WBC 5.2 4.0 - 11.0 thou/uL    RBC 4.43 3.80 - 5.40 mill/uL    Hemoglobin 13.5 12.0 - 16.0 g/dL    Hematocrit 40.8 35.0 - 47.0 %    MCV 92 80 - 100 fL    MCH 30.6 27.0 - 34.0 pg    MCHC 33.2 32.0 - 36.0 g/dL    RDW 11.5 11.0 - 14.5 %    Platelets 308 140 - 440 thou/uL    MPV 8.6 7.0 - 10.0 fL    Neutrophils % 66 50 - 70 %    Lymphocytes % 27 20 - 40 %    Monocytes % 6 2 - 10 %    Eosinophils % 2 0 - 6 %    Basophils % 0 0 - 2 %    Neutrophils Absolute 3.4 2.0 - 7.7 thou/uL    Lymphocytes Absolute 1.4 0.8 - 4.4 thou/uL    Monocytes Absolute 0.3 0.0 - 0.9 thou/uL    Eosinophils Absolute 0.1 0.0 - 0.4 thou/uL    Basophils Absolute 0.0 0.0 - 0.2 thou/uL   Thyroid Stimulating Hormone (TSH)   Result Value Ref Range    TSH 1.55 0.30 - 5.00 uIU/mL   Sedimentation Rate   Result Value Ref Range    Sed Rate 2 0 - 20 mm/hr   T3, Total   Result Value Ref Range    T3, Total 109 45 - 175 ng/dL   T4, Total   Result Value Ref Range    T4, Total 11.7 4.5 - 13.0 ug/dL   T4, Free   Result  Value Ref Range    Free T4 1.1 0.7 - 1.8 ng/dL   Antinuclear Antibody (TESHA) Cascade   Result Value Ref Range    TESHA Screen [place] 0.8 <=2.9 U   Lyme Antibody Cascade   Result Value Ref Range    Lyme Antibody Cascade 0.21 <0.90 Index Value   C-Reactive Protein (CRP)   Result Value Ref Range    CRP 0.2 0.0 - 0.8 mg/dL   Uric Acid   Result Value Ref Range    Uric Acid 4.1 2.0 - 7.5 mg/dL   Rheumatoid Factor Screen   Result Value Ref Range    Rheumatoid Factor Quantitative <15.0 0 - 30 IU/mL   Pregnancy (Beta-hCG, Qual), Urine   Result Value Ref Range    Pregnancy Test, Urine Negative Negative   Urinalysis Macroscopic   Result Value Ref Range    Color, UA Yellow Colorless, Yellow, Straw, Light Yellow    Clarity, UA Clear Clear    Glucose, UA Negative Negative    Bilirubin, UA Negative Negative    Ketones, UA Negative Negative    Specific Gravity, UA 1.010 1.005 - 1.030    Blood, UA Trace (!) Negative    pH, UA 6.5 5.0 - 8.0    Protein, UA Negative Negative mg/dL    Urobilinogen, UA 0.2 E.U./dL 0.2 E.U./dL, 1.0 E.U./dL    Nitrite, UA Negative Negative    Leukocytes, UA Small (!) Negative   Culture, Urine   Result Value Ref Range    Culture >100,000 col/ml Escherichia coli (!)        Susceptibility    Escherichia coli - ANDREW     Amoxicillin + Clavulanate <=4/2 Sensitive      Ampicillin <=4 Sensitive      Cefazolin <=1 Sensitive      Cefepime <=1 Sensitive      Ceftriaxone <=1 Sensitive      Ciprofloxacin <=0.5 Sensitive      Gentamicin <=2 Sensitive      Levofloxacin <=1 Sensitive      Meropenem <=0.25 Sensitive      Nitrofurantoin <=16 Sensitive      Tetracycline <=2 Sensitive      Tobramycin <=2 Sensitive      Trimethoprim + Sulfamethoxazole <=0.5 Sensitive    Urine,Microscopic   Result Value Ref Range    Bacteria, UA Many (!) None Seen hpf    RBC, UA 0-2 None Seen, 0-2 hpf    WBC, UA 5-10 (!) None Seen, 0-5 hpf    Squam Epithel, UA 25-50 (!) None Seen, 0-5 lpf       ASSESSMENT/PLAN:     Irregular menses [N92.6]      1.  Irregular menses  - Comprehensive Metabolic Panel  - Dehydroepiandrosterone Sulfate, Serum (DHEAS)  - Follicle Stimulating Hormone (FSH)  - Luteinizing Hormone (LH)  - Pregnancy (Beta-hCG, Qual), Urine  - Prolactin  - Testosterone, Total  - desogestrel-ethinyl estradiol (APRI) 0.15-0.03 mg per tablet; Take 1 tablet by mouth daily.  Dispense: 3 Package; Refill: 0  - HM1(CBC and Differential)  - HM1 (CBC with Diff)    2. Dysuria  - Urinalysis Macroscopic  - Culture, Urine  - Urine,Microscopic  - HM1(CBC and Differential)  - HM1 (CBC with Diff)    3. Wrist pain  - Thyroid Stimulating Hormone (TSH)  - Sedimentation Rate  - T3, Total  - T4, Total  - T4, Free  - Antinuclear Antibody (TESHA) Cascade  - Lyme Antibody Cascade  - C-Reactive Protein (CRP)  - Uric Acid  - Rheumatoid Factor Screen    Patient overall seems to be doing well.  She is feeling well other than the fact that she has been having irregular menstrual cycles.  She went on birth control pills of Ortho Tri-Cyclen after she stopped breast-feeding.  That was in March 2019.  For the first few months her menstrual cycle seem to be regular but now they have become more irregular.  She had an irregular menstrual cycle in October and then had a normal 1 in November and then her December 1 she had what she thought was a normal menstrual cycle but then a week and a half later she had another menstrual cycle.  She and her  are done conceiving children.  Her  actually just had a vasectomy about a week ago.  She knows that she needs to be on something for birth control until after he is cleared from his vasectomy which can take several months.  At this point I think would be best for her to stop her birth control pills entirely and let herself get a normal menstrual cycle and then when she gets her normal menstrual cycle then she should start on a different type of birth control pill.  Take your prescription for Apri today.  We will try that birth control  pill and see if that is more effective for her.  I have also given just some hormone levels today and make sure that there is no hormone abnormalities which are contributing to the irregularity of her menstrual cycles.  Metabolic panel as well as DHEAS FSH LH pregnancy test prolactin and testosterone as well as a CBC were drawn today.  We will contact her with results of those when they return.  In terms of the strong urine odor I did go ahead and do a urinalysis as well as a urine culture.  We will contact her with results of those when they return.  In terms of her wrist pain the orthopedist wanted thyroid levels as well as a sed rate, TESHA, Lyme titer, CRP uric acid and rheumatoid factor.  Those were all done today as well.  We will contact her with results of all of these labs when they return.  We will make sure that the above labs for the wrist pain are faxed to the orthopedist when they return.  At this point she is get a monitor her menstrual cycles for the next couple of months with the different birth control pills and will let me know how things go.  She is due for a physical exam sometime around April or so and will return at that time.  Certainly if she continues to have irregularities to her menstrual cycles she should let me know and we will need to do further evaluation of course.  All of her questions were answered today.  If she has additional questions or concerns will let me know.  We will see her back in a few months for follow-up and physical exam.  She is due to come in with her kids in the meantime and certainly if she continues to have the difficulties with the menstrual cycles will let me know. Total time spent with patient was at least 25 minutes,  of which greater than fifty percent was spent in counselling and coordination of care of the above medical problems.  Tabatha Garrett MD

## 2021-06-08 NOTE — PROGRESS NOTES
Postpartum Visit  Date of Visit: 1/4/2017    Yeimy Montelongo is a 36 y.o. year old female here for a postpartum check. She is doing well and seems to be back to her normal self. She is 8 weeks postpartum following a spontaneous vaginal delivery. I have fully reviewed the prenatal and intrapartum course. The delivery was at 39 gestational weeks. Outcome: spontaneous vaginal delivery. Anesthesia: epidural.  Postpartum course has been good. Baby's course has been good. Baby is feeding by bottle - Enfamil Lipil.  She bled for 3 weeks after delivery and is not currently bleeding.  Bowel function is normal. Bladder function is normal. She and her partner plan to use no method for contraception.  They have a long-standing history of infertility and it took over 5 years for them to get pregnant with this child.  We did discuss the fact that having had a baby sometimes can cause changes and reproduction and she may be more fertile now that she has had a child and that may be easier for them to get pregnant.  They voiced understanding of that and still do not desire to use anything for birth control.  If they were to get pregnant right away that is okay.  She has not gotten a period since birth of baby.  She continues to pump 3 times a day but is not getting very much.  We discussed that until she stops breast-feeding entirely including pumping she may not get a menstrual cycle but certainly at any time now she could expect that she might get a menstrual cycle.  They have not resumed intercourse since birth of baby. Postpartum depression screening: negative.      Current Outpatient Prescriptions:      docusate sodium (COLACE) 50 MG capsule, Take by mouth 2 (two) times a day., Disp: , Rfl:      PNV NO.74/IRON FUM/FA/COQ10 (THERANATAL OVAVITE ORAL), Take 1 tablet by mouth daily., Disp: , Rfl:     No Known Allergies    Antepartum Complications: none  DANIEL: Estimated Date of Delivery: 11/6/16  Actual Date of Delivery:  16  Type of delivery: Spontaneous vaginal  Episiotomy/laceration: No  Complications: right nuchal hand and arm, large vaginal tear    Baby Topher  is doing well. He is bottle feeding and this is going well. He eats 4 ounces every 3-4 hours. He seems to be thriving and is gaining weight well.     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 No pertinent past surgical history.    Social History     Social History     Marital status:      Spouse name: Aristeo Blackmon     Number of children: 1     Years of education: N/A     Occupational History     process disability forms      Social History Main Topics     Smoking status: Never Smoker     Smokeless tobacco: Never Used     Alcohol use No      Comment: rarely before pregnant     Drug use: No     Sexual activity: Yes     Partners: Male      Comment: open to pregnancy     Other Topics Concern     Not on file     Social History Narrative       Immunization History   Administered Date(s) Administered     Influenza,seasonal quad, PF, 36+MOS 2016     Td, historic 2007     Tdap 2016       Family History   Problem Relation Age of Onset     Arthritis Mother      rheumatoid     No Medical Problems Father      Arthritis Maternal Uncle      rheumatoid     Cancer Maternal Uncle      bladder     Cancer Maternal Grandmother      brain     Heart disease Maternal Grandmother      Early death Maternal Grandfather      unknown cause     Arthritis Maternal Uncle      rheumatoid       OB History    Para Term  AB SAB TAB Ectopic Multiple Living   1 1 1 0 0 0 0 0 0 1      # Outcome Date GA Lbr Eugene/2nd Weight Sex Delivery Anes PTL Lv   1 Term 16 39w2d 10:10 / 00:48 7 lb 2 oz (3.232 kg) M  "Vag-Spont EPI N Y      Birth Comments: neg GBS, spont labor, pit augmentation after epidural, progressed nicely, pushed 48 minutes, Right nuchal hand and arm and elbow at birth, large vaginal tear          ROS: Patient denies problems with nausea, vomiting, diarrhea, constipation, black tarry stools or blood in her stools. Denies chest pain, shortness of breath, problems with urination or problems with intercourse. Remainder of review of systems is negative.     Patient's last menstrual period was 02/09/2016.    OBJECTIVE:    Visit Vitals     /64 (Patient Site: Left Arm, Patient Position: Sitting, Cuff Size: Adult Regular)     Pulse 74  Comment: regular     Temp 98.2  F (36.8  C) (Oral)     Resp 14  Comment: regular     Ht 5' 10\" (1.778 m)     Wt 131 lb (59.4 kg)     LMP 02/09/2016     Breastfeeding No     BMI 18.8 kg/m2       PHYSICAL EXAM:  Well developed, well nourished, no acute distress.  HEENT: normocephalic/atraumatic, PERRLA/EOMI, TMs: Gray, normal light reflex, no nasal discharge.  Oral mucosa: no erythema/exudate  Neck: No LAD/masses/thyromegaly/bruits  Lungs: clear bilaterally  Heart: regular rate and rhythm, no murmurs/gallops/rubs  Breasts: symmetric, no masses/skin changes, nipple discharge, or axillary LAD.  BSE reviewed.  Abdomen: Normal bowel sounds, soft, non-tender, non-distended, no masses, neg Prado's/McBurney's, no rebound/guarding. Abdominal muscles well opposed.  Genital: Normal external genitalia, no discharge, no lesions, cervix is non-friable, os is closed, no CMT, no adnexal tenderness or fullness.  Uterus is firm, nontender, minimally enlarged, perineum intact.  Thin prep done.    Rectal: internal exam deferred.  External exam is normal.  Lymphatics: no supraclavicular/axillary/epitrochlear/inguinal LAD. No edema.  Neuro: A&O x 3, CN II-XII intact, strength 5/5, reflexes symmetric, sensory intact to light touch.  Psych: Behavior appropriate, engaging.  Thought processes congruent, " non-tangential.  Musculoskeletal: no gross deformities.  Skin: no rashes or lesions.    Recent Results (from the past 240 hour(s))   Hemoglobin   Result Value Ref Range    Hemoglobin 13.4 12.0 - 16.0 g/dL   Urinalysis Macroscopic   Result Value Ref Range    Color, UA Yellow Colorless, Yellow, Straw, Light Yellow    Clarity, UA Slightly Cloudy (!) Clear    Glucose, UA Negative Negative    Bilirubin, UA Negative Negative    Ketones, UA Negative Negative    Specific Gravity, UA 1.020 1.002 - 1.030    Blood, UA Large (!) Negative    pH, UA 6.5 4.5 - 8.0    Protein, UA Negative Negative mg/dL    Urobilinogen, UA 0.2 E.U./dL 0.2 E.U./dL, 1.0 E.U./dL    Nitrite, UA Negative Negative    Leukocytes, UA Trace (!) Negative   Urine,Microscopic   Result Value Ref Range    Bacteria, UA Moderate (!) None Seen hpf    RBC, UA 10-25 (!) None Seen, 0-2 hpf    WBC, UA 0-5 None Seen, 0-5 hpf    Squam Epithel, UA 25-50 (!) None Seen, 0-5 lpf    Mucus, UA Few (!) None Seen lpf       ASSESSMENT/PLAN:      Healthy 36 y.o. old female here for 8 week postpartum exam, doing well.  Normal Post Post Partum Exam  No evidence of post partum depression  Contraception:  Open to pregnancy and do not desire to start anything for birth control at this time.  They have long-standing history of infertility and she has a low egg count.  If they were to get pregnant they would be ecstatic.  Preventative services reviewed today.  Return for physical exam in 1 year.    She will call with increasing problems or concerns. Will contact her with results of labs when available.     Tabatha Garrett MD

## 2021-06-10 NOTE — PROGRESS NOTES
ASSESSMENT:  Lymphadenopathy [R59.1]    1. Lymphadenopathy  - azithromycin (ZITHROMAX Z-KAREN) 250 MG tablet; Take 2 tablets (500 mg) on  Day 1,  followed by 1 tablet (250 mg) once daily on Days 2 through 5.  Dispense: 6 tablet; Refill: 0    2. Serous otitis media  - azithromycin (ZITHROMAX Z-KAREN) 250 MG tablet; Take 2 tablets (500 mg) on  Day 1,  followed by 1 tablet (250 mg) once daily on Days 2 through 5.  Dispense: 6 tablet; Refill: 0    Patient overall seems to be doing okay.  She has now had chronic nasal discharge for the last 2 months.  She is also noted to have bilateral lymphadenopathy today as well as fluid in both of her ears.  I suspect that she has a low-grade infection which is causing the lymphadenopathy as well as the serous otitis media.  I am going to go ahead and treat her with some Zithromax today.  That prescription was sent to the pharmacy.  I would suspect that she would have gradual improvement in her symptoms.  If she does not have gradual improvement in her symptoms she needs to let me know.  We did discuss the fact that if this is no better she probably needs to be evaluated by the ear nose and throat doctors.  She voiced understanding of that.  She will let me know if she does not have complete resolution of her symptoms.  If she has any changes in her symptoms she will let me know as well.  We otherwise will see her on an as-needed basis.    PLAN:  There are no Patient Instructions on file for this visit.    Azithromycin 250 mg for 5 days, 2 tablets today, 1 tablet daily for 4 more days.     No orders of the defined types were placed in this encounter.    Medications Discontinued During This Encounter   Medication Reason     PNV NO.74/IRON FUM/FA/COQ10 (THERANATAL OVAVITE ORAL)      docusate sodium (COLACE) 50 MG capsule        Return in about 8 months (around 1/22/2018), or if symptoms worsen or fail to improve, for Annual physical.    CHIEF COMPLAINT:  Chief Complaint   Patient presents  "with     Nasal Congestion     Check nasal congestion with pain right side of face, getting worse x 2 months       HISTORY OF PRESENT ILLNESS:  Yeimy is a 37 y.o. female presenting to the clinic today for rhinorrhea. These symptoms have been worsening over the last 2 months. She has constant rhinorrhea, with pain and irritation inside her left nostril, which feels like sores in her nostril. The discharge from her nose is usually clear and sometimes yellow. Her symptoms are unchanging in severity. She has had sinus infections in the past, and says she does not have any of the symptoms she usually has with a sinus infections, and is not coughing, and is afebrile. She tried treating with nasal saline rinses, which were not helpful.  She denies that she has had any sore throat.  She has had these symptoms now for the last couple of months.  It does not seem like they are getting better but they also do not seem like they are getting any worse.      PFSH:  She has a 6 m.o. son.     Patient Active Problem List   Diagnosis     Migraine Headache     Female Infertility     Acrochordon       No Known Allergies    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     low egg count, took 5 years to get pregnant     Migraine        History reviewed. No pertinent surgical history.    History   Smoking Status     Never Smoker   Smokeless Tobacco     Never Used       TOBACCO USE:  History   Smoking Status     Never Smoker   Smokeless Tobacco     Never Used       VITALS:  Vitals:    05/22/17 1029   BP: (!) 84/54   Patient Site: Right Arm   Patient Position: Sitting   Cuff Size: Adult Regular   Pulse: 70   Resp: 14   Temp: 98.7  F (37.1  C)   TempSrc: Oral   Weight: 130 lb 14.4 oz (59.4 kg)   Height: 5' 10\" (1.778 m)     Wt Readings from Last 3 Encounters:   05/22/17 130 lb 14.4 oz (59.4 kg)   01/04/17 131 lb (59.4 kg)   11/01/16 153 lb (69.4 kg)     Body mass index is 18.78 kg/(m^2).    PHYSICAL " EXAM:  GENERAL APPEARANCE: A&A, NAD, well hydrated, well nourished  SKIN:  Normal skin turgor, no lesions/rashes   HEENT: moist mucous membranes, no rhinorrhea, PERRLA, fluid behind TM's bilaterally but no evidence for erythema or infection, Throat without significant erythema or exudate.  She did not have significant tenderness over her maxillary region of her sinuses or the frontal sinuses.  No abnormality was noted when we specifically looked at her nose and nostrils.  NECK: Supple, full ROM, no thyromegaly, she did have significant bilateral submandibular lymphadenopathy noted.  CV: RRR, no M/G/R   LUNGS: CTAB  EXTREMITY: no edema   NEURO: no gross deficits   PSYCHIATRIC;  Mood appropriate, memory intact      ADDITIONAL HISTORY SUMMARIZED (2): None.  DECISION TO OBTAIN EXTRA INFORMATION (1): None.   RADIOLOGY TESTS (1): None.  LABS (1): None.  MEDICINE TESTS (1): None.  INDEPENDENT REVIEW (2 each): None.     The visit lasted a total of 8 minutes face to face with the patient. Over 50% of the time was spent counseling and educating the patient about rhinorrhea and nasal pain.    IRossy, am scribing for and in the presence of, Dr. Garrett.    I, Dr. Garrett, personally performed the services described in this documentation, as scribed by Rossy Grigsby in my presence, and it is both accurate and complete.    MEDICATIONS:  No current outpatient prescriptions on file.     No current facility-administered medications for this visit.        Total data points: 0

## 2021-06-15 NOTE — PROGRESS NOTES
Assessment:      Healthy female exam.      Plan:       All questions answered.  Await pap smear results.      ASSESSMENT: 40 y.o. female physical exam and pap smear.    PLAN:     Routine general medical examination at a health care facility [Z00.00]    1. Routine general medical examination at a health care facility  - Hemoglobin  - Urinalysis Macroscopic  - Lipid Cascade RANDOM  - Gynecologic Cytology (PAP Smear)  - Urine,Microscopic  - HPV High Risk DNA Cervical    2. Other migraine without status migrainosus, not intractable  - SUMAtriptan (IMITREX) 50 MG tablet; Take 1 tablet (50 mg total) by mouth once as needed for migraine.  Dispense: 30 tablet; Refill: 0    3. Bilateral nipple discharge  - Mammo Diagnostic Bilateral; Future      Patient is a 40 y.o. female who is overall doing well.  Pap smear was completed today.  Contact patient with results of the lab work when it returns.  She does have a history of migraine headaches which have gotten worse around the time of her menstrual cycle.  She gets one fairly bad headache about a week before she gets her menstrual cycle and then for the first 4 days of her menstrual cycle it seems like she has several headaches that come to come and go.  The only thing that she is really been able to do to get rid of them is to go to bed and that is not real practical when he have 2 small children at home.  She has used Imitrex in the past and has been very helpful for her.  We will try some more Imitrex and see how she does.  She is tried ibuprofen and Excedrin neither which have been real helpful for her.  I did give her a prescription for Imitrex and if that seems to work she certainly should let me know.  She does not have migraine headaches any other time except around her menstrual cycle.  In terms of the nipple discharge I certainly do not feel any active discharge today.  I do not see any evidence for any abnormalities on her breast exam today I feel no lumps.  She has  some crusted thickened skin around the top of her nipple area on both nipples but the left one seems to be worse than the right 1.  I think it is important that she go ahead and get a mammogram to make sure that there is no other abnormalities appreciated.  She suddenly started having breast discharge when she had not had it before and so I think it deserves work-up.  I did order of diagnostic mammotoday and someone should contact her regarding getting that scheduled.  If she does not hear from someone she certainly should let me know.  Urinalysis was done today but patient is on the end of her menstrual cycle and therefore did show blood.  We will need to repeat that at a future date.  In terms of the wart on her finger she would like to do more over-the-counter medication.  We talked about using freeze it on a more consistent basis and for a longer period of time and hopefully that will help.  If it does not she certainly can let me know and I had be happy to freeze it at any time.  All of her questions and concerns were addressed today.  If she has additional problems or concerns should let me know.    Will contact her with the results of the labs when available.  Additional 15 minutes spent with patient discussing new nipple discharge and crusty nipples as well as migraine headaches..    Voice recognition software was used in the creation of this note. Any grammatical or nonsensical errors are due to inherent errors with the software and are not the intention of the writer.      Tabatha Garrett M.D.       Subjective:      Yeimy Montelongo is a 40 y.o. female who presents for an annual exam. The patient is sexually active. The patient participates in regular exercise: yes. The patient reports that there is not domestic violence in her life.  She overall feels like things are doing well.  She does note that she has been getting migraine headaches.  Things have been a problem in the past but she had not really  had them recently.  She now notes that she has migraine headaches pretty much around the time of her menstrual cycle.  She gets a bad one about a week before she has her menstrual cycle and then for the first 3 to 4 days of her period she gets migraines on and off.  She needs to go to bed frequently to get rid of these and thus not practical when you have 2 small children.  She uses ibuprofen as well as Excedrin Migraine usually helps a little bit but really what helps the most is sleeping.  She does have some old Imitrex at home that she is used in the past and that seemed to work well for her.  She is wondering if she can get a prescription for Imitrex.  She has no other symptoms that go along with it.  Other than the time of her menstrual cycle she seems to be doing well and does not have migraine headaches at other times of the month.  She also notes that she has some dried discharge on her nipples bilaterally.  It seems like milk at first but she was concerned about it.  She googled it and so now she is very concerned about possibility of breast cancer.  It seemed like it was there and crusted onto her nipple area fairly thick for a while and now it actually seems like it is a little bit better.  She has not noticed any lumps in her breast at all.  There is no pain in the breast.  We did discuss hepatitis C and she declines that testing today.  She also declines flu vaccination as well as hepatitis A and hepatitis B vaccination.  She does have a wart on the third finger of her right hand on the dorsum section of the hand.  It is just over the PIP joint.  She is tried some over-the-counter treatments for it which may have helped a little bit but the wart always comes back and so she is wondering what else she can do about it.    Healthy Habits:   Regular Exercise: Yes  Sunscreen Use: Yes  Healthy Diet: Yes  Dental Visits Regularly: Yes  Seat Belt: Yes  Sexually active: Yes  Self Breast Exam  Monthly:Yes  Hemoccults: N/A  Flex Sig: N/A  Colonoscopy: N/A  Lipid Profile: No  Glucose Screen:   Prevention of Osteoporosis: Yes  Last Dexa: No  Guns at Home:  Yes  Guns Safety Locks:  Yes      Immunization History   Administered Date(s) Administered     INFLUENZA,SEASONAL QUAD, PF, =/> 6months 2016, 10/29/2018     Tdap 2007, 2016, 2018     Immunization status: up to date and documented, patient declined and Valenza vaccination as well as hepatitis A and hepatitis B vaccination today..    No exam data present    Gynecologic History  LMP: 2021  Contraception: Vasectomy   Last Pap: . Results were: normal  Last mammogram: NA. Results were: NA      OB History    Para Term  AB Living   2 2 2 0 0 2   SAB TAB Ectopic Multiple Live Births   0 0 0 0 2      # Outcome Date GA Lbr Eugene/2nd Weight Sex Delivery Anes PTL Lv   2 Term 19 38w1d 05:15 / 00:14 5 lb 11 oz (2.58 kg) F Vag-Spont EPI N JAI   1 Term 16 39w2d 10:10 / 00:48 7 lb 2 oz (3.232 kg) M Vag-Spont EPI N JAI      Birth Comments: neg GBS, spont labor, pit augmentation after epidural, progressed nicely, pushed 48 minutes, Right nuchal hand and arm and elbow at birth, large vaginal tear       Current Outpatient Medications   Medication Sig Dispense Refill     SUMAtriptan (IMITREX) 50 MG tablet Take 1 tablet (50 mg total) by mouth once as needed for migraine. 30 tablet 0     No current facility-administered medications for this visit.      Past Medical History:   Diagnosis Date     De Quervain's syndrome (tenosynovitis) 2019    bilateral     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     low egg count, took 5 years to get pregnant with her first     Migraine      Normal delivery 2016     Normal delivery 2019     Past Surgical History:   Procedure Laterality Date     none       Patient has no known allergies.  Family History   Problem Relation Age of Onset     Rheum arthritis Mother      No Medical  "Problems Father      Cancer Maternal Uncle         bladder     Rheum arthritis Maternal Uncle      Heart disease Maternal Grandmother      Brain cancer Maternal Grandmother      Early death Maternal Grandfather         unknown cause     Rheum arthritis Maternal Uncle      Social History     Socioeconomic History     Marital status:      Spouse name: Aristeo Blackmon     Number of children: 2     Years of education: Not on file     Highest education level: Not on file   Occupational History     Occupation:      Comment: [employer]   Social Needs     Financial resource strain: Not on file     Food insecurity     Worry: Not on file     Inability: Not on file     Transportation needs     Medical: Not on file     Non-medical: Not on file   Tobacco Use     Smoking status: Never Smoker     Smokeless tobacco: Never Used   Substance and Sexual Activity     Alcohol use: No     Comment: 1-2 times a year     Drug use: No     Sexual activity: Yes     Partners: Male     Birth control/protection: Surgical     Comment: vasectomy   Lifestyle     Physical activity     Days per week: Not on file     Minutes per session: Not on file     Stress: Not on file   Relationships     Social connections     Talks on phone: Not on file     Gets together: Not on file     Attends Denominational service: Not on file     Active member of club or organization: Not on file     Attends meetings of clubs or organizations: Not on file     Relationship status: Not on file     Intimate partner violence     Fear of current or ex partner: Not on file     Emotionally abused: Not on file     Physically abused: Not on file     Forced sexual activity: Not on file   Other Topics Concern     Not on file   Social History Narrative     Not on file               Objective:         Vitals:    03/01/21 1426   BP: 126/60   Pulse: 92   SpO2: 99%   Weight: 131 lb (59.4 kg)   Height: 5' 10\" (1.778 m)     Body mass index is 18.8 kg/m .         REVIEW OF " "SYSTEMS:   Denies fever, chills, visual changes, fatigue, myalgias, nasal congestion, rhinorrhea, ear pain or discharge, sore throat, swollen glands, breast mass, nipple discharge, breast changes, abdominal pain, nausea, vomiting, diarrhea, constipation, cough, shortness of breath, chest pain, weight change, change in bowel habits, melena, rectal bleeding, dysuria, frequency, urgency, hematuria, polyuria, polydipsia, polyphagia, joint pain or swelling or erythema, edema, rash, weakness, paresthesias, vaginal discharge or bleeding or mood changes other than that mentioned above in HPI.   Remainder of review of systems was negative.      PHYSICAL EXAM:  On exam, patient is a WD, WN 40 y.o. female in NAD.  /60   Pulse 92   Ht 5' 10\" (1.778 m)   Wt 131 lb (59.4 kg)   LMP 02/24/2021 (Approximate)   SpO2 99%   Breastfeeding No   BMI 18.80 kg/m    Head normocephalic, atraumatic.  Eyes PERRL, ears TM s clear bilaterally.  Throat without significant erythemia or exudate.  Neck was supple, full range of motion. No significant lymphadenopathy or thyromegaly was appreciated.  Lungs clear to auscultation  Heart regular rate and rhythm.  Breast exam was done. No masses were appreciated. Axilla were clear bilaterally. No nipple discharge was appreciated.  On the nipples bilaterally there was some crusted skin noted with the left side being worse than the right side.  There is no evidence for secondary infection at all.  Abdomen was soft, nontender, nondistended. No masses or organomegaly were palpated. Positive bowel sounds were appreciated.  Extremities with full range of motion of all 4 extremities were noted.  Deep tendon reflexes were equal and symmetrical. Motor and sensation were intact to both the upper and lower extremities.  Cranial nerves 2 through 12 were grossly intact.  EOM were intact.  Pelvic exam was done.  External genitalia appeared normal. Speculum was introduced and the Pap smear obtained. Bimanual " exam revealed uterus to be normal size and adenexa without palpable masses.   A&O x3, thought processes congruent, non-tangential. No hallucinations/delusions. Insight/judgment: intact. Denies suicidal/homicidal ideations.  On exam of her right hand on the dorsum of her hand over the PIP joint on the third finger she has a large wart appreciated.  There is no evidence for secondary infection at all.  There is some crusted skin over the top of it which appears to be relatively thick.        LABS:    Recent Results (from the past 240 hour(s))   Hemoglobin   Result Value Ref Range    Hemoglobin 11.6 (L) 12.0 - 16.0 g/dL   Lipid Cascade RANDOM   Result Value Ref Range    Cholesterol 135 <=199 mg/dL    Triglycerides 49 <=149 mg/dL    HDL Cholesterol 58 >=50 mg/dL    LDL Calculated 67 <=129 mg/dL    Patient Fasting > 8hrs? No    Urinalysis Macroscopic   Result Value Ref Range    Color, UA Yellow Colorless, Yellow, Straw, Light Yellow    Clarity, UA Clear Clear    Glucose, UA Negative Negative    Bilirubin, UA Negative Negative    Ketones, UA Negative Negative    Specific Gravity, UA 1.010 1.005 - 1.030    Blood, UA Small (!) Negative    pH, UA 6.0 5.0 - 8.0    Protein, UA Negative Negative mg/dL    Urobilinogen, UA 0.2 E.U./dL 0.2 E.U./dL, 1.0 E.U./dL    Nitrite, UA Negative Negative    Leukocytes, UA Negative Negative   Urine,Microscopic   Result Value Ref Range    Bacteria, UA Few (!) None Seen hpf    RBC, UA 0-2 None Seen, 0-2 hpf    WBC, UA 0-5 None Seen, 0-5 hpf    Squam Epithel, UA 0-5 None Seen, 0-5 lpf   HPV High Risk DNA Cervical   Result Value Ref Range    HPV Source [facility]     HPV16 DNA Negative NEG    HPV18 DNA Negative NEG    Other HR HPV Negative NEG    Final Diagnosis SEE NOTES     Specimen Description Cervical Cells

## 2021-06-17 NOTE — PROGRESS NOTES
Assessment:   The encounter diagnosis was Acute UTI (urinary tract infection).     Plan:     Medications Ordered   Medications     nitrofurantoin, macrocrystal-monohydrate, (MACROBID) 100 MG capsule     Sig: Take 1 capsule (100 mg total) by mouth 2 (two) times a day for 5 days.     Dispense:  10 capsule     Refill:  0     Patient Instructions     Based on the information that you have provided, I have placed an order for you to start treatment.  View your full visit summary for details. Click on the link below to access your visit summary.    Your pharmacist will address any questions you may have about taking the medication.  If you don't see improvement after 3 days of treatment I would recommend you come in for an appointment to discuss these symptoms. You are able to schedule an appointment within [facility] at your convenience.    If you do need to come in for this same symptom within the next seven days, your eVisit will be free of charge.      Return for further follow up if needed. Call [phone]) or schedule an appointment via [facility]..    Subjective:   Yeimy Montelongo is a 38 y.o. female who submitted an eVisit request for evaluation of her Dysuria.  See the questionnaire and message section of encounter report for information related to history of present illness and review of systems.    The following portions of the patient's history were reviewed and updated as appropriate:  She  does not have any pertinent problems on file.  She has No Known Allergies..     Objective:   No exam performed today, patient submitted as eVisit.

## 2021-06-17 NOTE — TELEPHONE ENCOUNTER
"----- Message from Jessie Gardner sent at 2021 11:04 PM CDT -----  Regarding: Cancellation of Order # [id number]  Order number [id number] for the procedure IRON AND TRANSFERRIN   IRON BINDING CAPACITY [FCV185] has been canceled by Admin, Epic   [ADMIN]. This procedure was ordered by Tabatha Garrett MD   [[id number]] on Mar 1, 2021 for the patient Yeimy Montelongo   [[id number]]. The reason for cancellation was \"Order \".    This was a future order.  "

## 2021-06-19 NOTE — PROGRESS NOTES
SUBJECTIVE:   Date of visit: 2018    Yeimy Montelongo is a 38 y.o.  presents c/o missed period, nausea, breast tenderness for past 10 days. Patient's last menstrual period was 2018 (approximate). (sure, normal, came at expected time). Her periods are regular. Home UPT on 18 were positive, so she believes she is pregnant. She is happy about this.  Her first pregnancy required going to fertility clinic.  They actually did get pregnant spontaneously but had the appointment made and were pursuing fertility workup when she became pregnant so they are very excited about this pregnancy.  It is completely unexpected.  They have been trying for a year and had not achieved a pregnancy so it pretty much given up and now had a positive pregnancy test.  There is still getting used to the idea that they became pregnant without as much hassles this time.  She has not started prenatal vitamins but will get some over the counter. She had problems with prenatal vitamins causing sickness with her last pregnancy and thus was hesitate to start them. We discussed there are many different kinds of them at the store to try.  No FM yet. No bleeding or cramping.  She does not have any exposure to cat litter.  She is aware to avoid using hot tubs or jacuzzi's for which the temperature cannot be adjusted.  She has no other concerns.  Patient notes that she has been suspicious that she was pregnant for probably last week or so.  Last menstrual period was 2018.  It was normal menstrual cycle for her but it did last a little bit shorter amount of time.  It came at the correct time however.  She started becoming nauseated last Thursday and thought perhaps she was pregnant.  She rarely misses a menstrual cycle and therefore when she missed her cycle she was suspicious.  She thought her menstrual cycle would come sometime in the week of 2018 but it has not.  She also notes that she has been having migraine headaches  in the morning which is something that happened with her previous pregnancy as well.  She really has not a breast tenderness.  She did have 2 days of spotting the week of 2018 but has not had any spotting since then.  When I asked her more about that she notes that she literally had 2 spots of blood at varying ×1 day and has not had anything since.  She feels very fatigued.  The nausea that she is having is similar to what she had with her first baby.    No current outpatient prescriptions on file.  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     low egg count, took 5 years to get pregnant with her first     Migraine      Social History     Social History     Marital status:      Spouse name: Aristeo Blackmon     Number of children: 1     Years of education: N/A     Occupational History     process disability forms      Social History Main Topics     Smoking status: Never Smoker     Smokeless tobacco: Never Used     Alcohol use No      Comment: rarely before pregnant     Drug use: No     Sexual activity: Yes     Partners: Male      Comment: open to pregnancy     Other Topics Concern     Not on file     Social History Narrative     OB History    Para Term  AB Living   2 1 1 0 0 1   SAB TAB Ectopic Multiple Live Births   0 0 0 0 1      # Outcome Date GA Lbr Eugene/2nd Weight Sex Delivery Anes PTL Lv   2 Current            1 Term 16 39w2d 10:10 / 00:48 7 lb 2 oz (3.232 kg) M Vag-Spont EPI N JAI      Birth Comments: neg GBS, spont labor, pit augmentation after epidural, progressed nicely, pushed 48 minutes, Right nuchal hand and arm and elbow at birth, large vaginal tear          OBJECTIVE:   /70 (Patient Site: Left Arm, Patient Position: Sitting, Cuff Size: Adult Regular)  Pulse 92  Wt 134 lb 6 oz (61 kg)  LMP 2018 (Approximate)  SpO2 99%  Breastfeeding? No  BMI 19.28 kg/m2  NAD, A&Ox3. Normal affect. No respiratory distress. VS normal (see  above).    REVIEW OF SYSTEMS:  General:  Denies fever, chills, HA, fatigue, myalgias, weight change    Eyes: Denies vision changes   Ears/Nose/Throat: Denies nasal congestion, rhinorrhea, ear pain or discharge, sore throat, swollen glands  Cardiovascular: Denies CP, palpitations  Respiratory: Denies SOB, cough  Gastrointestinal:  Denies changes in bowel habits, melena, rectal bleeding,  Genitourinary: Denies changes in urine habits/frequency/dysuria, hematuria   Musculoskeletal:  Denies  joint pain or swelling or erythema, edema  Skin: Denies rashes   Neurologic: Denies weakness, paresthesia  Psychiatric: Denies mood changes   Endocrine: Denies polyuria, polydipsia, polyphagia  Heme/Lymphatic: Denies problem with bleeding   Allergic/Immunologic: Denies problem       Recent Results (from the past 240 hour(s))   Pregnancy, Urine   Result Value Ref Range    Pregnancy Test, Urine Positive (!) Negative        ASSESSMENT/PLAN:     Supervision of normal pregnancy [Z34.90]    1. Supervision of normal pregnancy  -  OB < 14 Weeks With Transvaginal; Future    2. Amenorrhea  - Pregnancy, Urine        1) Amenorrhea: secondary to pregnancy - see #2 below.     2) PRENATAL:  at 5w6d by LMP, giving Estimated Date of Delivery: 19. Declined 1st trimester genetic screening. Education: discussed usual 1st OB education; reviewed prenatal folder, info given on [facility] website. Avoid alcohol and tobacco; minimize caffeine to <2 servings per day; advised regular physical activity; avoid abdominal trauma;  drink >/= 8 cups water/fluids daily; start/continue PNV.  Discussed morning sickness and the importance of frequent small meals in order to decrease nausea.  If she gets to the point that she is unable to keep food/liquids down, she should contact me as we can provide her with medications to help.    Patient given information on pregnancy today.  Next visit 6 weeks.  Will get an ultrasound to establish EDC.  Order placed  today.  She will call and schedule that herself.  If she has difficulty scheduling it she certainly should let me know.  All questions answered.  Total time spent with patient was at least 25 minutes, all of which was spent in counselling and coordination of care regarding her pregnancy.   Tabatha Garrett MD

## 2021-06-19 NOTE — PROGRESS NOTES
I called and spoke with patient regarding how she was doing.  She started having brownish spotting about a week ago.  Ultrasound at that time showed that she had a viable pregnancy at 7 weeks gestation.  She continues to have bleeding although she thinks it may be getting a tiny bit better.  She continues to have brownish spotting both when she wipes as well as on her panty liner.  I think it would be important to go ahead and get another ultrasound in about a week to establish fetal viability and make sure that things are progressing and growing appropriately.  I did place an order and she will call and schedule that for about a week from now.  All her questions were answered today.  She still continues to feel pregnant and is quite nauseated.  She will let me know if she has additional questions or concerns.  I will contact her with results of the ultrasound when it returns.

## 2021-06-19 NOTE — PROGRESS NOTES
PROGRESS NOTE   7/2/2018    SUBJECTIVE:  Yeimy Montelongo is a 38 y.o. female  who presents for   Chief Complaint   Patient presents with     Vaginal Bleeding     today     Patient comes in today for evaluation of vaginal bleeding.  She was in last week with positive pregnancy test and we did confirm her pregnancy.  This is her second pregnancy.  Her first pregnancy they needed 5 years to get pregnant and they went to the infertility clinic although they were pregnant at the time they went to the infertility clinic.  This is a spontaneous pregnancy and they were thrilled with the thought that they were pregnant again.  Patient notes that she was feeling well until this morning when she went to the bathroom and she wiped and she saw a little bit of brownish discharge on the toilet paper.  She got dressed and then she wiped again before she left the house and she saw a little bit of brown on the toilet paper.  She is noticed a couple of drops of blood in the toilet itself but she has not noticed any period type bleeding.  She has not had any red blood it has all been dark brown in color.  She has not had any cramping at all.  She comes in today concerned over a possible miscarriage.  She should be 6 weeks 6 days today.  Blood type is O+.    Patient Active Problem List   Diagnosis     Migraine Headache       No current outpatient prescriptions on file.     No current facility-administered medications for this visit.        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 No pertinent surgical history.    History   Smoking Status     Never Smoker   Smokeless Tobacco     Never Used       OBJECTIVE:     /58 (Patient Site: Left Arm, Patient Position: Sitting, Cuff Size: Adult Regular)  Pulse (!) 110  Wt 134 lb (60.8 kg)  LMP 05/16/2018 (Approximate)  SpO2 100%   Breastfeeding? No  BMI 19.23 kg/m2    Physical Exam:  GENERAL APPEARANCE: A&A, NAD, well hydrated, well nourished  SKIN:  Normal skin turgor, no lesions/rashes   EXTREMITY: no swelling noted.  Full range of motion of all 4 extremities.   NEURO: no gross deficits   PSYCHIATRIC;  Mood appropriate, memory intact    LABS:     Recent Results (from the past 240 hour(s))   Pregnancy, Urine   Result Value Ref Range    Pregnancy Test, Urine Positive (!) Negative   Beta-hCG, Quantitative   Result Value Ref Range    Beta-hCG, Quantitative 414963 (H) 0 - 4 mlU/mL       ASSESSMENT/PLAN:     Vaginal bleeding in pregnancy, first trimester [O20.9]      1. Vaginal bleeding in pregnancy, first trimester  - Beta-hCG, Quantitative  - Beta-hCG, Quantitative; Future    Patient overall seems to be doing okay.  She should be 6 weeks 6 days today in terms of pregnancy.  She started having some brown spotting this morning.  It has persisted pretty much through the day.  It is only been when she is wiped and she has not noticed any bleeding there would be enough to collect on a pad.  She does not have any cramping at all.  We discussed that it is impossible really to know what is happening.  Brown blood is often old blood and so she could be just bleeding and clearing out some old blood that was in the vaginal area or this could be the beginning of a miscarriage.  At this point she is 6 weeks 6 days and so we should be able to see something on ultrasound so we will go ahead and order an ultrasound today.  This should be done later today.  I also going to go ahead and get a quantitative hCG today and then will also have her return on Thursday for another quantitative hCG.  We spent a long time today discussing miscarriage.  Her blood type is O+ so we she does not need RhoGam.  Certainly at this point is not a whole lot we can do to prevent a miscarriage.  If is going to happen it is it will happen on its own.  The best thing we can do at this  point is to hopefully get her out of this unknown place as to whether or not this pregnancy is going to progress as it normally should.  I am hoping that the beta hCGs will be indicative of that and hopefully the ultrasound today will show definitively what is going on.  All of her questions were answered today.  She was sent for an ultrasound later in the day.  Ultrasound did show normal pregnancy.  Fetal heart tones were 149 and baby measured 7 weeks 3 days.  Patient was quite reassured about this normal ultrasound.  At this point I have asked her to continue to monitor symptoms carefully.  If the bleeding stops then we certainly will see her back for her first OB appointment around 12 weeks gestation.  If she has persistent bleeding she certainly should let me know as well.  All of her questions were answered today.  Tabatha Garrett MD

## 2021-06-19 NOTE — PROGRESS NOTES
Infection History   - Live with someone with TB or exposed to TB No  - Patient reported with history of genital herpes No  - Rash or viral illness since LMP   No  - Prior GBS infected child    No  - Hepatitis B or C     No  - Gonorrhea      No  - Chlamydia      No  - HPV       No  - HIV       No  - Syphilis      No  - Other STI's      No  - Other (see comments)    No      Genetic Screening/Teratology Counseling  - Patient's age 35 years or older as of estimated date of delivery  Yes  - Thalassemia    (Italian, Greek, Mediterranean, or  background) No  - MCV less than 80       No   - Neural tube defects    (meningomyelocele, spina bifida, or anencephaly)  No  - Congenital heart defect      No  - Down syndrome       No  - Gabriel-Sachs (Ashkenazi Oriental orthodox, Cajun, Greenlandic Power)   No  - Canavan Disease (Ashkenazi Oriental orthodox)    No  - Familial Dysautonomia (Ashkenazi Oriental orthodox)    No  - Sickle cell disease or trait ()     No  - Hemophilia or other blood disorders    No  - Muscular dystrophy       No  - Cystic fibrosis       No  - [place]'s chorea       No  - Mental retardation/autism       No   (If yes, was the patient tested for fragile X?)     - Other inherited genetic or chromosomal disorders   No  - Maternal metabolic disorders (type 1 diabetes, PKU)  No  - Patient or baby's father had a child with birth defects   No  - Recurrent pregnancy loss, or stillbirth    No  - Medications   (including supplements, vitamins, herbs, OTC drugs)   /illicit/recreational drugs/alcohol since last LMP    list agents and strength, dosing    No  Any other (see comments)      No      Discussed in today's office visit  HIV and other routine prenatal tests     Discussed, will get hepatitis B, HIV, syphilis testing today.  We will also obtain chlamydia and gonorrhea testing today.  Risk Factors Identified by Prenatal history   Advanced Maternal Age  Anticipated course of prenatal care    Discussed, need to see me every 4 weeks until  24 weeks gestation then every 2 weeks until 36 weeks gestation and then every week until delivery was discussed.  Nutrition and weight gain counseling: special diet   Discussed  Toxoplasmosis precautions (Cats/Raw meat)  Discussed, no exposure to cat litter.  Sexual activity       Discussed  Exercise       Discussed  Influenza vaccine      Discussed, will get in the fall  Smoking counseling      Non smoker  Environmental/work hazards     None identifed  Travel        Discussed  Tobacco (asked, advise, assess, assist and arrange) No tobacco use  Alcohol       No alcohol  Illicit/recreational drugs     Patient denies  Use of any meds   (including supplements, vitamins, herbs, OTC drugs) Discussed  Indications for ultrasound     Discussed, will plan to get level II ultrasound at 20 weeks gestation to check fetal anatomy and well being given advanced maternal age.   Domestic violence      Patient denies  Seat Belt Use        Discussed  Childbirth classes/hospital facilities    Discussed, plans to deliver at [facility].  Dental care       Discussed  Avoidance of saunas or hot tubs    Discussed  Teratogens        Discussed  Breast-feeding       Discussed, is undecided whether she would like to breast-feed or bottle feed at this time.  Screening for aneuploidy     Discussed, discussed at length today, patient declines any genetic testing today.        Patient is here today for her first OB appointment.  She is overall feeling okay.  She continues to have a lot of nausea and vomiting.  She thinks it has actually gotten worse than it was earlier in the pregnancy.  She feels less fatigued than she did with her previous pregnancy but the nausea and vomiting is much worse than her previous pregnancy.  She has lost about 5 pounds at this point.  She is able to nipple on some foods and is eating ice but is having a hard time drinking any fluids.  She is urinating about 3 or 4 times a day.  She feels like overall she is doing okay.   We talked about the fact that she is almost 12 weeks now and so hopefully this will start to resolve here soon.  She continues on a stool softener which she needed to have with her last pregnancy as well.  She believes that is Colace.  It definitely is a red pill that she takes on a daily basis.  She has been able to keep down her prenatal vitamins as well.  She has been having more migraine headaches and Tylenol has not been helping her at all.  We discussed the fact that really there is no other migraine medication that she can use while pregnant.  At this point she would like to continue to monitor her headaches but if she has worsening of them will certainly let me know.  She is over the age of 35 and we discussed genetic testing.  She is not interested in any kind of genetic testing at this point.  We will plan to do level 2 ultrasound at 20 weeks gestation given her advanced maternal age.  Risk assessment was done today.  Full physical exam was done today.  Please see dictation below.  She thinks that she probably is going to decide to bottle feed with this baby.  She tried to breast-feed with her last baby and did not go well and she thinks that she is probably going to elect to bottle feed but is not 100% sure about that at this time.  She did have some bleeding early on in pregnancy but that has completely resolved.  She is overall feeling like his first pregnancy things seem to be progressing fine and has no other concerns today.  All of her questions were answered today.  We will see her back in 4 weeks for her next OB visit. Total time spent with patient was at least 45 minutes,  of which greater than fifty percent was spent in counselling and coordination of care regarding her pregnancy.               PRENATAL VISIT   FIRST OBSTETRICAL EXAM - OB    Assessment / Impression     Normal first prenatal visit at 11w6d  Discussed orientation, general information, lifestyle, nutrition, exercise,warning signs,  resources, lab testing, risk screening  with patient.  Questions answered.    Plan:      Initial labs drawn.  She will continue on prenatal vitamins.  Problem list reviewed and updated.  Genetic screening test options discussed:  Patient declines all genetic testing.  Role of ultrasound in pregnancy discussed; fetal survey: We will plan to get level 2 ultrasound at 20 weeks gestation to check fetal anatomy especially given advanced maternal age..  Follow up: Return in about 4 weeks (around 2018) for next prenatal visit.      Subjective:    Yeimy Montelongo is a 38 y.o.  here today for her First Obstetrical Exam.   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 No pertinent surgical history.  Social History   Substance Use Topics     Smoking status: Never Smoker     Smokeless tobacco: Never Used     Alcohol use No      Comment: rarely before pregnant     Current Outpatient Prescriptions   Medication Sig Dispense Refill     pyridoxine, vitamin B6, (B-6) 25 MG tablet Take 1 tablet (25 mg total) by mouth daily. 90 tablet 0     No current facility-administered medications for this visit.      No Known Allergies          High Risk Behavior: Age is <18 or >35    Review of Systems  General:  Denies problem  Eyes: Denies problem  Ears/Nose/Throat:  Denies problem  Cardiovascular: Denies problem  Respiratory:  Denies problem  Gastrointestinal:  Denies problem, Genitourinary: Denies problem  Musculoskeletal:  Denies problem  Skin: Denies problem  Neurologic: Denies problem  Psychiatric: Denies problem  Endocrine: Denies problem  Heme/Lymphatic: Denies problem   Allergic/Immunologic: Denies problem       Objective:   Objective    Vitals:    08/03/18 1616   BP: 100/64   Pulse: 95   SpO2: 100%   Weight: 128 lb 5 oz (58.2 kg)     Physical Exam:  General Appearance: Alert, cooperative, no distress, appears stated age  Head: Normocephalic, without obvious abnormality, atraumatic  Eyes: PERRL, conjunctiva/corneas clear, EOM's intact  Ears: Normal TM's and external ear canals, both ears  Nose: Nares normal, septum midline,mucosa normal, no drainage  Throat: Lips, mucosa, and tongue normal; teeth and gums normal  Neck: Supple, symmetrical, trachea midline, no adenopathy;  thyroid: not enlarged, symmetric, no tenderness/mass/nodules  Back: Symmetric, no curvature, ROM normal, no CVA tenderness  Lungs: Clear to auscultation bilaterally, respirations unlabored  Breasts: No breast masses, tenderness, asymmetry, or nipple discharge.  Heart: Regular rate and rhythm, S1 and S2 normal, no murmur, rub, or gallop  Abdomen: Soft, non-tender, bowel sounds active all four quadrants,  no masses, no organomegaly  Pelvic:Normally developed genitalia with no external lesions or eruptions. Vagina and cervix show no lesions, inflammation, discharge or tenderness. No cystocele, No rectocele. Uterus 12 week size, anteflexed uterus.  No adnexal mass or tenderness.  Extremities: Extremities normal, atraumatic, no cyanosis or edema  Skin: Skin color, texture, turgor normal, no rashes or lesions  Lymph nodes: Cervical, supraclavicular, and axillary nodes normal  Neurologic: Normal     Lab:   Results for orders placed or performed in visit on 08/03/18   Culture, Urine   Result Value Ref Range     Culture No Growth    HIV Antigen/Antibody Screening [facility]   Result Value Ref Range    HIV Antigen / Antibody Negative Negative   Thyroid Stimulating Hormone (TSH)   Result Value Ref Range    TSH 0.14 (L) 0.30 - 5.00 uIU/mL   Urinalysis Macroscopic   Result Value Ref Range    Color, UA Yellow Colorless, Yellow, Straw, Light Yellow    Clarity, UA Clear Clear    Glucose, UA Negative Negative    Bilirubin, UA Negative Negative    Ketones, UA Negative Negative    Specific Gravity, UA >=1.030 1.005 - 1.030    Blood, UA Trace (!) Negative    pH, UA 6.5 5.0 - 8.0    Protein, UA 30 mg/dL (!) Negative mg/dL    Urobilinogen, UA 0.2 E.U./dL 0.2 E.U./dL, 1.0 E.U./dL    Nitrite, UA Negative Negative    Leukocytes, UA Negative Negative   Syphilis Screen, [facility]   Result Value Ref Range    Treponema Antibody (Syphilis) Negative Negative   HM1 (CBC with Diff)   Result Value Ref Range    WBC 6.4 4.0 - 11.0 thou/uL    RBC 4.24 3.80 - 5.40 mill/uL    Hemoglobin 12.2 12.0 - 16.0 g/dL    Hematocrit 37.7 35.0 - 47.0 %    MCV 89 80 - 100 fL    MCH 28.8 27.0 - 34.0 pg    MCHC 32.4 32.0 - 36.0 g/dL    RDW 13.1 11.0 - 14.5 %    Platelets 300 140 - 440 thou/uL    MPV 10.4 8.5 - 12.5 fL    Neutrophils % 68 50 - 70 %    Lymphocytes % 24 20 - 40 %    Monocytes % 8 2 - 10 %    Eosinophils % 1 0 - 6 %    Basophils % 0 0 - 2 %    Neutrophils Absolute 4.3 2.0 - 7.7 thou/uL    Lymphocytes Absolute 1.5 0.8 - 4.4 thou/uL    Monocytes Absolute 0.5 0.0 - 0.9 thou/uL    Eosinophils Absolute 0.1 0.0 - 0.4 thou/uL    Basophils Absolute 0.0 0.0 - 0.2 thou/uL   Hepatitis B Surface Antigen (HBsAG)   Result Value Ref Range    Hepatitis B Surface Ag Negative Negative   HML Antibody Screen   Result Value Ref Range    HML Antibody Screen Negative Negative

## 2021-06-20 NOTE — PROGRESS NOTES
Patient is overall doing well.  She is still continuing to feel nauseated.  She is vomiting maybe once a day which is a little bit better than it was last time she was here.  She did have a low TSH when she was here for her first OB appointment at 12 weeks gestation I will go ahead and repeat that today.  She also had protein in her urine at 12 weeks gestation so we will repeat that today as well.  She thinks that she started to feel baby movement notes but it certainly not any consistent basis yet.  They declined any kind of genetic testing.  She denies any bleeding or contractions.  Will schedule level 2 ultrasound for sometime between 18 and 20 weeks gestation.  Referral was placed today and will be faxed [facility].  I did discuss the fact that they should receive a phone call from [facility] in order to schedule this.  If they do not hear from them by the first part of next week they certainly should let me know.  We will see her back in 4 weeks for her next OB visit.  All of their questions were answered today.

## 2021-06-20 NOTE — PROGRESS NOTES
She is overall doing well.  She is feeling movement of baby.  She denies that she is having any bleeding or contractions.  She is still is nauseated and still is having ups and downs throughout the day in terms of her nausea and whether or not she can eat.  She notes that on the weekends she tends to feel better than she does during the week.  She notes that she snacks all day at work and she only eats 3 small meals at home on the weekends and she thinks that may be contributing to this.  She will continue to monitor her nausea for now.  She is able to eat something and drink something and keep herself hydrated.  She thinks that overall things are getting a little bit better which is great news.  They are having a girl.  She did have a level 2 ultrasound and everything looked fine other than there was a marginal placenta previa noted.  She does need a repeat ultrasound at between 26 and 28 weeks gestation to check for placental location again and will order that at her next visit.  We can do that as a regular ultrasound she does not need to go back to [facility].  We will need to do 1 hour glucose tolerance test as well as UA UC and hemoglobin next time she is here.  We did discuss the fact that because of the marginal placenta they should not have intercourse and they were aware of that already.  She will continue to monitor the nausea and will let me know if it gets worse.  Certainly if she is unable to keep anything down she needs to let me know.  We otherwise will see her in about 4 weeks for her next OB visit. Will give flu shot next visit

## 2021-06-21 NOTE — PROGRESS NOTES
Patient is overall doing well.  She is feeling better.  She is eating on a regular basis.  She has had a nice weight gain since last time she was here.  Last time she was here she did a 1 hour glucose tolerance test which was high and she did come back for 3-hour glucose tolerance test and that was normal.  She does have an ultrasound scheduled for the end of November to recheck the marginal placenta that was found at 20 weeks gestation.  She is feeling lots of baby movement and denies that she is having any bleeding.  She will occasionally have a [place] Chong contraction but nothing on any regular basis.  She has noticed that she has had some headaches.  She is been using Tylenol occasionally and we talked about trying to drink more water as hopefully that will help as well.  She admits that she does not drink nearly as much water as she should so she is going to try to work on that and see how she does.  Overall she feels like things are doing well.  All of their questions were answered today.  We will see her back in about 2 weeks for her next OB visit.  We will need to get Tdap vaccine at that time.  She has already had her flu vaccine.  Will need repeat RPR testing at that point as well.

## 2021-06-21 NOTE — PROGRESS NOTES
Patient is overall doing okay.  She is feeling okay.  The nausea seems to be getting a little bit better.  She is not had any vomiting but she definitely is still feeling nauseated.  She thinks that she has more good days than bad days.  She is now gaining weight which is excellent.  She is above where she was when she started the pregnancy which is excellent.  We will go ahead and give a flu shot today.  She does have an ultrasound scheduled for the end of November to recheck the placenta position.  She was found to have a marginal placenta previa on her 20-week ultrasound.  She denies that she is having any bleeding.  She is feeling lots of baby movement.  She denies that she is having any contractions.  1 hour glucose tolerance test as well as UA UC and hemoglobin were done today.  We did also repeat vitamin D level as it was low at her 12-week visit.  She did have an elevated 1 hour glucose tolerance test and therefore will need to return for 3-hour glucose tolerance test within the next couple of weeks.  She overall feels like things are doing okay.  All of her questions were answered today.  We will see her back in 2 weeks for her next OB visit.  I did have her set up appointments for the rest of her pregnancy today as well.

## 2021-06-22 NOTE — PROGRESS NOTES
Patient is overall doing well.  She is feeling lots of baby movement.  She denies that she is having any bleeding or contractions.  She does need a Tdap today and will also repeat RPR today.  She does have an ultrasound later today to follow-up on the marginal placenta previa that was noted at her ultrasound at 20 weeks.  She has not had any bleeding or any other evidence of abnormality in regards to the placenta.  Has been trying to drink more water and that seems to be helping with her headaches.  She has already had a flu vaccine this season.  Patient will proceed to ultrasound following this appointment and then I will call her with the results of that.  If she still continues to have a low-lying placenta or if there is any concern about the placental location we probably will need to do another ultrasound later on in the pregnancy and I did briefly explain that to them.  We will await the results of today's ultrasound and then to determine a plan in terms of whether or not we need to repeat another one this pregnancy.  All of their questions were answered today.  We will see her back in 2 weeks for her next OB visit.

## 2021-06-22 NOTE — PROGRESS NOTES
Patient is overall doing well.  She is feeling lots of baby movement.  She denies that she is having any bleeding or contractions.  She overall is feeling like things are progressing as they should be.  She is already gotten her Tdap vaccination and had flu vaccination.  She is still working with her insurance to see if they will pay for a breast pump and how to go about getting a breast pump.  They know where to go and do not feel like they need to take another tour of the hospital.  The last baby was born at [facility].  We will plan to do another ultrasound when she is 34 weeks gestation to recheck placental location.  Order was placed for that today and she will call to schedule that appointment herself.  If she has difficulty scheduling the appointment will certainly let me know.  All of her questions were answered today.  We will see her back in 2 weeks for her next OB visit.

## 2021-06-22 NOTE — PROGRESS NOTES
She is overall doing okay.  She is feeling a loss of baby movement.  She denies that she is having any bleeding.  She denies that she is having any regular contractions.  She does note that she is having some upper back pain as well as mid back pain.  She notes that the pain gets quite bad when she stands for too long.  We talked about trying to get a pregnancy support belt which could help the low back pain but I am not sure how much is going to help the mid back pain.  She has decided that she would like to try to breast-feed.  She is going to call her insurance and find out how she goes about getting a breast pump.  She overall feels like things are doing well.  All of her questions were answered today.  We will see her back in 2 weeks for her next OB visit.  We will plan to repeat ultrasound at 34 weeks to recheck placental location however her last ultrasound showed that the placenta was 2.5 cm from the cervical os.

## 2021-06-23 NOTE — PROGRESS NOTES
Patient is overall doing okay.  She is getting tired and uncomfortable with this pregnancy.  She did have a ultrasound to recheck placental location as well as CHAYITO at [facility].  We did go through that ultrasound result.  Her amniotic fluid volume was 15.1 cm which is in the normal range.  Her placenta is 3.2 cm from the internal os and therefore is fine for her to have a vaginal delivery.  The rest of her ultrasound looked entirely normal.  Group B strep test was done today.  Hemoglobin is also done.  Cervix today is 1-2 cm dilated very soft and about 80% effaced.  Vertex presentation was confirmed.  Patient notes that she is feeling lots of baby movement.  She denies that she is having any bleeding.  I did get a little bit of bleeding with my exam and I discussed with her that this would not be uncommon for her to have a little bit of spotting.  She is having occasional [place] Chong contraction but nothing on any regular basis.  She overall feels like things are following along quite nicely.  All of her and her 's questions were answered today.  She has additional questions or concerns should let me know.  We otherwise will see her next week for her next OB visit.  It is up to her whether or not she wants to be checked.

## 2021-06-23 NOTE — TELEPHONE ENCOUNTER
RN Assessment/Reason for Call:  39 y/o 38 wks who is  and calls to report new onset of contractions, started at 4:30AM and have been consistently for over 1 hour.    RN Action/Disposition  Reviewed protocols, advised caller that the recommendation is she go now to L&D to be evaluated.     RN placed call to nurses station at [facility] and spoke with nurse Eleni, advised them that the patient was on the way.    Christie Car RN Triage Nurse/[facility]  2019   6:55AM    Reason for Disposition    [1] History of prior delivery (multipara) AND [2] contractions < 10 minutes apart AND [3] present 1 hour    Protocols used: PREGNANCY - LABOR-A-AH

## 2021-06-23 NOTE — ANESTHESIA POSTPROCEDURE EVALUATION
Patient: Yeimy Montelongo  * No procedures listed *  Anesthesia type: epidural    Patient location: Labor and Delivery  Last vitals:   Vitals:    02/03/19 1130   BP: 120/58   Pulse: 60   Resp: 14   Temp:    SpO2:      Post vital signs: stable  Level of consciousness: awake and responds to simple questions  Post-anesthesia pain: pain controlled  Post-anesthesia nausea and vomiting: no  Pulmonary: unassisted, return to baseline  Cardiovascular: stable and blood pressure at baseline  Hydration: adequate  Anesthetic events: no    QCDR Measures:  ASA# 11 - Marlee-op Cardiac Arrest: ASA11B - Patient did NOT experience unanticipated cardiac arrest  ASA# 12 - Marlee-op Mortality Rate: ASA12B - Patient did NOT die  ASA# 13 - PACU Re-Intubation Rate: NA - No ETT / LMA used for case  ASA# 10 - Composite Anes Safety: ASA10A - No serious adverse event    Additional Notes:  No apparent complications from epidural.  Patient was in the bathroom at the time of my visit.  Unfortunately, epidural did not have enough time to work after placement as baby was born about 15 minutes after epidural bolus.

## 2021-06-23 NOTE — ANESTHESIA PROCEDURE NOTES
Epidural Block    Patient location during procedure: OB  Time Called: 2/3/2019 8:56 AM  Reason for Block:labor epidural  Preanesthetic Checklist  Completed: patient identified, risks, benefits, and alternatives discussed, timeout performed, consent obtained, airway assessed, oxygen available, suction available, emergency drugs available and hand hygiene performed  Procedure  Patient position: sitting  Prep: ChloraPrep  Patient monitoring: continuous pulse oximetry, heart rate and blood pressure  Approach: midline  Location: L2-L3  Injection technique: GRETTA saline  Number of Attempts:1  Needle  Needle type: Mitch   Needle gauge: 18 G       Assessment  Sensory level:  No complications

## 2021-06-23 NOTE — ANESTHESIA PREPROCEDURE EVALUATION
Anesthesia Evaluation        Airway   Mallampati: II   Pulmonary - negative ROS                          Cardiovascular - negative ROS   Neuro/Psych - negative ROS     Endo/Other    (+) pregnant     GI/Hepatic/Renal            Dental                         Anesthesia Plan  Planned anesthetic: epidural  Labor epidural  ASA 2     Anesthetic plan and risks discussed with: patient and spouse    Post-op plan: routine recovery      Patient requests labor analgesia.  Patient identified.  Medical history reviewed; lab results and allergies reviewed.  Patient interviewed and examined.  Risks(including headache, back pain, low blood pressure, high block and related complications, failed block and/or inadequate analgesia, infection, bleeding and nerve injury), alternatives and procedure discussed and questions answered. Patient gives consent for epidural.  Correct patient, procedure/site verified.Hands washed, sterile gloves and mask worn.

## 2021-06-23 NOTE — PROGRESS NOTES
Patient is overall doing okay.  She is feeling lots of baby movement.  She denies that she is having any bleeding or contractions.  Group B strep testing from last week was negative.  She feels like overall things are doing okay.  She will do the preregistration and she has not completed that yet.  She knows who to call if she were to go into labor.  Cervix today remains 1-2 cm dilated although it is quite soft.  Cervix is more posterior today than it was last week.  Still remains about 80% effaced.  All of her questions were answered today.  We will see her back in 1 week for her next OB visit.

## 2021-06-23 NOTE — PROGRESS NOTES
Patient is overall doing okay.  She is feeling lots of baby movement.  She denies that she is having any bleeding.  She denies that she is having any regular contractions.  She is generally feeling very uncomfortable as the pregnancy progresses but feels like things are progressing as they should be.  She had an ultrasound yesterday to relook at the location of the placenta.  She had a marginal placenta noted at 20 weeks gestation.  She is now 34 weeks gestation and the placenta is still 2.5 cm from the cervical os which is the same as it was a 28 weeks gestation.  Her CHAYITO on this ultrasound was 8 as well.  I would like her to have another ultrasound in about a week to recheck the CHAYITO especially but also to recheck the distance from the placenta to the cervical os.  I am going to have her have this ultrasound at [facility].  Patient and her  both note that they had great difficulty with the ultrasound they did last night and so certainly there is a possibility that both the CHAYITO as well as the distance of the placenta could be off.  Referral was placed to [facility] for repeat ultrasound.  I will contact her with results of that when it returns.  She notes that she is having difficulty with sleeping.  We talked about sleeping medications and she would like to try those.  I did send in a prescription for Vistaril 25 mg which she can take at night.  If she needs she can take 2 of them.  We discussed that even if she takes 2 of the medication we will give her that 50 mg and if she were in the hospital would give her between 100-200 mg of this medication if we were to give her that.  She felt comfortable trying the Vistaril and will let me know how it goes.  She otherwise seems like things are doing fine.  All of their questions were answered today.  All the questions regarding the ultrasound of yesterday were answered today as well.  She will return in 2 weeks for her next OB appointment.  She will need to  have group B strep testing done as well as hemoglobin at that visit.  If they have additional questions or concerns prior to that will let me know.

## 2021-06-23 NOTE — TELEPHONE ENCOUNTER
FYI - Status Update  Who is Calling: error  Update: error  Okay to leave a detailed message?:  No return call needed

## 2021-06-24 NOTE — TELEPHONE ENCOUNTER
Please call patient and see if she truly needs this medication. It was given to her at the end of her pregnancy to help her sleep.

## 2021-06-24 NOTE — TELEPHONE ENCOUNTER
RN cannot approve Refill Request    RN can NOT refill this medication medication not on med list.        Lisha Aragon, [facility] Triage/Med Refill 2/12/2019    Requested Prescriptions   Pending Prescriptions Disp Refills     hydrOXYzine pamoate (VISTARIL) 25 MG capsule [Pharmacy Med Name: HYDROXYZINE MAY 25 MG CAP] 30 capsule 0     Sig: TAKE 1 CAPSULE (25 MG TOTAL) BY MOUTH AT BEDTIME AS NEEDED FOR OTHER (SLEEP).    Antihistamine Refill Protocol Passed - 2/11/2019  1:38 AM       Passed - Patient has had office visit/physical in last year    Last office visit with prescriber/PCP: 6/26/2018 Tabatha Garrett MD OR same dept: 6/26/2018 Tabatha Garrett MD OR same specialty: 6/26/2018 Tabatha Garrett MD  Last physical: Visit date not found Last MTM visit: Visit date not found   Next visit within 3 mo: Visit date not found  Next physical within 3 mo: Visit date not found  Prescriber OR PCP: Tabatha Garrett MD  Last diagnosis associated with med order: There are no diagnoses linked to this encounter.  If protocol passes may refill for 12 months if within 3 months of last provider visit (or a total of 15 months).

## 2021-06-24 NOTE — PROGRESS NOTES
"[facility] Lactation Visit     Assessment:     1.  difficulty in feeding at breast      2. Poor weight gain in         Anne weight gain has greatly improved since last lactation visit two days ago.      Plan:     Continue to feed her 8-12 times per day. Breastfeed on demand as often as desired. Feed on both sides, aim for about 10-15 minutes per side. Aim to keep feedings efficient.      Latch her deeply by making a \"breast sandwich\" and aim for your nipple to go to the roof of her mouth. Re-latch her if you have pain. As your nipples heal and she continues to grow, I expect that the pain will improve. Do breast compressions when she stops nutritive sucking to bring your milk to her and encourage her to keep eating.      Supplementation plan: Continue to supplement about an ounce after you breastfeed her, and then based on her cues. Expect her to want about 2 oz per feeding when she is not nursing for now, but this will likely increase as she gets bigger and stronger.     Plan for one more week of waking her up at night to eat after a 4 hour stretch so that she can gain some \"catch up\" weight, and then after her next weight check you can re-evaluate if it is OK to let her sleep for longer stretches at night.         Recommended to pump: As desired. More pumping will send the message to your body to increase supply, but please balance this with other priorities and enjoy your baby!     Continue to monitor output, expect at least 6 wet diapers per day.   Recommended Vitamin D 400 IU daily.         Continue to apply nystatin cream to nipples 4x/day for 2 weeks. Sterilize bottles and pacifiers daily. Recommended mom request APNO prescription from PCP if her nipple discomfort does not resolve over the next several days.        Follow up in one week with your PCP for a weight check. Be seen sooner for another application of gentian violet if signs of thrush return.            SUBJECTIVE: "      Rita is here today with mom, Yeimy, for lactation support. She is a 2 wk.o. female born at Gestational Age: 38w1d now 20 days.    She is improving with feeding and her weight gain has dramatically improved over the past 2 days after a period of no weight gain of about one week. She has gained 2.4 oz since last visit yesterday. She has gained approximately 2.25 oz per day over the past 2 days.     Baby is nursing about 5 times per day for about 15-20 minutes per side. After every nursing session she is supplemented with about one ounce of expressed breast milk or formula.      Additionally she receives 4-5 bottles of formula of about 2 oz each, she doesn't always finish the bottle but is close.       Mother reports hearing audible swallows.      Mom is also pumping about 4-5 per day and gets about 1.5-2 oz per pumping session.  Number of wet diapers in 24 hours: 8+  Number of stools in 24 hours: 5-6  Color and consistency of stools: yellow seedy     Breastfeeding Goals: Would like to breastfeed as much as possible, but is working to balance this goal with other priorities like caring for other child, sleeping, and enjoying her baby.       Previous Breastfeeding Experience: Gave up shortly after birth with last child.      Breast-surgery:  none              Results for orders placed or performed in visit on 02/10/19   Bilirubin,  Total   Result Value Ref Range     Bilirubin, Total 9.7 (H) 0.0 - 6.0 mg/dL     Age in Hours 168 hours   Bilirubin,  Total   Result Value Ref Range     Bilirubin, Total 9.7 (H) 0.0 - 6.0 mg/dL     Age in Hours 170 hours         Current Outpatient Medications:      nystatin (MYCOSTATIN) cream, Apply to affected areas 4 times per day, Disp: 30 g, Rfl: 0  No past medical history on file.  No past surgical history on file.        Family History   Problem Relation Age of Onset     Rheum arthritis Maternal Grandmother       No Medical Problems Maternal Grandfather               Primary care provider: Tabatha Garrett MD     OBJECTIVE:     Mother:   Nipples are everted, the areola is compressible, the breast is soft and full. Nipples are slightly reddened, but I do not appreciate any shiny areas today.      Sore nipples: Latch is initially uncomfortable but improves after a few moments. It is never entirely comfortable. Pumping continues to be somewhat uncomfortable despite smaller flanges.      Maternal depression screening: Doing well     Infant:      Age today: 20 days     Weight:       Wt Readings from Last 3 Encounters:   02/23/19 5 lb 13.9 oz (2.662 kg) (<1 %, Z= -2.56)*   02/21/19 5 lb 11.5 oz (2.594 kg) (<1 %, Z= -2.61)*   02/20/19 5 lb 9.4 oz (2.534 kg) (<1 %, Z= -2.70)*      * Growth percentiles are based on WHO (Girls, 0-2 years) data.         Birthweight:  5 lb 11 oz (2.58 kg).   Today's weight:    Vitals        Vitals:     02/23/19 1300 02/23/19 1303   Weight: 5 lb 14.4 oz (2.676 kg) 5 lb 13.9 oz (2.662 kg)      . This is 3% from birth weight.   Average weight gain over last 2 days: 2.25 oz/day.        Test weights:        LEFT side: 0.8 oz  RIGHT side: 0.2 oz     TOTAL transfer:  1 oz     Mom fed on the R side shortly before coming to the clinic.         Feeding assessment:      Digital suck assessment:  Infant draws consultant's finger into mouth, palate intact, tongue over gums, normal frenulum.      Baby can hold suction with tongue while at the breast.      Alignment: Baby's head was aligned with its trunk. Baby did face mother. Baby was in cross cradle position today.      Areolar Grasp: Baby was able to open mouth wide. Baby's lips were not pursed. Baby's lips did flange outward. Tongue was visible just barely over bottom lip. Baby had complete seal. Baby's mouth is small and it is challenging to get her to latch deeply. She initially curled her top and bottom lip inward, and we flipped them out with a finger to improve her latch.      Areolar Compression:  Baby made rhythmic motion. There were no clicking or smacking sounds. There was no severe nipple discomfort.  Nipples appeared round after feeding.     Audible swallowing: Baby made quiet sounds of swallowing: There was an increase in frequency after milk ejection reflex. The milk ejection reflex is appropriate. Milk supply appears inadequate to meet all of baby's needs.      Baby is initially vigorous at breast but quickly becomes sleepy. Assisted mom in doing breast compressions and tickling baby's toes to keep her actively sucking for longer.      The visit lasted a total of 60 minutes that I spent face to face with the patient. Of that time, 60 minutes were spent on lactation. Over 50% of the time was spent counseling and educating the patient about normal  care and growth.        Completed by:   LEO Rod, IBCLC, [facility], Pediatrics.  2019 1:04 PM

## 2021-06-24 NOTE — TELEPHONE ENCOUNTER
Patient has yeast infection on her breasts with breastfeeding. Given prescription for all purpose nipple ointment to use.

## 2021-06-24 NOTE — PROGRESS NOTES
"[facility] Pediatrics Lactation Visit     Assessment:     1.  difficulty in feeding at breast      2. Poor weight gain in       3. Thrush  nystatin (MYCOSTATIN) 100,000 unit/mL suspension     nystatin (MYCOSTATIN) cream      Yeimy's \"burning\" pain with breastfeeding is likely a result of yeast infection shared between her and Rita. Despite frequent nursing and pumping, she appears to have a reduced milk supply. Rita's latch looks ideal, and she does not have a tight lingual frenulum.        Plan:     Continue to breastfeed on demand 8-12 times per day as she cues. For now, wake her up after 3 hours, but aim for closer to 2-2.5 hours during the day, one 4 hour stretch at night is ok.       Latch her deeply and do good breast massage while she is nursing.      Supplement with approximately 1-2 oz of expressed breast milk or formula after every feeding, more if she demands. Expect her to eat about 2-3 ounces per feeding when she is not nursing.      Pump as you are able after feeding during the day. More pumping will encourage your body to make more milk. If you can pump at least once overnight, that is great.      Use the nystatin ointment in her mouth 4 times per day, rub into affected areas. Use the cream on your nipples.      Sterilize bottles, pacifiers and pump parts once per day.      Try the smaller flange size (21mm) and see if that is more comfortable. There is a 19mm size as well.      Follow up appointment on Saturday for lactation visit/weight check.   Continue to monitor output, expect at least 6 wet diapers per day.   Recommended Vitamin D 400 IU daily.         Follow up on Saturday for a follow up lactation visit.      Addendum: Pharmacy called and there is a long term back order of Nystatin suspension. Called mom and recommended that she bring Rita into the clinic for gentian violet treatment.      SUBJECTIVE:      Rita is here today with mom, Yeimy, for lactation " support. She is a 2 wk.o. female born at Gestational Age: 38w1d now 17 days.    She is having difficulties with feeding. She has lost 0.6 oz since last visit 5 days ago. She has not gained weight over the past 5 days despite being close to birthweight at last visit on 2/15/19.     Baby is nursing approximately 5 times per day with active sucking for 15-20 minutes on one side, then falls asleep and will comfort nurse for another 20 minutes. She often will not wake up well to nurse on the other side.      She receives 2-3 bottles of expressed breast milk at night. Mom is not nursing overnight, but does pump once. These are typically 2 oz bottles that baby drinks at night. Right now she is waking up every 4.5 hours over night, and then every 3 hours during the day.      Mom is pumping with a Spectra pump after nearly every feeding during the day and then once overnight. Typically she is pumping about 0.5 -1 oz with each pumping session following nursing. When she pumps in place of nursing she gets more, usually about 2-2.5 oz total.      Yesterday mom just bottle fed and pumped instead to help heal her nipples as they have been sore. Pumping has not been comfortable either. She just bought a smaller flange size to try. She got about 2- 2.5 oz total with each pumping session. Mom tried to pump approximately every time baby ate. Rita ate about 2 oz every three hours during the day yesterday.      Mother reports hearing audible swallows.   Baby feeds about 8 times in 24 hours.      Number of wet diapers in 24 hours: 8  Number of stools in 24 hours: 4-5  Color and consistency of stools: yellow seedy  Mom noticed her breasts grew larger and areolas darkened during pregnancy and she noticed primary engorgement when her milk came in on day 3.        Breastfeeding Goals: Hoping to breastfeed as long as she can, or at least before going back to work at 13 weeks.     Previous Breastfeeding Experience:  Gave up with first son  "after 1 week. Pumped for about 8 weeks then quit due to low milk supply. Initially was able to keep up with son's needs when pumping.       Breast-surgery:  No     Hospital course:  Vigorous at delivery with spontaneous cry and respirations. Feeding progressed normally. Voiding and stools were within normal limits. Vitals remained stable throughout hospital stay.              Results for orders placed or performed in visit on 02/10/19   Bilirubin,  Total   Result Value Ref Range     Bilirubin, Total 9.7 (H) 0.0 - 6.0 mg/dL     Age in Hours 168 hours   Bilirubin,  Total   Result Value Ref Range     Bilirubin, Total 9.7 (H) 0.0 - 6.0 mg/dL     Age in Hours 170 hours         Current Outpatient Medications:      nystatin (MYCOSTATIN) 100,000 unit/mL suspension, Take 5 mL (500,000 Units total) by mouth 4 (four) times a day for 10 days., Disp: 200 mL, Rfl: 0     nystatin (MYCOSTATIN) cream, Apply to affected areas 4 times per day, Disp: 30 g, Rfl: 0  No past medical history on file.  No past surgical history on file.        Family History   Problem Relation Age of Onset     Rheum arthritis Maternal Grandmother           Copied from mother's family history at birth     No Medical Problems Maternal Grandfather           Copied from mother's family history at birth            Primary care provider: Tabatha Garrett MD     OBJECTIVE:     Mother:   Nipples are everted, the areola is compressible, the breast is soft. Mom's nipples are red and have shiny areas. There is no blistering or sores.      Sore nipples: Mom has had sore nipples since she started breastfeeding. She describes the pain as \"burning\" and that it lasts for the entire feeding. Pumping has also not been comfortable.      Maternal depression screening: Doing well, EPDS 2     Infant:      Age today: 17 days     Weight:       Wt Readings from Last 3 Encounters:   19 5 lb 9.4 oz (2.534 kg) (<1 %, Z= -2.70)*   02/15/19 5 lb 10 oz (2.551 kg) " "(<1 %, Z= -2.35)*   02/07/19 5 lb 8 oz (2.495 kg) (2 %, Z= -2.00)*      * Growth percentiles are based on WHO (Girls, 0-2 years) data.         Birthweight:  5 lb 11 oz (2.58 kg).   Today's weight:    Vitals        Vitals:     02/20/19 1110 02/20/19 1113   Weight: 5 lb 10 oz (2.551 kg) 5 lb 9.4 oz (2.534 kg)      . This is -2% from birth weight.   Average weight gain over last 5 days: 0 oz/day.        Test weights:  Lactation scale pre-feeding weight: 5 lb 10 oz     LEFT side: 0.5 oz  RIGHT side: 0.4 oz     TOTAL transfer:  0.9 oz        Feeding assessment:      Digital suck assessment: Infant draws consultant's finger into mouth, palate intact, tongue over gums, normal frenulum.      Baby can hold suction with tongue while at the breast.      Alignment: Baby's head was aligned with its trunk. Baby did face mother. Baby was in cradle position today.      Areolar Grasp: Baby was able to open mouth wide. Baby's lips were not pursed. Baby's lips did flange outward. Tongue was visible just barely over bottom lip. Baby had complete seal.      Areolar Compression: Baby made rhythmic motion. There were no clicking or smacking sounds. There was no severe nipple discomfort, though mom felt \"burning\" throughout the feeding.  Nipples appeared rounded after feeding.      Audible swallowing: Baby made quiet sounds of swallowing, approximately 1 time for every 3 sucks at the beginning of the feeding. There was not an obvious increase in frequency after milk ejection reflex. Milk supply appears lower than what baby needs at this time.       Baby tired after approximately 10 minutes at the breast and fell asleep. After weighing her and doing an exam she nursed actively for about 10 more minutes on the opposite side then fell asleep.        The visit lasted a total of 60 minutes that I spent face to face with the patient. Of that time, 60 minutes were spent on lactation. Over 50% of the time was spent counseling and educating the " patient about normal  care and growth.        Completed by:   LEO Rod, IBCLC, [facility], Pediatrics.  2019 11:32 AM

## 2021-07-03 NOTE — ADDENDUM NOTE
Addendum Note by Evelina Hughes MD at 2/5/2019  8:12 PM     Author: Evelina Hughes MD Service: -- Author Type: Anesthesiologist    Filed: 2/5/2019  8:12 PM Date of Service: 2/5/2019  8:12 PM Status: Signed    : Evelina Hughes MD (Anesthesiologist)       Addendum  created 02/05/19 2012 by Evelina Hughes MD    Intraprocedure Blocks edited, Sign clinical note

## 2021-08-02 ENCOUNTER — LAB (OUTPATIENT)
Dept: LAB | Facility: CLINIC | Age: 41
End: 2021-08-02
Payer: COMMERCIAL

## 2021-08-02 DIAGNOSIS — D64.9 ANEMIA, UNSPECIFIED TYPE: ICD-10-CM

## 2021-08-02 LAB
BASOPHILS # BLD AUTO: 0 10E3/UL (ref 0–0.2)
BASOPHILS NFR BLD AUTO: 0 %
EOSINOPHIL # BLD AUTO: 0.1 10E3/UL (ref 0–0.7)
EOSINOPHIL NFR BLD AUTO: 1 %
ERYTHROCYTE [DISTWIDTH] IN BLOOD BY AUTOMATED COUNT: 12.2 % (ref 10–15)
HCT VFR BLD AUTO: 37.9 % (ref 35–47)
HGB BLD-MCNC: 12.5 G/DL (ref 11.7–15.7)
IMM GRANULOCYTES # BLD: 0 10E3/UL
IMM GRANULOCYTES NFR BLD: 0 %
LYMPHOCYTES # BLD AUTO: 1.6 10E3/UL (ref 0.8–5.3)
LYMPHOCYTES NFR BLD AUTO: 23 %
MCH RBC QN AUTO: 30.6 PG (ref 26.5–33)
MCHC RBC AUTO-ENTMCNC: 33 G/DL (ref 31.5–36.5)
MCV RBC AUTO: 93 FL (ref 78–100)
MONOCYTES # BLD AUTO: 0.5 10E3/UL (ref 0–1.3)
MONOCYTES NFR BLD AUTO: 7 %
NEUTROPHILS # BLD AUTO: 4.6 10E3/UL (ref 1.6–8.3)
NEUTROPHILS NFR BLD AUTO: 68 %
PLATELET # BLD AUTO: 280 10E3/UL (ref 150–450)
RBC # BLD AUTO: 4.09 10E6/UL (ref 3.8–5.2)
WBC # BLD AUTO: 6.7 10E3/UL (ref 4–11)

## 2021-08-02 PROCEDURE — 84466 ASSAY OF TRANSFERRIN: CPT

## 2021-08-02 PROCEDURE — 83540 ASSAY OF IRON: CPT

## 2021-08-02 PROCEDURE — 36415 COLL VENOUS BLD VENIPUNCTURE: CPT

## 2021-08-02 PROCEDURE — 82728 ASSAY OF FERRITIN: CPT

## 2021-08-02 PROCEDURE — 85025 COMPLETE CBC W/AUTO DIFF WBC: CPT

## 2021-08-03 LAB
FERRITIN SERPL-MCNC: 26 NG/ML (ref 10–130)
IRON SATN MFR SERPL: 13 % (ref 20–50)
IRON SERPL-MCNC: 48 UG/DL (ref 42–175)
TIBC SERPL-MCNC: 361 UG/DL (ref 313–563)
TRANSFERRIN SERPL-MCNC: 289 MG/DL (ref 212–360)

## 2021-08-23 ENCOUNTER — MYC REFILL (OUTPATIENT)
Dept: FAMILY MEDICINE | Facility: CLINIC | Age: 41
End: 2021-08-23

## 2021-08-23 DIAGNOSIS — G43.809 OTHER MIGRAINE WITHOUT STATUS MIGRAINOSUS, NOT INTRACTABLE: ICD-10-CM

## 2021-08-26 RX ORDER — SUMATRIPTAN 50 MG/1
50 TABLET, FILM COATED ORAL
Qty: 30 TABLET | Refills: 0 | OUTPATIENT
Start: 2021-08-26

## 2021-08-26 RX ORDER — SUMATRIPTAN 50 MG/1
TABLET, FILM COATED ORAL
Qty: 10 TABLET | Refills: 2 | OUTPATIENT
Start: 2021-08-26

## 2021-08-27 NOTE — TELEPHONE ENCOUNTER
Duplicate request.  Rx was signed today.        Closing refill request.      Denise Guy RN   08/26/21 9:43 PM  [facility] Nurse Advisor

## 2021-08-27 NOTE — TELEPHONE ENCOUNTER
Duplicate request.  Rx was signed today.        Closing refill request.      Denise Guy RN   08/26/21 9:43 PM  [facility] Nurse Advisor

## 2021-10-10 ENCOUNTER — HEALTH MAINTENANCE LETTER (OUTPATIENT)
Age: 41
End: 2021-10-10

## 2022-05-21 ENCOUNTER — HEALTH MAINTENANCE LETTER (OUTPATIENT)
Age: 42
End: 2022-05-21

## 2022-05-24 ENCOUNTER — HOSPITAL ENCOUNTER (EMERGENCY)
Facility: CLINIC | Age: 42
Discharge: ANOTHER HOSPITAL | End: 2022-05-24
Attending: EMERGENCY MEDICINE | Admitting: EMERGENCY MEDICINE
Payer: COMMERCIAL

## 2022-05-24 ENCOUNTER — E-VISIT (OUTPATIENT)
Dept: URGENT CARE | Facility: CLINIC | Age: 42
End: 2022-05-24
Payer: COMMERCIAL

## 2022-05-24 ENCOUNTER — OFFICE VISIT (OUTPATIENT)
Dept: FAMILY MEDICINE | Facility: CLINIC | Age: 42
End: 2022-05-24
Payer: COMMERCIAL

## 2022-05-24 ENCOUNTER — APPOINTMENT (OUTPATIENT)
Dept: CT IMAGING | Facility: CLINIC | Age: 42
End: 2022-05-24
Attending: STUDENT IN AN ORGANIZED HEALTH CARE EDUCATION/TRAINING PROGRAM
Payer: COMMERCIAL

## 2022-05-24 VITALS
BODY MASS INDEX: 20.09 KG/M2 | HEART RATE: 78 BPM | DIASTOLIC BLOOD PRESSURE: 70 MMHG | WEIGHT: 140 LBS | RESPIRATION RATE: 16 BRPM | OXYGEN SATURATION: 100 % | TEMPERATURE: 99.8 F | SYSTOLIC BLOOD PRESSURE: 118 MMHG

## 2022-05-24 VITALS
SYSTOLIC BLOOD PRESSURE: 102 MMHG | HEART RATE: 98 BPM | TEMPERATURE: 97.9 F | DIASTOLIC BLOOD PRESSURE: 64 MMHG | WEIGHT: 141.38 LBS | BODY MASS INDEX: 20.29 KG/M2 | RESPIRATION RATE: 14 BRPM | OXYGEN SATURATION: 100 %

## 2022-05-24 DIAGNOSIS — N39.0 ACUTE UTI (URINARY TRACT INFECTION): Primary | ICD-10-CM

## 2022-05-24 DIAGNOSIS — N10 PYELONEPHRITIS, ACUTE: Primary | ICD-10-CM

## 2022-05-24 DIAGNOSIS — R31.9 URINARY TRACT INFECTION WITH HEMATURIA, SITE UNSPECIFIED: ICD-10-CM

## 2022-05-24 DIAGNOSIS — R30.0 DYSURIA: ICD-10-CM

## 2022-05-24 DIAGNOSIS — N20.1 URETERAL STONE: ICD-10-CM

## 2022-05-24 DIAGNOSIS — D72.829 LEUKOCYTOSIS, UNSPECIFIED TYPE: ICD-10-CM

## 2022-05-24 DIAGNOSIS — N39.0 URINARY TRACT INFECTION WITH HEMATURIA, SITE UNSPECIFIED: ICD-10-CM

## 2022-05-24 LAB
ALBUMIN SERPL-MCNC: 4 G/DL (ref 3.5–5)
ALBUMIN UR-MCNC: 100 MG/DL
ALBUMIN UR-MCNC: 50 MG/DL
ALP SERPL-CCNC: 71 U/L (ref 45–120)
ALT SERPL W P-5'-P-CCNC: 13 U/L (ref 0–45)
ANION GAP SERPL CALCULATED.3IONS-SCNC: 13 MMOL/L (ref 5–18)
APPEARANCE UR: ABNORMAL
APPEARANCE UR: ABNORMAL
AST SERPL W P-5'-P-CCNC: 11 U/L (ref 0–40)
BACTERIA #/AREA URNS HPF: ABNORMAL /HPF
BACTERIA #/AREA URNS HPF: ABNORMAL /HPF
BASOPHILS # BLD AUTO: 0 10E3/UL (ref 0–0.2)
BASOPHILS NFR BLD AUTO: 0 %
BILIRUB SERPL-MCNC: 0.6 MG/DL (ref 0–1)
BILIRUB UR QL STRIP: NEGATIVE
BILIRUB UR QL STRIP: NEGATIVE
BUN SERPL-MCNC: 10 MG/DL (ref 8–22)
C REACTIVE PROTEIN LHE: 2.9 MG/DL (ref 0–0.8)
CALCIUM SERPL-MCNC: 9.4 MG/DL (ref 8.5–10.5)
CHLORIDE BLD-SCNC: 107 MMOL/L (ref 98–107)
CO2 SERPL-SCNC: 20 MMOL/L (ref 22–31)
COLOR UR AUTO: YELLOW
COLOR UR AUTO: YELLOW
CREAT SERPL-MCNC: 0.92 MG/DL (ref 0.6–1.1)
EOSINOPHIL # BLD AUTO: 0 10E3/UL (ref 0–0.7)
EOSINOPHIL NFR BLD AUTO: 0 %
ERYTHROCYTE [DISTWIDTH] IN BLOOD BY AUTOMATED COUNT: 12.8 % (ref 10–15)
GFR SERPL CREATININE-BSD FRML MDRD: 80 ML/MIN/1.73M2
GLUCOSE BLD-MCNC: 127 MG/DL (ref 70–125)
GLUCOSE UR STRIP-MCNC: NEGATIVE MG/DL
GLUCOSE UR STRIP-MCNC: NEGATIVE MG/DL
HCG UR QL: NEGATIVE
HCT VFR BLD AUTO: 34.3 % (ref 35–47)
HGB BLD-MCNC: 11.2 G/DL (ref 11.7–15.7)
HGB UR QL STRIP: ABNORMAL
HGB UR QL STRIP: ABNORMAL
IMM GRANULOCYTES # BLD: 0.1 10E3/UL
IMM GRANULOCYTES NFR BLD: 1 %
KETONES UR STRIP-MCNC: 60 MG/DL
KETONES UR STRIP-MCNC: ABNORMAL MG/DL
LEUKOCYTE ESTERASE UR QL STRIP: ABNORMAL
LEUKOCYTE ESTERASE UR QL STRIP: ABNORMAL
LYMPHOCYTES # BLD AUTO: 0.4 10E3/UL (ref 0.8–5.3)
LYMPHOCYTES NFR BLD AUTO: 2 %
MCH RBC QN AUTO: 28.8 PG (ref 26.5–33)
MCHC RBC AUTO-ENTMCNC: 32.7 G/DL (ref 31.5–36.5)
MCV RBC AUTO: 88 FL (ref 78–100)
MONOCYTES # BLD AUTO: 1 10E3/UL (ref 0–1.3)
MONOCYTES NFR BLD AUTO: 6 %
MUCOUS THREADS #/AREA URNS LPF: PRESENT /LPF
NEUTROPHILS # BLD AUTO: 16.3 10E3/UL (ref 1.6–8.3)
NEUTROPHILS NFR BLD AUTO: 91 %
NITRATE UR QL: NEGATIVE
NITRATE UR QL: NEGATIVE
NRBC # BLD AUTO: 0 10E3/UL
NRBC BLD AUTO-RTO: 0 /100
PH UR STRIP: 5 [PH] (ref 5–8)
PH UR STRIP: 6 [PH] (ref 5–7)
PLATELET # BLD AUTO: 310 10E3/UL (ref 150–450)
POTASSIUM BLD-SCNC: 4.2 MMOL/L (ref 3.5–5)
PROT SERPL-MCNC: 6.9 G/DL (ref 6–8)
RBC # BLD AUTO: 3.89 10E6/UL (ref 3.8–5.2)
RBC #/AREA URNS AUTO: ABNORMAL /HPF
RBC URINE: 18 /HPF
SARS-COV-2 RNA RESP QL NAA+PROBE: NEGATIVE
SODIUM SERPL-SCNC: 140 MMOL/L (ref 136–145)
SP GR UR STRIP: 1.01 (ref 1–1.03)
SP GR UR STRIP: 1.01 (ref 1–1.03)
SQUAMOUS #/AREA URNS AUTO: ABNORMAL /LPF
SQUAMOUS EPITHELIAL: 3 /HPF
UROBILINOGEN UR STRIP-ACNC: 0.2 E.U./DL
UROBILINOGEN UR STRIP-MCNC: <2 MG/DL
WBC # BLD AUTO: 17.8 10E3/UL (ref 4–11)
WBC #/AREA URNS AUTO: ABNORMAL /HPF
WBC CLUMPS #/AREA URNS HPF: PRESENT /HPF
WBC CLUMPS #/AREA URNS HPF: PRESENT /HPF
WBC URINE: >182 /HPF

## 2022-05-24 PROCEDURE — 36415 COLL VENOUS BLD VENIPUNCTURE: CPT | Performed by: EMERGENCY MEDICINE

## 2022-05-24 PROCEDURE — 87086 URINE CULTURE/COLONY COUNT: CPT | Performed by: PHYSICIAN ASSISTANT

## 2022-05-24 PROCEDURE — 250N000011 HC RX IP 250 OP 636: Performed by: EMERGENCY MEDICINE

## 2022-05-24 PROCEDURE — 74177 CT ABD & PELVIS W/CONTRAST: CPT

## 2022-05-24 PROCEDURE — 96365 THER/PROPH/DIAG IV INF INIT: CPT | Mod: 59

## 2022-05-24 PROCEDURE — 86140 C-REACTIVE PROTEIN: CPT | Performed by: EMERGENCY MEDICINE

## 2022-05-24 PROCEDURE — 82040 ASSAY OF SERUM ALBUMIN: CPT | Performed by: STUDENT IN AN ORGANIZED HEALTH CARE EDUCATION/TRAINING PROGRAM

## 2022-05-24 PROCEDURE — C9803 HOPD COVID-19 SPEC COLLECT: HCPCS

## 2022-05-24 PROCEDURE — 36415 COLL VENOUS BLD VENIPUNCTURE: CPT | Performed by: STUDENT IN AN ORGANIZED HEALTH CARE EDUCATION/TRAINING PROGRAM

## 2022-05-24 PROCEDURE — U0005 INFEC AGEN DETEC AMPLI PROBE: HCPCS | Performed by: EMERGENCY MEDICINE

## 2022-05-24 PROCEDURE — 258N000003 HC RX IP 258 OP 636: Performed by: EMERGENCY MEDICINE

## 2022-05-24 PROCEDURE — 87040 BLOOD CULTURE FOR BACTERIA: CPT | Performed by: EMERGENCY MEDICINE

## 2022-05-24 PROCEDURE — 81001 URINALYSIS AUTO W/SCOPE: CPT | Performed by: PHYSICIAN ASSISTANT

## 2022-05-24 PROCEDURE — 87186 SC STD MICRODIL/AGAR DIL: CPT | Performed by: PHYSICIAN ASSISTANT

## 2022-05-24 PROCEDURE — 81025 URINE PREGNANCY TEST: CPT | Performed by: STUDENT IN AN ORGANIZED HEALTH CARE EDUCATION/TRAINING PROGRAM

## 2022-05-24 PROCEDURE — 99214 OFFICE O/P EST MOD 30 MIN: CPT | Performed by: PHYSICIAN ASSISTANT

## 2022-05-24 PROCEDURE — 99207 PR NON-BILLABLE SERV PER CHARTING: CPT | Performed by: EMERGENCY MEDICINE

## 2022-05-24 PROCEDURE — 87186 SC STD MICRODIL/AGAR DIL: CPT | Performed by: STUDENT IN AN ORGANIZED HEALTH CARE EDUCATION/TRAINING PROGRAM

## 2022-05-24 PROCEDURE — 80053 COMPREHEN METABOLIC PANEL: CPT | Performed by: STUDENT IN AN ORGANIZED HEALTH CARE EDUCATION/TRAINING PROGRAM

## 2022-05-24 PROCEDURE — 81001 URINALYSIS AUTO W/SCOPE: CPT | Performed by: STUDENT IN AN ORGANIZED HEALTH CARE EDUCATION/TRAINING PROGRAM

## 2022-05-24 PROCEDURE — 96375 TX/PRO/DX INJ NEW DRUG ADDON: CPT

## 2022-05-24 PROCEDURE — 250N000011 HC RX IP 250 OP 636

## 2022-05-24 PROCEDURE — 96366 THER/PROPH/DIAG IV INF ADDON: CPT

## 2022-05-24 PROCEDURE — 99285 EMERGENCY DEPT VISIT HI MDM: CPT | Mod: 25

## 2022-05-24 PROCEDURE — 85025 COMPLETE CBC W/AUTO DIFF WBC: CPT | Performed by: STUDENT IN AN ORGANIZED HEALTH CARE EDUCATION/TRAINING PROGRAM

## 2022-05-24 RX ORDER — IOPAMIDOL 755 MG/ML
100 INJECTION, SOLUTION INTRAVASCULAR ONCE
Status: COMPLETED | OUTPATIENT
Start: 2022-05-24 | End: 2022-05-24

## 2022-05-24 RX ORDER — ONDANSETRON 4 MG/1
4 TABLET, ORALLY DISINTEGRATING ORAL ONCE
Status: COMPLETED | OUTPATIENT
Start: 2022-05-24 | End: 2022-05-24

## 2022-05-24 RX ORDER — CEFTRIAXONE 2 G/1
2 INJECTION, POWDER, FOR SOLUTION INTRAMUSCULAR; INTRAVENOUS ONCE
Status: COMPLETED | OUTPATIENT
Start: 2022-05-24 | End: 2022-05-24

## 2022-05-24 RX ORDER — ONDANSETRON 2 MG/ML
4 INJECTION INTRAMUSCULAR; INTRAVENOUS ONCE
Status: COMPLETED | OUTPATIENT
Start: 2022-05-24 | End: 2022-05-24

## 2022-05-24 RX ORDER — KETOROLAC TROMETHAMINE 15 MG/ML
15 INJECTION, SOLUTION INTRAMUSCULAR; INTRAVENOUS ONCE
Status: COMPLETED | OUTPATIENT
Start: 2022-05-24 | End: 2022-05-24

## 2022-05-24 RX ORDER — IBUPROFEN 200 MG
600-800 TABLET ORAL EVERY 8 HOURS PRN
Status: ON HOLD | COMMUNITY
End: 2024-08-04

## 2022-05-24 RX ADMIN — ONDANSETRON 4 MG: 2 INJECTION INTRAMUSCULAR; INTRAVENOUS at 19:54

## 2022-05-24 RX ADMIN — IOPAMIDOL 100 ML: 755 INJECTION, SOLUTION INTRAVENOUS at 16:18

## 2022-05-24 RX ADMIN — CEFTRIAXONE SODIUM 2 G: 2 INJECTION, POWDER, FOR SOLUTION INTRAMUSCULAR; INTRAVENOUS at 17:58

## 2022-05-24 RX ADMIN — KETOROLAC TROMETHAMINE 15 MG: 15 INJECTION, SOLUTION INTRAMUSCULAR; INTRAVENOUS at 19:55

## 2022-05-24 RX ADMIN — ONDANSETRON 4 MG: 4 TABLET, ORALLY DISINTEGRATING ORAL at 15:23

## 2022-05-24 RX ADMIN — SODIUM CHLORIDE 1000 ML: 9 INJECTION, SOLUTION INTRAVENOUS at 17:49

## 2022-05-24 ASSESSMENT — ENCOUNTER SYMPTOMS
VOMITING: 1
CHILLS: 1
FLANK PAIN: 1
NAUSEA: 1
ABDOMINAL PAIN: 1
DYSURIA: 1

## 2022-05-24 NOTE — PHARMACY-ADMISSION MEDICATION HISTORY
Pharmacy Note - Admission Medication History    Pertinent Provider Information:      ______________________________________________________________________    Prior To Admission (PTA) med list completed and updated in EMR.       PTA Med List   Medication Sig Last Dose     ibuprofen (ADVIL/MOTRIN) 200 MG tablet Take 800 mg by mouth every 8 hours as needed for mild pain 5/24/2022 at am     SUMAtriptan (IMITREX) 50 MG tablet Take 1 tablet (50 mg) by mouth once as needed for migraine Past Month at Unknown time       Information source(s): Patient and CareEverywhere/[facility]  Method of interview communication: in-person    Summary of Changes to PTA Med List  New: IBU  Discontinued: None  Changed: None    Patient was asked about OTC/herbal products specifically.  PTA med list reflects this.    In the past week, patient estimated taking medication this percent of the time:  N/A    Allergies were reviewed, assessed, and updated with the patient.      Patient does not use any multi-dose medications prior to admission.    The information provided in this note is only as accurate as the sources available at the time of the update(s).    Thank you for the opportunity to participate in the care of this patient.    Cornell Wills RPH  5/24/2022 5:21 PM

## 2022-05-24 NOTE — PROGRESS NOTES
Patient presents with:  UTI: Has lower abd pain and back pain  for 2 days has pressure voiding  taking Ibuprofen not helping has her period also      Clinical Decision Making:  Review of the patient's history physical examination and vitals show that she has criteria for SIRS.  Additionally because the patient is demonstrating pyelonephritis I am concerned of possible nephrolithiasis that is causing urosepsis or possible pyelonephritis.  White count was not performed since patient was very sick and had SIRS criteria.  Patient is being sent to the next higher level of care at the emergency room for definitive evaluation and treatment.  A future concern would be to have further labs and CT scan as well as monitoring and IV medication for treatment.  Patient is sent by personal vehicle to the emergency room for evaluation and treatment      ICD-10-CM    1. Pyelonephritis, acute  N10    2. Dysuria  R30.0 UA macro with reflex to Microscopic and Culture - Clinc Collect     Urine Microscopic Exam     Urine Culture       Patient Instructions   Present to the emergency room for evaluation and treatment          HPI:  Yeimy Blackmon is a 42 year old female who presents today for possible urinary tract infection over the last 2 days.  Patient has had right flank pain right upper quadrant right lower quadrant pain that has been worsening over the last 2 days.  She has increased pressure when she is voiding.  She does not have gross hematuria but has had chills fatigue weakness and nausea and vomiting.  Patient does not have a known history of nephrolithiasis    History obtained from chart review and the patient.    Problem List:  Migraine Headache      Past Medical History:   Diagnosis Date     De Quervain's syndrome (tenosynovitis) 09/2019    bilateral     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     low egg count, took 5 years to get pregnant with her first     Migraine      Normal delivery 11/2016     Normal delivery 02/03/2019        Social History     Tobacco Use     Smoking status: Never Smoker     Smokeless tobacco: Never Used   Substance Use Topics     Alcohol use: No     Comment: Alcoholic Drinks/day: 1-2 times a year       Review of Systems  As above in HPI otherwise negative.    Vitals:    05/24/22 1343   BP: 102/64   Pulse: 98   Resp: 14   Temp: 97.9  F (36.6  C)   TempSrc: Oral   SpO2: 100%   Weight: 64.1 kg (141 lb 6 oz)       General: Patient is afebrile in the office but she is having chills and has a low temperature.  He appears to be fatigued and weak and is sleeping on the examination table.  Patient has vomited twice in the office encounter  HEENT: Head is normocephalic atraumatic   eyes are PERRL EOMI sclera anicteric   TMs are clear bilaterally  Throat is clear  No cervical lymphadenopathy present  LUNGS: Clear to auscultation bilaterally  HEART: Regular rate and rhythm  Abdomen: Patient has right-sided CVA tenderness to percussion that is very painful.  She also has right upper quadrant and right lower quadrant tenderness to palpation.  Suprapubic tenderness palpation and mild left lower quadrant abdominal pain to palpation  Skin: Without rash non-diaphoretic    Physical Exam      Labs:  Results for orders placed or performed during the hospital encounter of 05/24/22   CT Abdomen Pelvis w Contrast     Status: None    Narrative    EXAM: CT ABDOMEN PELVIS W CONTRAST  LOCATION: [facility]  DATE/TIME: 5/24/2022 4:14 PM    INDICATION: Urinary tract infection, back pain  COMPARISON: None.  TECHNIQUE: CT scan of the abdomen and pelvis was performed following injection of IV contrast. Multiplanar reformats were obtained. Dose reduction techniques were used.  CONTRAST: ISOVUE 370 100 mL    FINDINGS:   LOWER CHEST: Normal.    HEPATOBILIARY: Prominent gallbladder likely related to recent fasting. No radiodense gallstone, gallbladder wall thickening or surrounding edema. Periportal edema with associated  dilatation of the IVC which can be seen with sequelae of IV hydration. No   biliary ductal dilatation.    PANCREAS: Normal.    SPLEEN: Normal.    ADRENAL GLANDS: Normal.    KIDNEYS/BLADDER: Obstructing 7 x 2 x 6 mm distal right ureteral stone located below the crossing of the iliac vessels. Associated mild to moderate right-sided hydroureteronephrosis with urothelial thickening and hyperenhancement as well as surrounding   edema. Normal left kidney and collapsed urinary bladder.    BOWEL: Normal caliber small bowel. Normal appendix. Prominent submucosal fat in the cecum may reflect remote colitis. No free air or free fluid.    LYMPH NODES: Normal.    VASCULATURE: Unremarkable.    PELVIC ORGANS: Left uterine fundal 1.0 cm nodular enhancing lesion, likely a fibroid. 2.2 cm dominant right ovarian follicle. Small amount of pelvic free fluid is likely physiologic. Vaginal tampon.    MUSCULOSKELETAL: Normal.      Impression    IMPRESSION:   1.  Obstructing 7 mm distal right ureteral stone located below the crossing of the iliac vessels. Associated mild right-sided hydronephrosis and moderate hydroureter.  2.  Right-sided ureteral and pelvic wall thickening/hyperenhancement which may reflect inflammation or superimposed upper urinary tract infection. Recommend laboratory correlation.   Comprehensive metabolic panel     Status: Abnormal   Result Value Ref Range    Sodium 140 136 - 145 mmol/L    Potassium 4.2 3.5 - 5.0 mmol/L    Chloride 107 98 - 107 mmol/L    Carbon Dioxide (CO2) 20 (L) 22 - 31 mmol/L    Anion Gap 13 5 - 18 mmol/L    Urea Nitrogen 10 8 - 22 mg/dL    Creatinine 0.92 0.60 - 1.10 mg/dL    Calcium 9.4 8.5 - 10.5 mg/dL    Glucose 127 (H) 70 - 125 mg/dL    Alkaline Phosphatase 71 45 - 120 U/L    AST 11 0 - 40 U/L    ALT 13 0 - 45 U/L    Protein Total 6.9 6.0 - 8.0 g/dL    Albumin 4.0 3.5 - 5.0 g/dL    Bilirubin Total 0.6 0.0 - 1.0 mg/dL    GFR Estimate 80 >60 mL/min/1.73m2   UA with Microscopic reflex to Culture      Status: Abnormal    Specimen: Urine, Midstream   Result Value Ref Range    Color Urine Yellow Colorless, Straw, Light Yellow, Yellow    Appearance Urine Cloudy (A) Clear    Glucose Urine Negative Negative mg/dL    Bilirubin Urine Negative Negative    Ketones Urine 60  (A) Negative mg/dL    Specific Gravity Urine 1.012 1.001 - 1.030    Blood Urine 0.2 mg/dL (A) Negative    pH Urine 6.0 5.0 - 7.0    Protein Albumin Urine 50  (A) Negative mg/dL    Urobilinogen Urine <2.0 <2.0 mg/dL    Nitrite Urine Negative Negative    Leukocyte Esterase Urine 500 Isaac/uL (A) Negative    Bacteria Urine Moderate (A) None Seen /HPF    WBC Clumps Urine Present (A) None Seen /HPF    Mucus Urine Present (A) None Seen /LPF    RBC Urine 18 (H) <=2 /HPF    WBC Urine >182 (H) <=5 /HPF    Squamous Epithelials Urine 3 (H) <=1 /HPF    Narrative    Urine Culture ordered based on laboratory criteria   HCG qualitative urine     Status: Normal   Result Value Ref Range    hCG Urine Qualitative Negative Negative   CBC with platelets and differential     Status: Abnormal   Result Value Ref Range    WBC Count 17.8 (H) 4.0 - 11.0 10e3/uL    RBC Count 3.89 3.80 - 5.20 10e6/uL    Hemoglobin 11.2 (L) 11.7 - 15.7 g/dL    Hematocrit 34.3 (L) 35.0 - 47.0 %    MCV 88 78 - 100 fL    MCH 28.8 26.5 - 33.0 pg    MCHC 32.7 31.5 - 36.5 g/dL    RDW 12.8 10.0 - 15.0 %    Platelet Count 310 150 - 450 10e3/uL    % Neutrophils 91 %    % Lymphocytes 2 %    % Monocytes 6 %    % Eosinophils 0 %    % Basophils 0 %    % Immature Granulocytes 1 %    NRBCs per 100 WBC 0 <1 /100    Absolute Neutrophils 16.3 (H) 1.6 - 8.3 10e3/uL    Absolute Lymphocytes 0.4 (L) 0.8 - 5.3 10e3/uL    Absolute Monocytes 1.0 0.0 - 1.3 10e3/uL    Absolute Eosinophils 0.0 0.0 - 0.7 10e3/uL    Absolute Basophils 0.0 0.0 - 0.2 10e3/uL    Absolute Immature Granulocytes 0.1 <=0.4 10e3/uL    Absolute NRBCs 0.0 10e3/uL   CRP inflammation     Status: Abnormal   Result Value Ref Range    CRP 2.9 (H) 0.0 - 0.8  mg/dL   Asymptomatic COVID-19 Virus (Coronavirus) by PCR Nasopharyngeal     Status: Normal    Specimen: Nasopharyngeal; Swab   Result Value Ref Range    SARS CoV2 PCR Negative Negative    Narrative    Testing was performed using the Xpert Xpress SARS-CoV-2 Assay on the   Cepheid Gene-Xpert Instrument Systems. Additional information about   this Emergency Use Authorization (EUA) assay can be found via the Lab   Guide. This test should be ordered for the detection of SARS-CoV-2 in   individuals who meet SARS-CoV-2 clinical and/or epidemiological   criteria. Test performance is unknown in asymptomatic patients. This   test is for in vitro diagnostic use under the FDA EUA for   laboratories certified under CLIA to perform high complexity testing.   This test has not been FDA cleared or approved. A negative result   does not rule out the presence of PCR inhibitors in the specimen or   target RNA in concentration below the limit of detection for the   assay. The possibility of a false negative should be considered if   the patient's recent exposure or clinical presentation suggests   COVID-19. This test was validated by the [facility]. This laboratory is certified under the Clinical Laboratory Improvement Amendments of 1988 (CLIA-88) as qualified to perform high complexity laboratory testing.     CBC with platelets differential     Status: Abnormal    Narrative    The following orders were created for panel order CBC with platelets differential.  Procedure                               Abnormality         Status                     ---------                               -----------         ------                     CBC with platelets and d...[[id number]]  Abnormal            Final result                 Please view results for these tests on the individual orders.   Results for orders placed or performed in visit on 05/24/22   UA macro with reflex to Microscopic and Culture - Clinc  Collect     Status: Abnormal    Specimen: Urine, Clean Catch   Result Value Ref Range    Color Urine Yellow Colorless, Straw, Light Yellow, Yellow    Appearance Urine Cloudy (A) Clear    Glucose Urine Negative Negative mg/dL    Bilirubin Urine Negative Negative    Ketones Urine Trace (A) Negative mg/dL    Specific Gravity Urine 1.010 1.005 - 1.030    Blood Urine Moderate (A) Negative    pH Urine 5.0 5.0 - 8.0    Protein Albumin Urine 100  (A) Negative mg/dL    Urobilinogen Urine 0.2 0.2, 1.0 E.U./dL    Nitrite Urine Negative Negative    Leukocyte Esterase Urine Large (A) Negative   Urine Microscopic Exam     Status: Abnormal   Result Value Ref Range    Bacteria Urine Moderate (A) None Seen /HPF    RBC Urine 5-10 (A) 0-2 /HPF /HPF    WBC Urine 25-50 (A) 0-5 /HPF /HPF    Squamous Epithelials Urine Few (A) None Seen /LPF    WBC Clumps Urine Present (A) None Seen /HPF     At the end of the encounter, I discussed results, diagnosis, medications. Discussed red flags for immediate return to clinic/ER, as well as indications for follow up if no improvement. Patient understood and agreed to plan. Patient was stable for discharge.

## 2022-05-24 NOTE — ED PROVIDER NOTES
EMERGENCY DEPARTMENT ENCOUNTER      NAME: Yeimy Blackmon  AGE: 42 year old female  YOB: 1980  MRN: [id number]  EVALUATION DATE & TIME: 5/24/2022  4:54 PM    [facility]    ED PROVIDER: Cheko Zhou M.D.      Chief Complaint   Patient presents with     UTI     Chills     Back Pain         IMPRESSION  1. Urinary tract infection with hematuria, site unspecified    2. Ureteral stone    3. Leukocytosis, unspecified type        PLAN  - transfer to [facility] for further care    ED COURSE & MEDICAL DECISION MAKING    42yoF with no significant past medical history presenting with UTI & obstructed ureteral stone. Does not meet sepsis criteria. Has had 2 days of dysuria, chills, flank pain. Pregnancy test negative. UA with clear UTI with bacteria, WBCs, leuk esterase. CRP 2.9 with WBC 17; suspect UTI in play---given Rocephin. CT with 7mm distal right ureteral stone with obstruction & inflammation suggestive of upper urinary tract infection; labs with no TRAE. Warrants admission and Urology consultation for possible intervention. No beds here at [facility] so transfer elsewhere sought; [facility] accepted patient in transfer. Urologist at [facility] did not feel UA represented a UTI as it was nitrite negative; not planning emergent intervention at this time. Regardless, warrants further IV antibiotics and await culture results; may require stone intervention still this hospitalization. Patient &  agreeable with transfer to [facility]; decline EMS transfer and prefer private auto. Patient understood and agreed with the plan; no further questions at the time of transfer.    --------------------------------------------------------------------------------   --------------------------------------------------------------------------------     5:08 PM I met with the patient for the initial interview and physical examination. Discussed plan for treatment and workup in the ED.  5:39 PM I spoke with Dr. Ann,  LUCY, who requests we call back when it is determined whether patient stays at [facility] or transfers to [facility].   7:09 PM I spoke with Dr. Jj Barros, urology at [facility]. Given nitrites are negative, he does not think that acute intervention is indicated at this time.   7:30 PM I spoke with Dr. Maxime Eastman, hospitalist at [facility], who accepts patient for transfer.  7:32 PM I rechecked and updated the patient on plan.      This patient involved a high degree of complexity in medical decision making, as significant risks were present and assessed.    Broad differential considered for this patient presenting, including but not limited to:  UTI, ureteral stone, sepsis, TRAE, electrolyte derangement, pregnancy issue, appendicitis, SBO, perforation, electrolyte derangement, dehydration    I wore the following PPE during this patient encounter:  N95 mask, face shield w/ eye protection    MEDICATIONS GIVEN IN THE EMERGENCY DEPARTMENT  Medications   ondansetron (ZOFRAN ODT) ODT tab 4 mg (4 mg Oral Given 5/24/22 1523)   iopamidol (ISOVUE-370) solution 100 mL (100 mLs Intravenous Given 5/24/22 1618)   0.9% sodium chloride BOLUS (0 mLs Intravenous Stopped 5/24/22 1945)   cefTRIAXone (ROCEPHIN) 2 g vial to attach to  ml bag for ADULTS or NS 50 ml bag for PEDS (0 g Intravenous Stopped 5/24/22 1945)   ondansetron (ZOFRAN) injection 4 mg (4 mg Intravenous Given 5/24/22 1954)   ketorolac (TORADOL) injection 15 mg (15 mg Intravenous Given 5/24/22 1955)       =================================================================      HPI  Patient information was obtained from: patient     Use of : N/A         Yeimy Blackmon is a 42 year old female with no pertinent medical history who presents to this ED by private car with  for evaluation of abdominal pain.     Per chart review, patient was seen at [facility] on 5/24/2022 for evaluation of abdominal pain, flank pain, dysuria. UA with signs of  urinary tract infection. Urine culture pending. Sent to ED for further evaluation.     Patient presents with lower abdominal pain and right flank pain that woke her from sleep last night at 12am. She took ibuprofen and was able to fall back asleep. This morning, she woke up with ongoing pain, that has persisted throughout the day. Patient did have some mild abdominal pain yesterday which resolved with ibuprofen. She thought it may be menstrual cramping. She also developed dysuria yesterday and started taking cranberrry pills. Today while at the clinic she had onset of chills, nausea, and vomiting. No history of kidney stones.     REVIEW OF SYSTEMS   Review of Systems   Constitutional: Positive for chills.   Gastrointestinal: Positive for abdominal pain (lower), nausea and vomiting.   Genitourinary: Positive for dysuria and flank pain (right).     All other systems reviewed and are negative except as noted above in HPI.        --------------- MEDICAL HISTORY ---------------  PAST MEDICAL HISTORY:  Past Medical History:   Diagnosis Date     De Quervain's syndrome (tenosynovitis) 09/2019    bilateral     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     low egg count, took 5 years to get pregnant with her first     Migraine      Normal delivery 11/2016     Normal delivery 02/03/2019     Patient Active Problem List   Diagnosis     Migraine Headache       PAST SURGICAL HISTORY:  Past Surgical History:   Procedure Laterality Date     OTHER SURGICAL HISTORY      none       CURRENT MEDICATIONS:    No current facility-administered medications for this encounter.    Current Outpatient Medications:      ibuprofen (ADVIL/MOTRIN) 200 MG tablet, Take 800 mg by mouth every 8 hours as needed for mild pain, Disp: , Rfl:      SUMAtriptan (IMITREX) 50 MG tablet, Take 1 tablet (50 mg) by mouth once as needed for migraine, Disp: 30 tablet, Rfl: 3    ALLERGIES:  No Known Allergies    FAMILY HISTORY:  Family History   Problem Relation Age of Onset      Rheumatoid Arthritis Mother      No Known Problems Father      Cancer Maternal Uncle         bladder     Rheumatoid Arthritis Maternal Uncle      Heart Disease Maternal Grandmother      Brain Cancer Maternal Grandmother      Early Death Maternal Grandfather         unknown cause     Rheumatoid Arthritis Maternal Uncle        SOCIAL HISTORY:   Social History     Socioeconomic History     Marital status:      Number of children: 2   Tobacco Use     Smoking status: Never Smoker     Smokeless tobacco: Never Used   Substance and Sexual Activity     Alcohol use: No     Comment: Alcoholic Drinks/day: 1-2 times a year     Drug use: No     Sexual activity: Yes     Partners: Male     Birth control/protection: Surgical     Comment: vasectomy       --------------- PHYSICAL EXAM ---------------  Nursing notes and vitals reviewed by me.  VITALS:  Vitals:    05/24/22 1732 05/24/22 1758 05/24/22 1800 05/24/22 2000   BP: 125/58 125/55 118/57 118/70   Pulse:   78    Resp:   16    Temp:   99.8  F (37.7  C)    TempSrc:   Oral    SpO2:   100%    Weight:           PHYSICAL EXAM:    General:  alert, interactive, no distress  Eyes:  conjunctivae clear, conjugate gaze  HENT:  atraumatic, nose with no rhinorrhea, oropharynx clear  Neck:  no meningismus  Cardiovascular:  HR 90s during exam, regular rhythm, no murmurs, brisk cap refill  Chest:  no chest wall tenderness  Pulmonary:  no stridor, normal phonation, normal work of breathing, clear lungs bilaterally  Abdomen:  soft, nondistended, nontender  :  Mild right CVA tenderness  Back:  no midline spinal tenderness  Musculoskeletal:  no pretibial edema, no calf tenderness. Gross ROM intact to joints of extremities with no obvious deformities.  Skin:  warm, dry, no rash  Neuro:  awake, alert, answers questions appropriately, follows commands, moves all limbs  Psych:  calm, normal affect      --------------- RESULTS ---------------  LAB:  Reviewed and interpreted by me.  Results  for orders placed or performed during the hospital encounter of 05/24/22   CT Abdomen Pelvis w Contrast    Impression    IMPRESSION:   1.  Obstructing 7 mm distal right ureteral stone located below the crossing of the iliac vessels. Associated mild right-sided hydronephrosis and moderate hydroureter.  2.  Right-sided ureteral and pelvic wall thickening/hyperenhancement which may reflect inflammation or superimposed upper urinary tract infection. Recommend laboratory correlation.   Comprehensive metabolic panel   Result Value Ref Range    Sodium 140 136 - 145 mmol/L    Potassium 4.2 3.5 - 5.0 mmol/L    Chloride 107 98 - 107 mmol/L    Carbon Dioxide (CO2) 20 (L) 22 - 31 mmol/L    Anion Gap 13 5 - 18 mmol/L    Urea Nitrogen 10 8 - 22 mg/dL    Creatinine 0.92 0.60 - 1.10 mg/dL    Calcium 9.4 8.5 - 10.5 mg/dL    Glucose 127 (H) 70 - 125 mg/dL    Alkaline Phosphatase 71 45 - 120 U/L    AST 11 0 - 40 U/L    ALT 13 0 - 45 U/L    Protein Total 6.9 6.0 - 8.0 g/dL    Albumin 4.0 3.5 - 5.0 g/dL    Bilirubin Total 0.6 0.0 - 1.0 mg/dL    GFR Estimate 80 >60 mL/min/1.73m2   UA with Microscopic reflex to Culture    Specimen: Urine, Midstream   Result Value Ref Range    Color Urine Yellow Colorless, Straw, Light Yellow, Yellow    Appearance Urine Cloudy (A) Clear    Glucose Urine Negative Negative mg/dL    Bilirubin Urine Negative Negative    Ketones Urine 60  (A) Negative mg/dL    Specific Gravity Urine 1.012 1.001 - 1.030    Blood Urine 0.2 mg/dL (A) Negative    pH Urine 6.0 5.0 - 7.0    Protein Albumin Urine 50  (A) Negative mg/dL    Urobilinogen Urine <2.0 <2.0 mg/dL    Nitrite Urine Negative Negative    Leukocyte Esterase Urine 500 Isaac/uL (A) Negative    Bacteria Urine Moderate (A) None Seen /HPF    WBC Clumps Urine Present (A) None Seen /HPF    Mucus Urine Present (A) None Seen /LPF    RBC Urine 18 (H) <=2 /HPF    WBC Urine >182 (H) <=5 /HPF    Squamous Epithelials Urine 3 (H) <=1 /HPF   HCG qualitative urine   Result Value  Ref Range    hCG Urine Qualitative Negative Negative   CBC with platelets and differential   Result Value Ref Range    WBC Count 17.8 (H) 4.0 - 11.0 10e3/uL    RBC Count 3.89 3.80 - 5.20 10e6/uL    Hemoglobin 11.2 (L) 11.7 - 15.7 g/dL    Hematocrit 34.3 (L) 35.0 - 47.0 %    MCV 88 78 - 100 fL    MCH 28.8 26.5 - 33.0 pg    MCHC 32.7 31.5 - 36.5 g/dL    RDW 12.8 10.0 - 15.0 %    Platelet Count 310 150 - 450 10e3/uL    % Neutrophils 91 %    % Lymphocytes 2 %    % Monocytes 6 %    % Eosinophils 0 %    % Basophils 0 %    % Immature Granulocytes 1 %    NRBCs per 100 WBC 0 <1 /100    Absolute Neutrophils 16.3 (H) 1.6 - 8.3 10e3/uL    Absolute Lymphocytes 0.4 (L) 0.8 - 5.3 10e3/uL    Absolute Monocytes 1.0 0.0 - 1.3 10e3/uL    Absolute Eosinophils 0.0 0.0 - 0.7 10e3/uL    Absolute Basophils 0.0 0.0 - 0.2 10e3/uL    Absolute Immature Granulocytes 0.1 <=0.4 10e3/uL    Absolute NRBCs 0.0 10e3/uL   CRP inflammation   Result Value Ref Range    CRP 2.9 (H) 0.0 - 0.8 mg/dL   Asymptomatic COVID-19 Virus (Coronavirus) by PCR Nasopharyngeal    Specimen: Nasopharyngeal; Swab   Result Value Ref Range    SARS CoV2 PCR Negative Negative       RADIOLOGY:  Reviewed by me. Please see official radiology report.  Recent Results (from the past 24 hour(s))   CT Abdomen Pelvis w Contrast    Narrative    EXAM: CT ABDOMEN PELVIS W CONTRAST  LOCATION: [facility]  DATE/TIME: 5/24/2022 4:14 PM    INDICATION: Urinary tract infection, back pain  COMPARISON: None.  TECHNIQUE: CT scan of the abdomen and pelvis was performed following injection of IV contrast. Multiplanar reformats were obtained. Dose reduction techniques were used.  CONTRAST: ISOVUE 370 100 mL    FINDINGS:   LOWER CHEST: Normal.    HEPATOBILIARY: Prominent gallbladder likely related to recent fasting. No radiodense gallstone, gallbladder wall thickening or surrounding edema. Periportal edema with associated dilatation of the IVC which can be seen with sequelae  of IV hydration. No   biliary ductal dilatation.    PANCREAS: Normal.    SPLEEN: Normal.    ADRENAL GLANDS: Normal.    KIDNEYS/BLADDER: Obstructing 7 x 2 x 6 mm distal right ureteral stone located below the crossing of the iliac vessels. Associated mild to moderate right-sided hydroureteronephrosis with urothelial thickening and hyperenhancement as well as surrounding   edema. Normal left kidney and collapsed urinary bladder.    BOWEL: Normal caliber small bowel. Normal appendix. Prominent submucosal fat in the cecum may reflect remote colitis. No free air or free fluid.    LYMPH NODES: Normal.    VASCULATURE: Unremarkable.    PELVIC ORGANS: Left uterine fundal 1.0 cm nodular enhancing lesion, likely a fibroid. 2.2 cm dominant right ovarian follicle. Small amount of pelvic free fluid is likely physiologic. Vaginal tampon.    MUSCULOSKELETAL: Normal.      Impression    IMPRESSION:   1.  Obstructing 7 mm distal right ureteral stone located below the crossing of the iliac vessels. Associated mild right-sided hydronephrosis and moderate hydroureter.  2.  Right-sided ureteral and pelvic wall thickening/hyperenhancement which may reflect inflammation or superimposed upper urinary tract infection. Recommend laboratory correlation.           I, Jing Couch, am serving as a scribe to document services personally performed by Dr. Cheko Zhou based on my observation and the provider's statements to me. I, Cheko Zhou MD attest that Jing Couch is acting in a scribe capacity, has observed my performance of the services and has documented them in accordance with my direction.      Cheko Zhou MD  05/24/22  Emergency Medicine  [facility] EMERGENCY ROOM  [address] [id number] [phone]  Dept: [phone]     Cheko Zhou MD  05/24/22 8212

## 2022-05-24 NOTE — PATIENT INSTRUCTIONS
Dear Yeimy Blackmon,    I am concerned about your areas of pain and feel you should be seen today.      We are sorry you are not feeling well. Based on the responses you provided, it is recommended that you be seen in-person in urgent care so we can better evaluate your symptoms. Please click here to find the nearest urgent care location to you.   You will not be charged for this Visit. Thank you for trusting us with your care.    Paul Rivera MD

## 2022-05-24 NOTE — ED TRIAGE NOTES
PT diagnosed with UTI at clinic. Sent here for further evaluation due to chills, back and abdominal pain, and vomiting     Triage Assessment     Row Name 05/24/22 3770       Triage Assessment (Adult)    Airway WDL WDL       Respiratory WDL    Respiratory WDL WDL       Skin Circulation/Temperature WDL    Skin Circulation/Temperature WDL WDL       Cardiac WDL    Cardiac WDL WDL       Peripheral/Neurovascular WDL    Peripheral Neurovascular WDL WDL       Cognitive/Neuro/Behavioral WDL    Cognitive/Neuro/Behavioral WDL WDL

## 2022-05-25 LAB — BACTERIA UR CULT: ABNORMAL

## 2022-05-28 LAB
BACTERIA UR CULT: ABNORMAL
BACTERIA UR CULT: ABNORMAL

## 2022-05-29 LAB
BACTERIA BLD CULT: NO GROWTH
BACTERIA BLD CULT: NO GROWTH

## 2022-05-31 PROBLEM — N20.0 KIDNEY STONE: Status: ACTIVE | Noted: 2022-05-31

## 2022-05-31 PROBLEM — N12 PYELONEPHRITIS: Status: ACTIVE | Noted: 2022-05-31

## 2022-06-01 ENCOUNTER — OFFICE VISIT (OUTPATIENT)
Dept: FAMILY MEDICINE | Facility: CLINIC | Age: 42
End: 2022-06-01
Payer: COMMERCIAL

## 2022-06-01 VITALS
BODY MASS INDEX: 19.64 KG/M2 | DIASTOLIC BLOOD PRESSURE: 64 MMHG | HEART RATE: 121 BPM | OXYGEN SATURATION: 100 % | WEIGHT: 136.9 LBS | SYSTOLIC BLOOD PRESSURE: 114 MMHG

## 2022-06-01 DIAGNOSIS — N20.0 KIDNEY STONE: ICD-10-CM

## 2022-06-01 DIAGNOSIS — N12 PYELONEPHRITIS: Primary | ICD-10-CM

## 2022-06-01 DIAGNOSIS — G43.009 MIGRAINE WITHOUT AURA AND WITHOUT STATUS MIGRAINOSUS, NOT INTRACTABLE: ICD-10-CM

## 2022-06-01 PROCEDURE — 99215 OFFICE O/P EST HI 40 MIN: CPT | Performed by: FAMILY MEDICINE

## 2022-06-01 RX ORDER — TAMSULOSIN HYDROCHLORIDE 0.4 MG/1
CAPSULE ORAL
COMMUNITY
Start: 2022-05-25 | End: 2022-07-27

## 2022-06-01 RX ORDER — CIPROFLOXACIN 500 MG/1
TABLET, FILM COATED ORAL
COMMUNITY
Start: 2022-05-26 | End: 2022-07-27

## 2022-06-01 ASSESSMENT — PAIN SCALES - GENERAL: PAINLEVEL: MODERATE PAIN (4)

## 2022-06-02 NOTE — PROGRESS NOTES
PROGRESS NOTE   6/1/2022    SUBJECTIVE:  Yeimy Blackmon is a 42 year old female who presents for     Chief Complaint   Patient presents with     Hospital F/U     Fatigue and some pain in abd and back.      Patient comes in today for follow-up after having been hospitalized from 5/24/2022 to 5/26/2022.  Patient was seen at urgent care that afternoon due to what she thought was a typical bladder infection.  She actually had sent an E visit in that morning asking for treatment for bladder infection.  She went to urgent care and as she was sitting there once the nurse had come in but before the doctor had come in and she started having chills and started vomiting.  She was evaluated in urgent care and then sent to the ER for further evaluation.  While she was in [facility] ER she had a CT which showed pyelonephritis with kidney stone that was obstructing as well.  [facility] did not have any beds and therefore patient was transferred to [facility] for admission.  She was admitted on 5/24/2022 to [facility].  She was evaluated there and found to have a 7 mm obstructing distal ureteral stone as well as mild hydronephrosis.  She underwent a cystoscopy and right retrograde pyelogram as well as laser lithotripsy of the stone on 5/25/2022.  She also had placement of a right double-J stent by Dr. Poon.  She was hospitalized involved 5/26/2022 when she began feeling better and was discharged home.  She is currently taking a 14-day course of ciprofloxacin.  She overall is feeling better.  She has been on Flomax as well.  She did see Dr. Poon in the hospital and then Dr. Lane.  She was supposed to schedule follow-up to have the stent removed in 11 to 14 days however she has been trying to get hold to [facility] for that and has been unsuccessful with getting anyone to get her an appointment before 23 June.  Patient notes that she overall is feeling better.  She is much better than she was the fevers and  chills etc. have gone away although she still has some aching in her abdominal area and in her flank area.  She is urinating normally.  She notes that over the weekend she did not have any pain at all and then today she started to have a little bit pain again.  She can function she can do what she needs to do.  She is back to work without any problems.  She is on the antibiotic until 6/9/2022.  She has not had to use any pain medication and she has not had to use any muscle relaxers in the last few days.  She did have a lot of migraine headaches while she was hospitalized as well.  This is a known issue for her.  I am sure the trigger of her being so ill was probably what caused all these migraine headaches.  They gave her Imitrex while she was hospitalized.  She notes that since she has been home she has not had any further migraine headaches at all.     Patient Active Problem List   Diagnosis     Migraine Headache     Pyelonephritis     Kidney stone       Current Outpatient Medications   Medication Sig Dispense Refill     ciprofloxacin (CIPRO) 500 MG tablet        tamsulosin (FLOMAX) 0.4 MG capsule        ibuprofen (ADVIL/MOTRIN) 200 MG tablet Take 800 mg by mouth every 8 hours as needed for mild pain       SUMAtriptan (IMITREX) 50 MG tablet Take 1 tablet (50 mg) by mouth once as needed for migraine 30 tablet 3       No Known Allergies    Past Medical History:   Diagnosis Date     De Quervain's syndrome (tenosynovitis) 09/01/2019    bilateral     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     low egg count, took 5 years to get pregnant with her first     Kidney stone 05/24/2022    dx with uti and 7 mm stone, stent placed at [facility]     Migraine      Normal delivery 11/01/2016     Normal delivery 02/03/2019       Past Surgical History:   Procedure Laterality Date     cystoscopy with right ureteral stent placement  05/25/2022    R 7 mm stone with uti at the time, hospitalized       History   Smoking Status      Never Smoker   Smokeless Tobacco     Never Used       OBJECTIVE:     /64   Pulse (!) 121   Wt 62.1 kg (136 lb 14.4 oz)   LMP 05/21/2022 (Exact Date)   SpO2 100%   Breastfeeding No   BMI 19.64 kg/m      Physical Exam:  GENERAL APPEARANCE: A&A, NAD, well hydrated, well nourished  SKIN:  Normal skin turgor, no lesions/rashes   HEENT: moist mucous membranes, no rhinorrhea, PERRLA, TM's clear bilaterally, Throat without significant erythema or exudate.  NECK: Supple, full ROM, no significant lymphadenopathy or thyromegaly  CV: RRR, no M/G/R   LUNGS: CTAB  ABDOMEN: S&NT, no masses or organomegaly, positive bowel sounds   EXTREMITY: no swelling noted.  Full range of motion of all 4 extremities.   NEURO: no gross deficits   PSYCHIATRIC;  Mood appropriate, memory intact    LABS:     Recent Results (from the past 240 hour(s))   UA macro with reflex to Microscopic and Culture - Clinc Collect    Collection Time: 05/24/22  1:35 PM    Specimen: Urine, Clean Catch   Result Value Ref Range    Color Urine Yellow Colorless, Straw, Light Yellow, Yellow    Appearance Urine Cloudy (A) Clear    Glucose Urine Negative Negative mg/dL    Bilirubin Urine Negative Negative    Ketones Urine Trace (A) Negative mg/dL    Specific Gravity Urine 1.010 1.005 - 1.030    Blood Urine Moderate (A) Negative    pH Urine 5.0 5.0 - 8.0    Protein Albumin Urine 100  (A) Negative mg/dL    Urobilinogen Urine 0.2 0.2, 1.0 E.U./dL    Nitrite Urine Negative Negative    Leukocyte Esterase Urine Large (A) Negative   Urine Microscopic Exam    Collection Time: 05/24/22  1:35 PM   Result Value Ref Range    Bacteria Urine Moderate (A) None Seen /HPF    RBC Urine 5-10 (A) 0-2 /HPF /HPF    WBC Urine 25-50 (A) 0-5 /HPF /HPF    Squamous Epithelials Urine Few (A) None Seen /LPF    WBC Clumps Urine Present (A) None Seen /HPF   Urine Culture    Collection Time: 05/24/22  1:35 PM    Specimen: Urine, Clean Catch   Result Value Ref Range    Culture >100,000 CFU/mL  Escherichia coli (A)        Susceptibility    Escherichia coli - ANDREW     Ampicillin <=2.0 Susceptible ug/mL     Ampicillin/ Sulbactam <=2.0 Susceptible ug/mL     Piperacillin/Tazobactam <=4.0 Susceptible ug/mL     Cefazolin* <=4.0 Susceptible ug/mL      * Cefazolin ANDREW breakpoints are for the treatment of uncomplicated urinary tract infections. For the treatment of systemic infections, please contact the laboratory for additional testing.     Cefoxitin <=4.0 Susceptible ug/mL     Ceftazidime <=1.0 Susceptible ug/mL     Ceftriaxone <=1.0 Susceptible ug/mL     Cefepime <=1.0 Susceptible ug/mL     Gentamicin <=1.0 Susceptible ug/mL     Tobramycin <=1.0 Susceptible ug/mL     Ciprofloxacin <=0.25 Susceptible ug/mL     Levofloxacin <=0.12 Susceptible ug/mL     Nitrofurantoin <=16.0 Susceptible ug/mL     Trimethoprim/Sulfamethoxazole <=1/19 Susceptible ug/mL   Comprehensive metabolic panel    Collection Time: 05/24/22  3:28 PM   Result Value Ref Range    Sodium 140 136 - 145 mmol/L    Potassium 4.2 3.5 - 5.0 mmol/L    Chloride 107 98 - 107 mmol/L    Carbon Dioxide (CO2) 20 (L) 22 - 31 mmol/L    Anion Gap 13 5 - 18 mmol/L    Urea Nitrogen 10 8 - 22 mg/dL    Creatinine 0.92 0.60 - 1.10 mg/dL    Calcium 9.4 8.5 - 10.5 mg/dL    Glucose 127 (H) 70 - 125 mg/dL    Alkaline Phosphatase 71 45 - 120 U/L    AST 11 0 - 40 U/L    ALT 13 0 - 45 U/L    Protein Total 6.9 6.0 - 8.0 g/dL    Albumin 4.0 3.5 - 5.0 g/dL    Bilirubin Total 0.6 0.0 - 1.0 mg/dL    GFR Estimate 80 >60 mL/min/1.73m2   CBC with platelets and differential    Collection Time: 05/24/22  3:28 PM   Result Value Ref Range    WBC Count 17.8 (H) 4.0 - 11.0 10e3/uL    RBC Count 3.89 3.80 - 5.20 10e6/uL    Hemoglobin 11.2 (L) 11.7 - 15.7 g/dL    Hematocrit 34.3 (L) 35.0 - 47.0 %    MCV 88 78 - 100 fL    MCH 28.8 26.5 - 33.0 pg    MCHC 32.7 31.5 - 36.5 g/dL    RDW 12.8 10.0 - 15.0 %    Platelet Count 310 150 - 450 10e3/uL    % Neutrophils 91 %    % Lymphocytes 2 %    %  Monocytes 6 %    % Eosinophils 0 %    % Basophils 0 %    % Immature Granulocytes 1 %    NRBCs per 100 WBC 0 <1 /100    Absolute Neutrophils 16.3 (H) 1.6 - 8.3 10e3/uL    Absolute Lymphocytes 0.4 (L) 0.8 - 5.3 10e3/uL    Absolute Monocytes 1.0 0.0 - 1.3 10e3/uL    Absolute Eosinophils 0.0 0.0 - 0.7 10e3/uL    Absolute Basophils 0.0 0.0 - 0.2 10e3/uL    Absolute Immature Granulocytes 0.1 <=0.4 10e3/uL    Absolute NRBCs 0.0 10e3/uL   CRP inflammation    Collection Time: 05/24/22  3:28 PM   Result Value Ref Range    CRP 2.9 (H) 0.0 - 0.8 mg/dL   UA with Microscopic reflex to Culture    Collection Time: 05/24/22  3:34 PM    Specimen: Urine, Midstream   Result Value Ref Range    Color Urine Yellow Colorless, Straw, Light Yellow, Yellow    Appearance Urine Cloudy (A) Clear    Glucose Urine Negative Negative mg/dL    Bilirubin Urine Negative Negative    Ketones Urine 60  (A) Negative mg/dL    Specific Gravity Urine 1.012 1.001 - 1.030    Blood Urine 0.2 mg/dL (A) Negative    pH Urine 6.0 5.0 - 7.0    Protein Albumin Urine 50  (A) Negative mg/dL    Urobilinogen Urine <2.0 <2.0 mg/dL    Nitrite Urine Negative Negative    Leukocyte Esterase Urine 500 Isaac/uL (A) Negative    Bacteria Urine Moderate (A) None Seen /HPF    WBC Clumps Urine Present (A) None Seen /HPF    Mucus Urine Present (A) None Seen /LPF    RBC Urine 18 (H) <=2 /HPF    WBC Urine >182 (H) <=5 /HPF    Squamous Epithelials Urine 3 (H) <=1 /HPF   HCG qualitative urine    Collection Time: 05/24/22  3:34 PM   Result Value Ref Range    hCG Urine Qualitative Negative Negative   Urine Culture    Collection Time: 05/24/22  3:34 PM    Specimen: Urine, Midstream   Result Value Ref Range    Culture >100,000 CFU/mL Escherichia coli (A)     Culture 10,000-50,000 CFU/mL Escherichia coli (A)        Susceptibility    Escherichia coli - ANDREW     Ampicillin <=2.0 Susceptible ug/mL     Ampicillin/ Sulbactam <=2.0 Susceptible ug/mL     Piperacillin/Tazobactam <=4.0 Susceptible ug/mL      Cefazolin* <=4.0 Susceptible ug/mL      * Cefazolin ANDREW breakpoints are for the treatment of uncomplicated urinary tract infections. For the treatment of systemic infections, please contact the laboratory for additional testing.     Cefoxitin <=4.0 Susceptible ug/mL     Ceftazidime <=1.0 Susceptible ug/mL     Ceftriaxone <=1.0 Susceptible ug/mL     Cefepime <=1.0 Susceptible ug/mL     Gentamicin <=1.0 Susceptible ug/mL     Tobramycin <=1.0 Susceptible ug/mL     Ciprofloxacin <=0.25 Susceptible ug/mL     Levofloxacin <=0.12 Susceptible ug/mL     Nitrofurantoin <=16.0 Susceptible ug/mL     Trimethoprim/Sulfamethoxazole <=1/19 Susceptible ug/mL   Asymptomatic COVID-19 Virus (Coronavirus) by PCR Nasopharyngeal    Collection Time: 05/24/22  5:32 PM    Specimen: Nasopharyngeal; Swab   Result Value Ref Range    SARS CoV2 PCR Negative Negative   Blood Culture Peripheral Blood    Collection Time: 05/24/22  5:42 PM    Specimen: Peripheral Blood   Result Value Ref Range    Culture No Growth    Blood Culture Peripheral Blood    Collection Time: 05/24/22  5:57 PM    Specimen: Peripheral Blood   Result Value Ref Range    Culture No Growth       EXAM: CT ABDOMEN PELVIS W CONTRAST  LOCATION: [facility] [place]  DATE/TIME: 5/24/2022 4:14 PM     INDICATION: Urinary tract infection, back pain  COMPARISON: None.  TECHNIQUE: CT scan of the abdomen and pelvis was performed following injection of IV contrast. Multiplanar reformats were obtained. Dose reduction techniques were used.  CONTRAST: ISOVUE 370 100 mL     FINDINGS:   LOWER CHEST: Normal.     HEPATOBILIARY: Prominent gallbladder likely related to recent fasting. No radiodense gallstone, gallbladder wall thickening or surrounding edema. Periportal edema with associated dilatation of the IVC which can be seen with sequelae of IV hydration. No   biliary ductal dilatation.     PANCREAS: Normal.     SPLEEN: Normal.     ADRENAL GLANDS: Normal.     KIDNEYS/BLADDER:  Obstructing 7 x 2 x 6 mm distal right ureteral stone located below the crossing of the iliac vessels. Associated mild to moderate right-sided hydroureteronephrosis with urothelial thickening and hyperenhancement as well as surrounding   edema. Normal left kidney and collapsed urinary bladder.     BOWEL: Normal caliber small bowel. Normal appendix. Prominent submucosal fat in the cecum may reflect remote colitis. No free air or free fluid.     LYMPH NODES: Normal.     VASCULATURE: Unremarkable.     PELVIC ORGANS: Left uterine fundal 1.0 cm nodular enhancing lesion, likely a fibroid. 2.2 cm dominant right ovarian follicle. Small amount of pelvic free fluid is likely physiologic. Vaginal tampon.     MUSCULOSKELETAL: Normal.                                                                      IMPRESSION:   1.  Obstructing 7 mm distal right ureteral stone located below the crossing of the iliac vessels. Associated mild right-sided hydronephrosis and moderate hydroureter.  2.  Right-sided ureteral and pelvic wall thickening/hyperenhancement which may reflect inflammation or superimposed upper urinary tract infection. Recommend laboratory correlation.       I personally reviewed the ER report from 5/24/2022 at [facility] and then also the H&P from 5/24/2022 at [facility] as well as the discharge summary from 5/26/2022 from her hospitalization at [facility].  I also reviewed CT report from 5/24/2022 as well as lab work as noted above from [facility] emergency room visit.     ASSESSMENT/PLAN:     Pyelonephritis    Kidney stone    Migraine without aura and without status migrainosus, not intractable    Patient presents today for hospitalization follow-up from a hospitalization that she had on 5/24/2022.  She presented to urgent care on that day with what she thought was a typical bladder infection which is not unusual for her.  She ended up being sent to the emergency room from the walk-in  care due to the fact that she was having fevers and chills and significant vomiting.  She was evaluated in [facility] emergency room and admission was felt to be necessary due to the fact that she had mild hydronephrosis as well as an obstructing stone and pyelonephritis.  There was not a bed available at [facility] so she was transferred to [facility].  She was mended to [facility] and did have subsequent surgical removal of the kidney stone with lithotripsy.  She also had a placement of a stent at that time.  The stent remains in place.  We talked about the fact that she seems to be much better.  She is not having excruciating back pain or abdominal pain like she was.  She continues to have a gnawing pain in her abdomen and flank area which I think is most likely from the stent.  We discussed that at length today.  She is not been running any fevers and overall feels like she is able to function and do what she needs to do she is just not 100% yet.  We talked about the fact that it probably is due to the fact that the stent is in place and she needs to have that removed.  She has had great difficulty getting hold of anyone at [facility] to get that appointment set up for follow-up and stent removal.  I did today go ahead and call [facility] because of the run around that patient has been getting and try to arrange for follow-up.  I did finally get hold of someone at [facility] and they will contact patient to get her in for an appointment within the next week or so.  Patient continues on Flomax which seems to be helping.  She also has Imitrex at home in case she needs it for her migraine headaches.  She overall feels like things are gradually improving.  She did do a little bit too much over the weekend and felt that yesterday but overall she feels like things are doing much better than they were.  She has never had a history of kidney stones in the past and so will need to  have follow-up for that with the urologist.  She was reminded that she is due for physical exam we will get that scheduled in the next couple of months.  She has not had to use any pain medication or muscle relaxants basically since she got home from the hospital which is great.  She will continue to monitor her symptoms carefully and will let me know if she has additional problems or concerns but overall it seems like things are improving.  We will see her back for physical exam as noted above.  45 minutes spent on the day of encounter doing chart review, history and exam, counseling, coordination of care, documentation and other activities as noted.   Tabatha Garrett MD    This note was dictated using voice recognition software. Any grammatical errors, context distortions, or spelling errors are not intentional

## 2022-06-07 ENCOUNTER — TRANSFERRED RECORDS (OUTPATIENT)
Dept: HEALTH INFORMATION MANAGEMENT | Facility: CLINIC | Age: 42
End: 2022-06-07
Payer: COMMERCIAL

## 2022-06-10 ENCOUNTER — MYC MEDICAL ADVICE (OUTPATIENT)
Dept: FAMILY MEDICINE | Facility: CLINIC | Age: 42
End: 2022-06-10
Payer: COMMERCIAL

## 2022-06-13 NOTE — TELEPHONE ENCOUNTER
Chief Complaint   Patient presents with     Insurance (IOD)     Medical message sent to reach out to hospital billing/ insurance company.  Gladis Antony RN on 6/13/2022 at 9:17 AM

## 2022-07-22 ENCOUNTER — MYC MEDICAL ADVICE (OUTPATIENT)
Dept: FAMILY MEDICINE | Facility: CLINIC | Age: 42
End: 2022-07-22

## 2022-07-27 ENCOUNTER — OFFICE VISIT (OUTPATIENT)
Dept: FAMILY MEDICINE | Facility: CLINIC | Age: 42
End: 2022-07-27
Payer: COMMERCIAL

## 2022-07-27 VITALS
WEIGHT: 138.4 LBS | BODY MASS INDEX: 19.81 KG/M2 | OXYGEN SATURATION: 99 % | DIASTOLIC BLOOD PRESSURE: 68 MMHG | HEART RATE: 105 BPM | HEIGHT: 70 IN | SYSTOLIC BLOOD PRESSURE: 108 MMHG

## 2022-07-27 DIAGNOSIS — M79.672 LEFT FOOT PAIN: ICD-10-CM

## 2022-07-27 DIAGNOSIS — N12 PYELONEPHRITIS: ICD-10-CM

## 2022-07-27 DIAGNOSIS — Z12.31 ENCOUNTER FOR SCREENING MAMMOGRAM FOR BREAST CANCER: ICD-10-CM

## 2022-07-27 DIAGNOSIS — N20.0 KIDNEY STONE: ICD-10-CM

## 2022-07-27 DIAGNOSIS — Z82.61 FAMILY HISTORY OF RHEUMATOID ARTHRITIS: ICD-10-CM

## 2022-07-27 DIAGNOSIS — L72.3 SEBACEOUS CYST: ICD-10-CM

## 2022-07-27 DIAGNOSIS — Z00.00 ROUTINE GENERAL MEDICAL EXAMINATION AT A HEALTH CARE FACILITY: Primary | ICD-10-CM

## 2022-07-27 DIAGNOSIS — N92.6 IRREGULAR MENSES: ICD-10-CM

## 2022-07-27 DIAGNOSIS — G43.009 MIGRAINE WITHOUT AURA AND WITHOUT STATUS MIGRAINOSUS, NOT INTRACTABLE: ICD-10-CM

## 2022-07-27 LAB
ALBUMIN SERPL BCG-MCNC: 5 G/DL (ref 3.5–5.2)
ALBUMIN UR-MCNC: NEGATIVE MG/DL
ALP SERPL-CCNC: 76 U/L (ref 35–104)
ALT SERPL W P-5'-P-CCNC: 15 U/L (ref 10–35)
ANION GAP SERPL CALCULATED.3IONS-SCNC: 12 MMOL/L (ref 7–15)
APPEARANCE UR: ABNORMAL
AST SERPL W P-5'-P-CCNC: 22 U/L (ref 10–35)
BACTERIA #/AREA URNS HPF: ABNORMAL /HPF
BILIRUB SERPL-MCNC: 0.2 MG/DL
BILIRUB UR QL STRIP: NEGATIVE
BUN SERPL-MCNC: 11.3 MG/DL (ref 6–20)
CALCIUM SERPL-MCNC: 9.8 MG/DL (ref 8.6–10)
CHLORIDE SERPL-SCNC: 102 MMOL/L (ref 98–107)
CHOLEST SERPL-MCNC: 184 MG/DL
COLOR UR AUTO: YELLOW
CREAT SERPL-MCNC: 0.91 MG/DL (ref 0.51–0.95)
DEPRECATED HCO3 PLAS-SCNC: 25 MMOL/L (ref 22–29)
ERYTHROCYTE [DISTWIDTH] IN BLOOD BY AUTOMATED COUNT: 14.1 % (ref 10–15)
GFR SERPL CREATININE-BSD FRML MDRD: 81 ML/MIN/1.73M2
GLUCOSE SERPL-MCNC: 98 MG/DL (ref 70–99)
GLUCOSE UR STRIP-MCNC: NEGATIVE MG/DL
HCT VFR BLD AUTO: 41.4 % (ref 35–47)
HDLC SERPL-MCNC: 69 MG/DL
HGB BLD-MCNC: 13.2 G/DL (ref 11.7–15.7)
HGB UR QL STRIP: ABNORMAL
KETONES UR STRIP-MCNC: NEGATIVE MG/DL
LDLC SERPL CALC-MCNC: 95 MG/DL
LEUKOCYTE ESTERASE UR QL STRIP: NEGATIVE
MCH RBC QN AUTO: 28.6 PG (ref 26.5–33)
MCHC RBC AUTO-ENTMCNC: 31.9 G/DL (ref 31.5–36.5)
MCV RBC AUTO: 90 FL (ref 78–100)
NITRATE UR QL: NEGATIVE
NONHDLC SERPL-MCNC: 115 MG/DL
PH UR STRIP: 7 [PH] (ref 5–8)
PLATELET # BLD AUTO: 318 10E3/UL (ref 150–450)
POTASSIUM SERPL-SCNC: 4.5 MMOL/L (ref 3.4–5.3)
PROT SERPL-MCNC: 7.8 G/DL (ref 6.4–8.3)
RBC # BLD AUTO: 4.61 10E6/UL (ref 3.8–5.2)
RBC #/AREA URNS AUTO: ABNORMAL /HPF
SODIUM SERPL-SCNC: 139 MMOL/L (ref 136–145)
SP GR UR STRIP: 1.01 (ref 1–1.03)
SQUAMOUS #/AREA URNS AUTO: ABNORMAL /LPF
TRIGL SERPL-MCNC: 102 MG/DL
UROBILINOGEN UR STRIP-ACNC: 0.2 E.U./DL
WBC # BLD AUTO: 6.6 10E3/UL (ref 4–11)
WBC #/AREA URNS AUTO: ABNORMAL /HPF

## 2022-07-27 PROCEDURE — 87086 URINE CULTURE/COLONY COUNT: CPT | Performed by: FAMILY MEDICINE

## 2022-07-27 PROCEDURE — 85027 COMPLETE CBC AUTOMATED: CPT | Performed by: FAMILY MEDICINE

## 2022-07-27 PROCEDURE — 80061 LIPID PANEL: CPT | Performed by: FAMILY MEDICINE

## 2022-07-27 PROCEDURE — 80053 COMPREHEN METABOLIC PANEL: CPT | Performed by: FAMILY MEDICINE

## 2022-07-27 PROCEDURE — 36415 COLL VENOUS BLD VENIPUNCTURE: CPT | Performed by: FAMILY MEDICINE

## 2022-07-27 PROCEDURE — 86431 RHEUMATOID FACTOR QUANT: CPT | Performed by: FAMILY MEDICINE

## 2022-07-27 PROCEDURE — 81001 URINALYSIS AUTO W/SCOPE: CPT | Performed by: FAMILY MEDICINE

## 2022-07-27 PROCEDURE — 99214 OFFICE O/P EST MOD 30 MIN: CPT | Mod: 25 | Performed by: FAMILY MEDICINE

## 2022-07-27 PROCEDURE — 99396 PREV VISIT EST AGE 40-64: CPT | Performed by: FAMILY MEDICINE

## 2022-07-27 RX ORDER — LEVONORGESTREL AND ETHINYL ESTRADIOL 0.15-0.03
1 KIT ORAL DAILY
Qty: 84 TABLET | Refills: 4 | Status: SHIPPED | OUTPATIENT
Start: 2022-07-27 | End: 2023-08-14

## 2022-07-27 ASSESSMENT — ENCOUNTER SYMPTOMS
NAUSEA: 0
PARESTHESIAS: 0
PALPITATIONS: 0
BREAST MASS: 0
WEAKNESS: 0
HEADACHES: 1
CONSTIPATION: 0
COUGH: 0
HEMATOCHEZIA: 0
HEARTBURN: 0
JOINT SWELLING: 0
FEVER: 0
SHORTNESS OF BREATH: 0
FREQUENCY: 0
SORE THROAT: 0
CHILLS: 0
DIARRHEA: 0
HEMATURIA: 0
EYE PAIN: 0
DIZZINESS: 0
DYSURIA: 0
MYALGIAS: 0
ARTHRALGIAS: 0
NERVOUS/ANXIOUS: 0
ABDOMINAL PAIN: 0

## 2022-07-27 ASSESSMENT — PAIN SCALES - GENERAL: PAINLEVEL: NO PAIN (0)

## 2022-07-27 NOTE — PROGRESS NOTES
SUBJECTIVE:   CC: Yeimy Blackmon is an 42 year old woman who presents for preventive health visit.       Patient has been advised of split billing requirements and indicates understanding: Yes  Healthy Habits:     Getting at least 3 servings of Calcium per day:  Yes    Bi-annual eye exam:  Yes    Dental care twice a year:  Yes    Sleep apnea or symptoms of sleep apnea:  None    Diet:  Regular (no restrictions)    Frequency of exercise:  1 day/week    Duration of exercise:  15-30 minutes    Taking medications regularly:  Yes    Medication side effects:  Not applicable    PHQ-2 Total Score: 0    Additional concerns today:  No    Patient comes in today for physical exam.  She is overall feeling well.  She did have an episode of pyelonephritis and was hospitalized at the end of May for a infection.  She ended up having a stent placed for kidney stones.  He was hospitalized for 3 days and had the stent removed in the middle part of .  Since she has had the stent out she is feeling great.  She was on Cipro as well as Flomax during the process of her kidney stone and pyelonephritis but that seems to be much better.  The whole episode rather scared her because she thought she just had a plain old bladder infection and it sounded like it was already going into sepsis so that was concerning to her.  She notes that her uncle just recently  of sepsis from a urinary source and so that really was quite disturbing to her.  She is glad that everything is good and she is feeling great now.  She does not need an official follow-up with the urologist or the kidney stone doctors unless she has more difficulties.  Her last Pap smear was 3/1/2021 and was normal so will not be repeated today.  She does have a history of migraine headaches which are under fairly good control.  She did have a lot of migraine headaches when she was hospitalized and had a pyelonephritis but now with the senior much better controlled.  She uses  Imitrex as needed and they pretty much come with her cycle but otherwise seem to be fairly well controlled.  She Clines hepatitis C testing.  She declines hepatitis A and hepatitis B vaccination today.  She does note that she has had some irregularity to her menstrual cycles.  In October her cycles became a little bit more irregular.  They come early sometimes they come late sometimes and in November she did not have any cycle at all.  She had hot flashes all month in November but never to get bleeding.  She since December so has been getting bleeding more on a regular basis again and has not had the episodes of hot flashes but she is wondering if there is something else that can be done or if she can be put on birth control pills to try to regulate her cycles a little bit and help to normalize her cycles.  She also has a lump on her head that is been there for a couple of years.  It seems to be getting better now when she puts on her motorcycle helmet it puts pressure on it and causes her pain and so she is wondering how she goes about getting that removed.  She also has a lump on the left great toe which has been there for quite some time but now it seems like it is bothering her when she tries to move her foot. She feels some resistance and is wondering what she can do about that as well.  Mom and maternal uncles and some cousins have rheumatoid arthritis and so she would like to have a rheumatoid factor drawn today to make sure that she is not developing that as well.  She is due for mammogram was given the phone number to call to schedule that.     Today's PHQ-2 Score:   PHQ-2 ( 1999 Pfizer) 7/27/2022   Q1: Little interest or pleasure in doing things 0   Q2: Feeling down, depressed or hopeless 0   PHQ-2 Score 0   Q1: Little interest or pleasure in doing things Not at all   Q2: Feeling down, depressed or hopeless Not at all   PHQ-2 Score 0       Abuse: Current or Past (Physical, Sexual or Emotional) - No  Do you  feel safe in your environment? Yes    Have you ever done Advance Care Planning? (For example, a Health Directive, POLST, or a discussion with a medical provider or your loved ones about your wishes): No, advance care planning information given to patient to review.  Patient declined advance care planning discussion at this time.    Social History     Tobacco Use     Smoking status: Never Smoker     Smokeless tobacco: Never Used   Substance Use Topics     Alcohol use: No     Comment: Alcoholic Drinks/day: 1-2 times a year     If you drink alcohol do you typically have >3 drinks per day or >7 drinks per week? No    Alcohol Use 7/27/2022   Prescreen: >3 drinks/day or >7 drinks/week? No   Prescreen: >3 drinks/day or >7 drinks/week? -       Reviewed orders with patient.  Reviewed health maintenance and updated orders accordingly - Yes      Breast Cancer Screening:    Breast CA Risk Assessment (FHS-7) 7/27/2022   Do you have a family history of breast, colon, or ovarian cancer? No / Unknown       Mammogram Screening - Offered annual screening and updated Health Maintenance for mutual plan based on risk factor consideration    Pertinent mammograms are reviewed under the imaging tab.    History of abnormal Pap smear: NO - age 30- 65 PAP every 3 years recommended  PAP / HPV Latest Ref Rng & Units 3/1/2021 3/26/2019 8/3/2018   PAP Negative for squamous intraepithelial lesion or malignancy. Negative for squamous intraepithelial lesion or malignancy  Electronically signed by Riri Pa CT (ASCP) on 3/9/2021 at  4:44 PM   Negative for squamous intraepithelial lesion or malignancy  Electronically signed by Margarita Gore CT (ASCP) on 4/3/2019 at  8:58 AM   Negative for squamous intraepithelial lesion or malignancy  Electronically signed by Margarita Gore CT (ASCP) on 8/10/2018 at  7:33 AM     HPV16 NEG Negative Negative Negative   HPV18 NEG Negative Negative Negative   HRHPV NEG Negative Negative Negative      Reviewed and updated as needed this visit by clinical staff   Tobacco  Allergies  Meds   Med Hx  Surg Hx  Fam Hx  Soc Hx          Reviewed and updated as needed this visit by Provider   Tobacco  Allergies  Meds   Med Hx  Surg Hx  Fam Hx  Soc Hx           Current Outpatient Medications:      levonorgestrel-ethinyl estradiol (SEASONALE) 0.15-0.03 MG tablet, Take 1 tablet by mouth daily, Disp: 84 tablet, Rfl: 4     ibuprofen (ADVIL/MOTRIN) 200 MG tablet, Take 800 mg by mouth every 8 hours as needed for mild pain, Disp: , Rfl:      SUMAtriptan (IMITREX) 50 MG tablet, Take 1 tablet (50 mg) by mouth once as needed for migraine, Disp: 30 tablet, Rfl: 3     No Known Allergies     Past Medical History:   Diagnosis Date     De Quervain's syndrome (tenosynovitis) 09/01/2019    bilateral     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     low egg count, took 5 years to get pregnant with her first     Kidney stone 05/24/2022    dx with uti and 7 mm stone, stent [facility] at [facility]     Migraine      Normal delivery 11/01/2016     Normal delivery 02/03/2019        Past Surgical History:   Procedure Laterality Date     cystoscopy with right ureteral stent placement  05/25/2022    R 7 mm stone with uti at the time, hospitalized        Family History   Problem Relation Age of Onset     Rheumatoid Arthritis Mother      No Known Problems Father      Cancer Maternal Uncle         bladder     Rheumatoid Arthritis Maternal Uncle      Heart Disease Maternal Grandmother      Brain Cancer Maternal Grandmother      Early Death Maternal Grandfather         unknown cause     Rheumatoid Arthritis Maternal Uncle         Social History     Socioeconomic History     Marital status:      Spouse name: Aristeo     Number of children: 2     Years of education: Not on file     Highest education level: Not on file   Occupational History     Occupation:      Comment: [employer]   Tobacco Use     Smoking status: Never  Smoker     Smokeless tobacco: Never Used   Vaping Use     Vaping Use: Never used   Substance and Sexual Activity     Alcohol use: No     Comment: Alcoholic Drinks/day: 1-2 times a year     Drug use: No     Sexual activity: Yes     Partners: Male     Birth control/protection: Surgical     Comment: vasectomy   Other Topics Concern     Not on file   Social History Narrative     Not on file     Social Determinants of Health     Financial Resource Strain: Not on file   Food Insecurity: Not on file   Transportation Needs: Not on file   Physical Activity: Not on file   Stress: Not on file   Social Connections: Not on file   Intimate Partner Violence: Not on file   Housing Stability: Not on file        Immunization History   Administered Date(s) Administered     Influenza Vaccine IM > 6 months Valent IIV4 (Alfuria,Fluzone) 2016, 10/29/2018     Tdap (Adacel,Boostrix) 2007, 2016, 2018        OB History    Para Term  AB Living   2 2 2 0 0 2   SAB IAB Ectopic Multiple Live Births   0 0 0 0 2      # Outcome Date GA Lbr Eugene/2nd Weight Sex Delivery Anes PTL Lv   2 Term 19 38w1d 05:15 / 00:14 2.58 kg (5 lb 11 oz) F Vag-Spont EPI N JAI      Name: Rita      Apgar1: 6  Apgar5: 8   1 Term 16 39w2d 10:10 / 00:48 3.232 kg (7 lb 2 oz) M Vag-Spont EPI N JAI      Birth Comments: neg GBS, spont labor, pit augmentation after epidural, progressed nicely, pushed 48 minutes, Right nuchal hand and arm and elbow at birth, large vaginal tear      Name: Topher      Apgar1: 8  Apgar5: 9          Review of Systems   Constitutional: Negative for chills and fever.   HENT: Negative for congestion, ear pain, hearing loss and sore throat.    Eyes: Negative for pain and visual disturbance.   Respiratory: Negative for cough and shortness of breath.    Cardiovascular: Negative for chest pain, palpitations and peripheral edema.   Gastrointestinal: Negative for abdominal pain, constipation, diarrhea,  "heartburn, hematochezia and nausea.   Breasts:  Negative for tenderness, breast mass and discharge.   Genitourinary: Negative for dysuria, frequency, genital sores, hematuria, pelvic pain, urgency, vaginal bleeding and vaginal discharge.   Musculoskeletal: Negative for arthralgias, joint swelling and myalgias.   Skin: Negative for rash.   Neurological: Positive for headaches. Negative for dizziness, weakness and paresthesias.   Psychiatric/Behavioral: Negative for mood changes. The patient is not nervous/anxious.         OBJECTIVE:   /68   Pulse 105   Ht 1.778 m (5' 10\")   Wt 62.8 kg (138 lb 6.4 oz)   LMP 07/22/2022 (Exact Date)   SpO2 99%   Breastfeeding No   BMI 19.86 kg/m    Physical Exam  REVIEW OF SYSTEMS:   Denies fever, chills, visual changes, fatigue, myalgias, nasal congestion, rhinorrhea, ear pain or discharge, sore throat, swollen glands, breast mass, nipple discharge, breast changes, abdominal pain, nausea, vomiting, diarrhea, constipation, cough, shortness of breath, chest pain, weight change, change in bowel habits, melena, rectal bleeding, dysuria, frequency, urgency, hematuria, polyuria, polydipsia, polyphagia, joint pain or swelling or erythema, edema, rash, weakness, paresthesias, vaginal discharge or bleeding or mood changes other than that mentioned above in HPI.   Remainder of review of systems was negative.      PHYSICAL EXAM:  On exam, patient is a WD, WN 42 year old female in [facility].  /68   Pulse 105   Ht 1.778 m (5' 10\")   Wt 62.8 kg (138 lb 6.4 oz)   LMP 07/22/2022 (Exact Date)   SpO2 99%   Breastfeeding No   BMI 19.86 kg/m    Head normocephalic, atraumatic.  Patient does have a 1 cm sebaceous cyst located in the center region of the frontal scalp about 3 cm in from the hairline.  It is nontender to palpation does not appear to be inflamed anyway.  Eyes PERRL, ears TM s clear bilaterally.  Throat without significant erythemia or exudate.  Neck was supple, full range " of motion. No significant lymphadenopathy or thyromegaly was appreciated.  Lungs clear to auscultation  Heart regular rate and rhythm.  Breast exam was done. No masses were appreciated. Axilla were clear bilaterally. No nipple discharge was appreciated.   Abdomen was soft, nontender, nondistended. No masses or organomegaly were palpated. Positive bowel sounds were appreciated.  Extremities with full range of motion of all 4 extremities were noted.  Deep tendon reflexes were equal and symmetrical. Motor and sensation were intact to both the upper and lower extremities.  Cranial nerves 2 through 12 were grossly intact.  EOM were intact.  On exam of her left great toe she has a very small little bump probably about 3 mm in diameter on the lateral side of the left great toe which is nontender to palpation but does cause some resistance when she curls her toes.  Pelvic exam was done.  External genitalia appeared normal. Bimanual exam revealed uterus to be normal size and adenexa without palpable masses.   A&O x3, thought processes congruent, non-tangential. No hallucinations/delusions. Insight/judgment: intact. Denies suicidal/homicidal ideations.    LABS:    Recent Results (from the past 240 hour(s))   CBC with platelets    Collection Time: 07/27/22  3:15 PM   Result Value Ref Range    WBC Count 6.6 4.0 - 11.0 10e3/uL    RBC Count 4.61 3.80 - 5.20 10e6/uL    Hemoglobin 13.2 11.7 - 15.7 g/dL    Hematocrit 41.4 35.0 - 47.0 %    MCV 90 78 - 100 fL    MCH 28.6 26.5 - 33.0 pg    MCHC 31.9 31.5 - 36.5 g/dL    RDW 14.1 10.0 - 15.0 %    Platelet Count 318 150 - 450 10e3/uL   Lipid panel reflex to direct LDL Fasting    Collection Time: 07/27/22  3:15 PM   Result Value Ref Range    Cholesterol 184 <200 mg/dL    Triglycerides 102 <150 mg/dL    Direct Measure HDL 69 >=50 mg/dL    LDL Cholesterol Calculated 95 <=100 mg/dL    Non HDL Cholesterol 115 <130 mg/dL   Comprehensive metabolic panel    Collection Time: 07/27/22  3:15 PM    Result Value Ref Range    Sodium 139 136 - 145 mmol/L    Potassium 4.5 3.4 - 5.3 mmol/L    Creatinine 0.91 0.51 - 0.95 mg/dL    Urea Nitrogen 11.3 6.0 - 20.0 mg/dL    Chloride 102 98 - 107 mmol/L    Carbon Dioxide (CO2) 25 22 - 29 mmol/L    Anion Gap 12 7 - 15 mmol/L    Glucose 98 70 - 99 mg/dL    Calcium 9.8 8.6 - 10.0 mg/dL    Protein Total 7.8 6.4 - 8.3 g/dL    Albumin 5.0 3.5 - 5.2 g/dL    Bilirubin Total 0.2 <=1.2 mg/dL    Alkaline Phosphatase 76 35 - 104 U/L    AST 22 10 - 35 U/L    ALT 15 10 - 35 U/L    GFR Estimate 81 >60 mL/min/1.73m2   Rheumatoid factor    Collection Time: 07/27/22  3:15 PM   Result Value Ref Range    Rheumatoid Factor 8 <12 IU/mL   UA reflex to Microscopic    Collection Time: 07/27/22  3:17 PM   Result Value Ref Range    Color Urine Yellow Colorless, Straw, Light Yellow, Yellow    Appearance Urine Slightly Cloudy (A) Clear    Glucose Urine Negative Negative mg/dL    Bilirubin Urine Negative Negative    Ketones Urine Negative Negative mg/dL    Specific Gravity Urine 1.015 1.005 - 1.030    Blood Urine Large (A) Negative    pH Urine 7.0 5.0 - 8.0    Protein Albumin Urine Negative Negative mg/dL    Urobilinogen Urine 0.2 0.2, 1.0 E.U./dL    Nitrite Urine Negative Negative    Leukocyte Esterase Urine Negative Negative   Urine Microscopic Exam    Collection Time: 07/27/22  3:17 PM   Result Value Ref Range    Bacteria Urine Few (A) None Seen /HPF    RBC Urine  (A) 0-2 /HPF /HPF    WBC Urine 0-5 0-5 /HPF /HPF    Squamous Epithelials Urine Moderate (A) None Seen /LPF         ASSESSMENT/PLAN:   ASSESSMENT: 42 year old female physical exam.     PLAN:     Routine general medical examination at a health care facility [Z00.00]    1. Routine general medical examination at a health care facility  - CBC with platelets; Future  - Lipid panel reflex to direct LDL Fasting; Future  - UA reflex to Microscopic; Future  - CBC with platelets  - Lipid panel reflex to direct LDL Fasting  - UA reflex to  Microscopic  - Urine Microscopic Exam    2. Migraine without aura and without status migrainosus, not intractable  - Comprehensive metabolic panel; Future  - Comprehensive metabolic panel    3. Irregular menses  - levonorgestrel-ethinyl estradiol (SEASONALE) 0.15-0.03 MG tablet; Take 1 tablet by mouth daily  Dispense: 84 tablet; Refill: 4    4. Pyelonephritis  - UA reflex to Microscopic; Future  - Urine Culture; Future  - Comprehensive metabolic panel; Future  - UA reflex to Microscopic  - Urine Culture  - Comprehensive metabolic panel  - Urine Microscopic Exam    5. Kidney stone  - UA reflex to Microscopic; Future  - Urine Culture; Future  - Comprehensive metabolic panel; Future  - UA reflex to Microscopic  - Urine Culture  - Comprehensive metabolic panel  - Urine Microscopic Exam    6. Left foot pain  - Orthopedic  Referral; Future    7. Sebaceous cyst    8. Family history of rheumatoid arthritis  - Rheumatoid factor; Future  - Rheumatoid factor    9. Encounter for screening mammogram for breast cancer  - *MA Screening Digital Bilateral; Future      Patient is a 42 year old female who is overall doing well.  She has a history of bladder infections but had pyelonephritis and kidney stone in the end of May.  She had a stent placed and was in a lot of pain last time I saw her for follow-up from that hospitalization.  She had the stent removed and she is doing well now.  We will go ahead and recheck urinalysis today as well as urine culture and metabolic panel to follow-up on the kidney stone as well as pyelonephritis.  She is feeling great and does not need any follow-up with the urologist unless there is further problems.  She does have a history of migraine headaches.  The migraine headaches were much worse when she was in the middle of having a pyelonephritis in the hospitalization when she was so ill but now this seems to be much better.  She gets them before she gets her cycles or with her cycles but  otherwise does not seem to need the Imitrex any other time of the month.  She overall feels like things are relatively stable in terms of the migraine headaches.  We will go ahead and draw metabolic panel to follow-up on Imitrex use.  She also notes that her cycles are now getting more irregular.  They seem to come early sometimes come late sometimes and there was a month where she did not have any at all.  She is also had a significant number of hot flashes.  She is wondering if starting on birth control pills might be helpful for her.  We talked about the pros and cons of that.  Birth control pills are certainly a good way to get through menopause and I think that would be a good idea.  She would really like to try the Seasonale or the 3-month birth control pills that she does not have as many cycles and we certainly can give that a try.  We talked about the fact that the biggest side effect with the 3-month pills is that she gets more irregular menstrual cycles and more irregular bleeding but we certainly can give the Seasonale a try.  Prescription was written today for that.  She will let me know how things go.  She does need a mammogram and that was ordered today.  She does have some pain in her left great toe and she does have a little lump there.  I do think she be best to be evaluated by the podiatrist.  She feels resistance when she curls her toes or when she walks in certain shoes.  She does take dance classes and has definitely noticed it in that regard.  Will refer to podiatry and see what they can do to help her.  She does have a classic sebaceous cyst at the top of her head that she would like to have removed.  She will set up an appointment in the future to have that done.  Her mom and several of her uncles have rheumatoid arthritis and so she would like to have rheumatoid factor drawn today which was done.  He declined hepatitis C testing.  Last Pap smear was 3/1/2021 and was not repeated today.  She  "declines hepatitis C and hepatitis B vaccination.  She declines COVID vaccination.  All of her questions and concerns were addressed today.  If she has additional problems or concerns should let me know.    Will contact her with the results of the labs when available.    Voice recognition software was used in the creation of this note. Any grammatical or nonsensical errors are due to inherent errors with the software and are not the intention of the writer.      Tabatha Garrett MD      Patient has been advised of split billing requirements and indicates understanding: Yes    COUNSELING:  Reviewed preventive health counseling, as reflected in patient instructions       Regular exercise       Healthy diet/nutrition       Contraception       Consider Hep C screening for all patients one time for ages 18-79 years       (Marlee)menopause management    Estimated body mass index is 19.86 kg/m  as calculated from the following:    Height as of this encounter: 1.778 m (5' 10\").    Weight as of this encounter: 62.8 kg (138 lb 6.4 oz).    Weight management plan noted, stable and monitoring    She reports that she has never smoked. She has never used smokeless tobacco.      Counseling Resources:  ATP IV Guidelines  Pooled Cohorts Equation Calculator  Breast Cancer Risk Calculator  BRCA-Related Cancer Risk Assessment: FHS-7 Tool  FRAX Risk Assessment  ICSI Preventive Guidelines  Dietary Guidelines for Americans, 2010  USDA's MyPlate  ASA Prophylaxis  Lung CA Screening    Tabatha Garrett MD  [facility]  "

## 2022-07-28 ENCOUNTER — MYC MEDICAL ADVICE (OUTPATIENT)
Dept: FAMILY MEDICINE | Facility: CLINIC | Age: 42
End: 2022-07-28

## 2022-07-28 DIAGNOSIS — R82.90 ABNORMAL URINALYSIS: Primary | ICD-10-CM

## 2022-07-28 LAB — RHEUMATOID FACT SER NEPH-ACNC: 8 IU/ML

## 2022-07-28 NOTE — TELEPHONE ENCOUNTER
Next week too soon for f/u labs.   Asked patient through mychart to reschedule for 2nd wk of August.

## 2022-07-29 LAB — BACTERIA UR CULT: NORMAL

## 2022-08-16 ENCOUNTER — OFFICE VISIT (OUTPATIENT)
Dept: PODIATRY | Facility: CLINIC | Age: 42
End: 2022-08-16
Attending: FAMILY MEDICINE
Payer: COMMERCIAL

## 2022-08-16 VITALS — BODY MASS INDEX: 19.8 KG/M2 | HEART RATE: 97 BPM | OXYGEN SATURATION: 99 % | WEIGHT: 138 LBS

## 2022-08-16 DIAGNOSIS — M79.89 MASS OF SOFT TISSUE OF FOOT: Primary | ICD-10-CM

## 2022-08-16 DIAGNOSIS — M76.61 ACHILLES TENDINITIS OF RIGHT LOWER EXTREMITY: ICD-10-CM

## 2022-08-16 PROCEDURE — 99203 OFFICE O/P NEW LOW 30 MIN: CPT | Performed by: PODIATRIST

## 2022-08-16 ASSESSMENT — PAIN SCALES - GENERAL: PAINLEVEL: NO PAIN (0)

## 2022-08-16 NOTE — LETTER
8/16/2022         RE: Yeimy Blackmon  [address] [address]        Dear Colleague,    Thank you for referring your patient, Yeimy Blackmon, to the [facility]. Please see a copy of my visit note below.          FOOT AND ANKLE SURGERY/PODIATRY CONSULT NOTE         ASSESSMENT:   Soft tissue mass left hallux   Insertional Achilles tendinitis right       TREATMENT:  Soft tissue mass left hallux: Small, firm soft tissue mass along the plantar lateral left hallux, just proximal to the IPJ. No pain with ROM of the left hallux IPJ or 1st MPJ.     -We discussed that the soft tissue mass appears firm, likely more related to a fibrous mass.     -I have referred her for x-rays and MRI of the left foot.     -I will contact the patient with the MRI report when available and we will be guided by the results.     Insertional Achilles Tendinitis right: We discussed that the dance class likely triggered her symptoms.     -She is asymptomatic at this time and will continue to monitor. Will consider heel lift with NSAID's if symptoms reoccur.    -Patient's questions invited and answered. She was encouraged to call my office with any further questions or concerns.     Tulio Gage DPM  [facility] Podiatry/Foot & Ankle Surgery      HPI: I was asked to see Yeimy Blackmon today for a soft tissue mass in her left hallux. She believes it has been present for several months and initially was much larger and more painful. She now has minimal discomfort and the overall size has decreased. Denies trauma. She also complains of pain in the right heel which started shortly after starting a dance class, and resolved in June of this year after the class finished.       Past Medical History:   Diagnosis Date     De Quervain's syndrome (tenosynovitis) 09/01/2019    bilateral     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     low egg count, took 5 years to get pregnant with her first      Kidney stone 05/24/2022    dx with uti and 7 mm stone, stent placed at [facility]      Normal delivery 11/01/2016     Normal delivery 02/03/2019         Social History     Socioeconomic History     Marital status:      Spouse name: Aristeo     Number of children: 2     Years of education: Not on file     Highest education level: Not on file   Occupational History     Occupation:      Comment: [employer]   Tobacco Use     Smoking status: Never Smoker     Smokeless tobacco: Never Used   Vaping Use     Vaping Use: Never used   Substance and Sexual Activity     Alcohol use: No     Comment: Alcoholic Drinks/day: 1-2 times a year     Drug use: No     Sexual activity: Yes     Partners: Male     Birth control/protection: Surgical     Comment: vasectomy   Other Topics Concern     Not on file   Social History Narrative     Not on file     Social Determinants of Health     Financial Resource Strain: Not on file   Food Insecurity: Not on file   Transportation Needs: Not on file   Physical Activity: Not on file   Stress: Not on file   Social Connections: Not on file   Intimate Partner Violence: Not on file   Housing Stability: Not on file          No Known Allergies      MEDICATIONS:   Current Outpatient Medications   Medication     ibuprofen (ADVIL/MOTRIN) 200 MG tablet     levonorgestrel-ethinyl estradiol (SEASONALE) 0.15-0.03 MG tablet     SUMAtriptan (IMITREX) 50 MG tablet     No current facility-administered medications for this visit.        Family History   Problem Relation Age of Onset     Rheumatoid Arthritis Mother      No Known Problems Father      Cancer Maternal Uncle         bladder     Rheumatoid Arthritis Maternal Uncle      Heart Disease Maternal Grandmother      Brain Cancer Maternal Grandmother      Early Death Maternal Grandfather         unknown cause     Rheumatoid Arthritis Maternal Uncle           Review of Systems - 10 point Review of Systems is negative  except for foot pain which is noted in HPI.    OBJECTIVE:  Appearance: alert, well appearing, and in no distress.    VITAL SIGNS: Pulse 97   Wt 62.6 kg (138 lb)   LMP 07/22/2022 (Exact Date)   SpO2 99%   BMI 19.80 kg/m        General appearance: Patient is alert and fully cooperative with history & exam.  No sign of distress is noted during the visit.     Psychiatric: Affect is pleasant & appropriate.  Patient appears motivated to improve health.     Respiratory: Breathing is regular & unlabored while sitting.     HEENT: Hearing is intact to spoken word.  Speech is clear.  No gross evidence of visual impairment that would impact ambulation.      Vascular: Dorsalis pedis and posterior tibial pulses are palpable. There is pedal hair growth bilateral.  CFT < 3 sec from anterior tibial surface to distal digits bilateral. There is no appreciable edema noted.  Dermatologic: Turgor and texture are within normal limits. No coloration or temperature changes. No primary or secondary lesions noted.  Neurologic: All epicritic and proprioceptive sensations are grossly intact bilateral.  Musculoskeletal: Small, firm soft tissue mass along the plantar lateral left hallux, just proximal to the IPJ. No pain with ROM of the left hallux IPJ or 1st MPJ. No pain along the achilles tendon or plantar fascia right foot.           Again, thank you for allowing me to participate in the care of your patient.        Sincerely,        Tulio Gage DPM

## 2022-08-16 NOTE — PROGRESS NOTES
FOOT AND ANKLE SURGERY/PODIATRY CONSULT NOTE         ASSESSMENT:   Soft tissue mass left hallux   Insertional Achilles tendinitis right       TREATMENT:  Soft tissue mass left hallux: Small, firm soft tissue mass along the plantar lateral left hallux, just proximal to the IPJ. No pain with ROM of the left hallux IPJ or 1st MPJ.     -We discussed that the soft tissue mass appears firm, likely more related to a fibrous mass.     -I have referred her for x-rays and MRI of the left foot.     -I will contact the patient with the MRI report when available and we will be guided by the results.     Insertional Achilles Tendinitis right: We discussed that the dance class likely triggered her symptoms.     -She is asymptomatic at this time and will continue to monitor. Will consider heel lift with NSAID's if symptoms reoccur.    -Patient's questions invited and answered. She was encouraged to call my office with any further questions or concerns.     Tulio Gage DPM  [facility] Podiatry/Foot & Ankle Surgery      HPI: I was asked to see Yeimy Blackmon today for a soft tissue mass in her left hallux. She believes it has been present for several months and initially was much larger and more painful. She now has minimal discomfort and the overall size has decreased. Denies trauma. She also complains of pain in the right heel which started shortly after starting a dance class, and resolved in June of this year after the class finished.       Past Medical History:   Diagnosis Date     De Quervain's syndrome (tenosynovitis) 09/01/2019    bilateral     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     low egg count, took 5 years to get pregnant with her first     Kidney stone 05/24/2022    dx with uti and 7 mm stone, stent placed at [facility]     Migraine      Normal delivery 11/01/2016     Normal delivery 02/03/2019         Social History     Socioeconomic History     Marital status:      Spouse name: Aristeo      Number of children: 2     Years of education: Not on file     Highest education level: Not on file   Occupational History     Occupation:      Comment: [employer]   Tobacco Use     Smoking status: Never Smoker     Smokeless tobacco: Never Used   Vaping Use     Vaping Use: Never used   Substance and Sexual Activity     Alcohol use: No     Comment: Alcoholic Drinks/day: 1-2 times a year     Drug use: No     Sexual activity: Yes     Partners: Male     Birth control/protection: Surgical     Comment: vasectomy   Other Topics Concern     Not on file   Social History Narrative     Not on file     Social Determinants of Health     Financial Resource Strain: Not on file   Food Insecurity: Not on file   Transportation Needs: Not on file   Physical Activity: Not on file   Stress: Not on file   Social Connections: Not on file   Intimate Partner Violence: Not on file   Housing Stability: Not on file          No Known Allergies      MEDICATIONS:   Current Outpatient Medications   Medication     ibuprofen (ADVIL/MOTRIN) 200 MG tablet     levonorgestrel-ethinyl estradiol (SEASONALE) 0.15-0.03 MG tablet     SUMAtriptan (IMITREX) 50 MG tablet     No current facility-administered medications for this visit.        Family History   Problem Relation Age of Onset     Rheumatoid Arthritis Mother      No Known Problems Father      Cancer Maternal Uncle         bladder     Rheumatoid Arthritis Maternal Uncle      Heart Disease Maternal Grandmother      Brain Cancer Maternal Grandmother      Early Death Maternal Grandfather         unknown cause     Rheumatoid Arthritis Maternal Uncle           Review of Systems - 10 point Review of Systems is negative except for foot pain which is noted in HPI.    OBJECTIVE:  Appearance: alert, well appearing, and in no distress.    VITAL SIGNS: Pulse 97   Wt 62.6 kg (138 lb)   LMP 07/22/2022 (Exact Date)   SpO2 99%   BMI 19.80 kg/m        General appearance: Patient is alert and  fully cooperative with history & exam.  No sign of distress is noted during the visit.     Psychiatric: Affect is pleasant & appropriate.  Patient appears motivated to improve health.     Respiratory: Breathing is regular & unlabored while sitting.     HEENT: Hearing is intact to spoken word.  Speech is clear.  No gross evidence of visual impairment that would impact ambulation.      Vascular: Dorsalis pedis and posterior tibial pulses are palpable. There is pedal hair growth bilateral.  CFT < 3 sec from anterior tibial surface to distal digits bilateral. There is no appreciable edema noted.  Dermatologic: Turgor and texture are within normal limits. No coloration or temperature changes. No primary or secondary lesions noted.  Neurologic: All epicritic and proprioceptive sensations are grossly intact bilateral.  Musculoskeletal: Small, firm soft tissue mass along the plantar lateral left hallux, just proximal to the IPJ. No pain with ROM of the left hallux IPJ or 1st MPJ. No pain along the achilles tendon or plantar fascia right foot.

## 2022-09-01 ENCOUNTER — HOSPITAL ENCOUNTER (OUTPATIENT)
Dept: MAMMOGRAPHY | Facility: CLINIC | Age: 42
Discharge: HOME | End: 2022-09-01
Attending: FAMILY MEDICINE | Admitting: FAMILY MEDICINE
Payer: COMMERCIAL

## 2022-09-01 DIAGNOSIS — Z12.31 ENCOUNTER FOR SCREENING MAMMOGRAM FOR BREAST CANCER: ICD-10-CM

## 2022-09-01 PROCEDURE — 77067 SCR MAMMO BI INCL CAD: CPT

## 2022-09-02 ENCOUNTER — HOSPITAL ENCOUNTER (OUTPATIENT)
Dept: MRI IMAGING | Facility: CLINIC | Age: 42
Discharge: HOME | End: 2022-09-02
Attending: PODIATRIST | Admitting: PODIATRIST
Payer: COMMERCIAL

## 2022-09-02 DIAGNOSIS — M79.89 MASS OF SOFT TISSUE OF FOOT: ICD-10-CM

## 2022-09-02 PROCEDURE — A9585 GADOBUTROL INJECTION: HCPCS | Performed by: PODIATRIST

## 2022-09-02 PROCEDURE — 73720 MRI LWR EXTREMITY W/O&W/DYE: CPT | Mod: LT

## 2022-09-02 PROCEDURE — 255N000002 HC RX 255 OP 636: Performed by: PODIATRIST

## 2022-09-02 RX ORDER — GADOBUTROL 604.72 MG/ML
6 INJECTION INTRAVENOUS ONCE
Status: COMPLETED | OUTPATIENT
Start: 2022-09-02 | End: 2022-09-02

## 2022-09-02 RX ADMIN — GADOBUTROL 6 ML: 604.72 INJECTION INTRAVENOUS at 20:37

## 2022-09-06 ENCOUNTER — TELEPHONE (OUTPATIENT)
Dept: VASCULAR SURGERY | Facility: CLINIC | Age: 42
End: 2022-09-06

## 2022-09-06 DIAGNOSIS — M76.60 ACHILLES TENDINITIS, UNSPECIFIED LATERALITY: Primary | ICD-10-CM

## 2022-09-06 RX ORDER — PREDNISONE 10 MG/1
TABLET ORAL
Qty: 30 TABLET | Refills: 0 | Status: SHIPPED | OUTPATIENT
Start: 2022-09-06 | End: 2022-10-07

## 2022-09-06 NOTE — TELEPHONE ENCOUNTER
I reviewed the patient's left foot MRI with her today: 1.  No soft tissue mass.  2.  Mild benign hypertrophic changes at the medial and lateral margin of the first distal phalanx base.  3.  Linear incomplete signal in the first metatarsal distal metaphysis. Differential considerations include a prominent penetrating vessel or old-subacute incomplete fracture. Clinical correlation with point tenderness is recommended.  4.  No ligament or tendon tear.    Patient reports she is having intermittent mild pain in the left hallux. We discussed NSAID's vs oral steroid. I will start her on tapered course or prednisone. She will limit high impact activities and return to see me as symptoms dictate.   
stated

## 2022-09-18 ENCOUNTER — HEALTH MAINTENANCE LETTER (OUTPATIENT)
Age: 42
End: 2022-09-18

## 2022-09-23 ENCOUNTER — MYC MEDICAL ADVICE (OUTPATIENT)
Dept: FAMILY MEDICINE | Facility: CLINIC | Age: 42
End: 2022-09-23

## 2022-09-23 DIAGNOSIS — G43.809 OTHER MIGRAINE WITHOUT STATUS MIGRAINOSUS, NOT INTRACTABLE: ICD-10-CM

## 2022-09-23 RX ORDER — SUMATRIPTAN 50 MG/1
50 TABLET, FILM COATED ORAL
Qty: 30 TABLET | Refills: 0 | Status: SHIPPED | OUTPATIENT
Start: 2022-09-23 | End: 2023-02-06

## 2022-10-07 ENCOUNTER — OFFICE VISIT (OUTPATIENT)
Dept: FAMILY MEDICINE | Facility: CLINIC | Age: 42
End: 2022-10-07
Payer: COMMERCIAL

## 2022-10-07 VITALS
SYSTOLIC BLOOD PRESSURE: 108 MMHG | HEART RATE: 102 BPM | DIASTOLIC BLOOD PRESSURE: 66 MMHG | WEIGHT: 141.8 LBS | OXYGEN SATURATION: 100 % | BODY MASS INDEX: 20.35 KG/M2

## 2022-10-07 DIAGNOSIS — L72.3 SEBACEOUS CYST: Primary | ICD-10-CM

## 2022-10-07 DIAGNOSIS — Z53.9 DIAGNOSIS NOT YET DEFINED: ICD-10-CM

## 2022-10-07 PROCEDURE — 11422 EXC H-F-NK-SP B9+MARG 1.1-2: CPT | Performed by: FAMILY MEDICINE

## 2022-10-07 ASSESSMENT — PAIN SCALES - GENERAL: PAINLEVEL: NO PAIN (0)

## 2022-10-07 NOTE — PROGRESS NOTES
SUBJECTIVE:   Yeimy Blackmon is a 42 year old female who presents today for cyst excision. She has 1 cyst that she would like removed. It has been changing since it is gradually getting bigger. It becomes irritated by brushing her hair and getting her hair cut. She denies any pain in the area.       OBJECTIVE:   Exam shows a 1.1 cm bump on the crown of the head about 3 cm back from the center hairline. It appears to be a classic sebaceous cyst. It is nontender to palpation and is showing no signs of infection.  It is 1.1cm in diameter.      Discussed with Yeimy regarding technique, risk of infection, and scarring. Betadine is used for cleansing. Lidocaine with epinephrine is used for anesthesia, total of 3 ml was used.  Incision was made across the scalp directly over the sebaceous cyst and using blunt dissection the cyst was dissected free from the surrounding skin and tissue.  Cyst was then removed without problems or complications.  The cyst did slightly rupture doing the removal however I was able to remove the entire wall of the cyst without problems or complications.  3 sutures of 3-0 ethilon are placed. Yeimy tolerated procedure well. No complications.    ASSESSMENT:   Sebaceous cyst excision from crown of the head.     PLAN  Wound care instructions given.  She should try to keep the area dry for the next 48 hours and then after that she can run water over it but should not soak the area until the sutures are removed.  Return in 10-14 days for suture removal.  He declined flu and COVID vaccination today.  Patient did call earlier in the week stating that she was having regular bleeding since starting on her birth control.  She is through 1 month of the Seasonale pack.  She started about a month ago and has had some irregular bleeding.  We will see how this next month goes and if she continues to have irregular bleeding then she certainly should let me know.  She is on Seasonale we did discuss the fact that  this is probably the most common side effect of Seasonale and so that may be part of this but if she continues to have the irregular bleeding after this next pill pack she certainly should let me know.  Tabatha Garrett MD  10/7/2022  1:31 PM

## 2022-10-19 ENCOUNTER — OFFICE VISIT (OUTPATIENT)
Dept: FAMILY MEDICINE | Facility: CLINIC | Age: 42
End: 2022-10-19
Payer: COMMERCIAL

## 2022-10-19 VITALS
HEART RATE: 92 BPM | RESPIRATION RATE: 16 BRPM | BODY MASS INDEX: 20.19 KG/M2 | HEIGHT: 70 IN | SYSTOLIC BLOOD PRESSURE: 110 MMHG | WEIGHT: 141 LBS | DIASTOLIC BLOOD PRESSURE: 72 MMHG | OXYGEN SATURATION: 99 % | TEMPERATURE: 97.8 F

## 2022-10-19 DIAGNOSIS — Z48.02 ENCOUNTER FOR REMOVAL OF SUTURES: Primary | ICD-10-CM

## 2022-10-19 PROCEDURE — 99207 PR NO CHARGE NURSE ONLY: CPT | Performed by: FAMILY MEDICINE

## 2022-10-19 ASSESSMENT — PAIN SCALES - GENERAL: PAINLEVEL: NO PAIN (0)

## 2022-10-19 NOTE — PROGRESS NOTES
PROGRESS NOTE   10/19/2022    SUBJECTIVE:  Yeimy Blackmon is a 42 year old female who presents for     Chief Complaint   Patient presents with     Suture Removal     3 suture removal on head      Patient comes in today to have sutures removed from her head.  She had a sebaceous cyst removed on 10/7/2022.  #3 sutures were placed at that time.  She comes in today for removal of these.  They seem to be doing fine.  She has had no problems or complications after her sebaceous cyst removal.  There has been no evidence for drainage or other evidence for infection.  She did hit her head and exactly that spots a few days after the sebaceous cyst removal which was painful but did not cause any bleeding or any other secondary complications.  They are starting to get a little bit itchy now and she is happy to be getting them out.    Patient Active Problem List   Diagnosis     Migraine Headache     Pyelonephritis     Kidney stone     Mass of soft tissue of foot     Achilles tendinitis of right lower extremity       Current Outpatient Medications   Medication Sig Dispense Refill     ibuprofen (ADVIL/MOTRIN) 200 MG tablet Take 800 mg by mouth every 8 hours as needed for mild pain       levonorgestrel-ethinyl estradiol (SEASONALE) 0.15-0.03 MG tablet Take 1 tablet by mouth daily 84 tablet 4     SUMAtriptan (IMITREX) 50 MG tablet Take 1 tablet (50 mg) by mouth once as needed for migraine 30 tablet 0       No Known Allergies    Past Medical History:   Diagnosis Date     De Quervain's syndrome (tenosynovitis) 09/01/2019    bilateral     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     low egg count, took 5 years to get pregnant with her first     Kidney stone 05/24/2022    dx with uti and 7 mm stone, stent placed at [facility]     Migraine      Normal delivery 11/01/2016     Normal delivery 02/03/2019       Past Surgical History:   Procedure Laterality Date     cystoscopy with right ureteral stent placement  05/25/2022    R 7 mm  "stone with uti at the time, hospitalized       History   Smoking Status     Never   Smokeless Tobacco     Never       OBJECTIVE:     /72   Pulse 92   Temp 97.8  F (36.6  C)   Resp 16   Ht 1.778 m (5' 10\")   Wt 64 kg (141 lb)   LMP 09/11/2022 (Exact Date)   SpO2 99%   Breastfeeding No   BMI 20.23 kg/m      Physical Exam:  GENERAL APPEARANCE: A&A, NAD, well hydrated, well nourished  SKIN:  Normal skin turgor, no lesions/rashes   On exam of the crown of her head she has a well-healed incision without problems or complications.  There is no evidence for any infection or drainage at all.  The incision seems to have healed well.  #3 sutures were removed without problem or complication.  Patient tolerated the procedure well.  EXTREMITY: no swelling noted.  Full range of motion of all 4 extremities.   NEURO: no gross deficits   PSYCHIATRIC;  Mood appropriate, memory intact        ASSESSMENT/PLAN:     Encounter for removal of sutures    Patient comes in today for removal of 3 sutures placed in the incision after a sebaceous cyst removal on the top of her head.  It was in the crown of her head and was done on 10/7/2022.  She is had no problems or complications with these.  Sutures were easily removed today.  No evidence for any secondary infection is present.  I did caution her to be a little bit careful when she washes her hair etc. for the next week or so so that she does not rub extensively in that area but otherwise I think that should be fine and shows no signs of developing any complications.  If she has additional problems or concerns will let me know.  She declined hepatitis B COVID and flu vaccination today.  Tabatha Garrett MD    This note was dictated using voice recognition software. Any grammatical errors, context distortions, or spelling errors are not intentional     Suture Removal    History of Present Illness       Reason for visit:  Remove sutures    She eats 0-1 servings of fruits and " vegetables daily.She consumes 2 sweetened beverage(s) daily.She exercises with enough effort to increase her heart rate 10 to 19 minutes per day.  She exercises with enough effort to increase her heart rate 3 or less days per week.   She is taking medications regularly.

## 2022-12-16 ENCOUNTER — TRANSFERRED RECORDS (OUTPATIENT)
Dept: HEALTH INFORMATION MANAGEMENT | Facility: CLINIC | Age: 42
End: 2022-12-16

## 2022-12-28 ENCOUNTER — TRANSFERRED RECORDS (OUTPATIENT)
Dept: HEALTH INFORMATION MANAGEMENT | Facility: CLINIC | Age: 42
End: 2022-12-28

## 2023-01-11 ENCOUNTER — TRANSFERRED RECORDS (OUTPATIENT)
Dept: HEALTH INFORMATION MANAGEMENT | Facility: CLINIC | Age: 43
End: 2023-01-11

## 2023-02-14 ENCOUNTER — OFFICE VISIT (OUTPATIENT)
Dept: FAMILY MEDICINE | Facility: CLINIC | Age: 43
End: 2023-02-14
Payer: COMMERCIAL

## 2023-02-14 VITALS
RESPIRATION RATE: 18 BRPM | TEMPERATURE: 98.8 F | WEIGHT: 148 LBS | BODY MASS INDEX: 21.24 KG/M2 | OXYGEN SATURATION: 99 % | SYSTOLIC BLOOD PRESSURE: 137 MMHG | HEART RATE: 100 BPM | DIASTOLIC BLOOD PRESSURE: 78 MMHG

## 2023-02-14 DIAGNOSIS — J02.9 SORE THROAT: ICD-10-CM

## 2023-02-14 DIAGNOSIS — Z20.818 STREP THROAT EXPOSURE: Primary | ICD-10-CM

## 2023-02-14 LAB — DEPRECATED S PYO AG THROAT QL EIA: NEGATIVE

## 2023-02-14 PROCEDURE — 99213 OFFICE O/P EST LOW 20 MIN: CPT | Performed by: PHYSICIAN ASSISTANT

## 2023-02-14 PROCEDURE — 87651 STREP A DNA AMP PROBE: CPT | Performed by: PHYSICIAN ASSISTANT

## 2023-02-14 RX ORDER — PENICILLIN V POTASSIUM 500 MG/1
500 TABLET, FILM COATED ORAL 2 TIMES DAILY
Qty: 20 TABLET | Refills: 0 | Status: SHIPPED | OUTPATIENT
Start: 2023-02-14 | End: 2023-02-24

## 2023-02-15 LAB — GROUP A STREP BY PCR: NOT DETECTED

## 2023-02-15 NOTE — PATIENT INSTRUCTIONS
Suggested increased rest increased fluids and bedside humidification  Over-the-counter Tylenol for comfort.  Follow packaging directions  Over-the-counter throat lozenges with benzocaine such as Cepacol may be used if indicated and is not a choking hazard based on age.  Follow packaging directions.  Do not overuse the benzocaine as it will dry the throat and make it uncomfortable.  Noncontagious after 24 hours on the antibiotic.  Change toothbrush out after 48 hours to avoid reinfecting the mouth.  Follow-up after completion of the antibiotic if any consultation or sequela.          Self-Care for Sore Throats  Sore throats happen for many reasons, such as colds, allergies, and infections caused by viruses or bacteria. In any case, your throat becomes red and sore. Your goal for self-care is to reduce your discomfort while giving your throat a chance to heal.    Moisten and soothe your throat  Tips include the following:  Try a sip of water first thing after waking up.  Keep your throat moist by drinking 6 or more glasses of clear liquids every day.  Run a cool-air humidifier in your room overnight.  Avoid cigarette smoke.   Suck on throat lozenges, cough drops, hard candy, ice chips, or frozen fruit-juice bars. Use the sugar-free versions if your diet or medical condition requires them.  Gargle to ease irritation  Gargling every hour or 2 can ease irritation. Try gargling with 1 of these solutions:  1/4 teaspoon of salt in 1/2 cup of warm water  An over-the-counter anesthetic gargle  Use medicine for more relief  Over-the-counter medicine can reduce sore throat symptoms. Ask your pharmacist if you have questions about which medicine to use:  Ease pain with anesthetic sprays. Aspirin or an aspirin substitute also helps. Remember, never give aspirin to anyone 18 or younger, or if you are already taking blood thinners.   For sore throats caused by allergies, try antihistamines to block the allergic reaction.  Remember:  unless a sore throat is caused by a bacterial infection, antibiotics won t help you.  Prevent future sore throats  Prevention tips include the following:  Stop smoking or reduce contact with secondhand smoke. Smoke irritates the tender throat lining.  Limit contact with pets and with allergy-causing substances, such as pollen and mold.  When you re around someone with a sore throat or cold, wash your hands often to keep viruses or bacteria from spreading.  Don t strain your vocal cords.  Call your healthcare provider  Contact your healthcare provider if you have:  A temperature over 101 F (38.3 C)  White spots on the throat  Great difficulty swallowing  Trouble breathing  A skin rash  Recent exposure to someone else with strep bacteria  Severe hoarseness and swollen glands in the neck or jaw   Date Last Reviewed: 8/1/2016 2000-2016 [employer]. [address] [place] [address], [place] [address]. All rights reserved. This information is not intended as a substitute for professional medical care. Always follow your healthcare professional's instructions.

## 2023-02-15 NOTE — PROGRESS NOTES
Patient presents with:  Pharyngitis: X 3 days       Clinical Decision Making:  Strep test was obtained and was negative.  Culture is to follow. Son is positive for strep and patient is symptomatic so empiric treatment with Pen VK is written. Symptomatic care was gone over. Expected course of resolution and indication for return was gone over and questions were answered to patient/parent's satisfaction before discharge.        ICD-10-CM    1. Sore throat  J02.9 Streptococcus A Rapid Screen w/Reflex to PCR - Clinic Collect     Group A Streptococcus PCR Throat Swab          Patient Instructions     Suggested increased rest increased fluids and bedside humidification  Over-the-counter Tylenol for comfort.  Follow packaging directions  Over-the-counter throat lozenges with benzocaine such as Cepacol may be used if indicated and is not a choking hazard based on age.  Follow packaging directions.  Do not overuse the benzocaine as it will dry the throat and make it uncomfortable.  Noncontagious after 24 hours on the antibiotic.  Change toothbrush out after 48 hours to avoid reinfecting the mouth.  Follow-up after completion of the antibiotic if any consultation or sequela.          Self-Care for Sore Throats  Sore throats happen for many reasons, such as colds, allergies, and infections caused by viruses or bacteria. In any case, your throat becomes red and sore. Your goal for self-care is to reduce your discomfort while giving your throat a chance to heal.    Moisten and soothe your throat  Tips include the following:    Try a sip of water first thing after waking up.    Keep your throat moist by drinking 6 or more glasses of clear liquids every day.    Run a cool-air humidifier in your room overnight.    Avoid cigarette smoke.     Suck on throat lozenges, cough drops, hard candy, ice chips, or frozen fruit-juice bars. Use the sugar-free versions if your diet or medical condition requires them.  Gargle to ease  irritation  Gargling every hour or 2 can ease irritation. Try gargling with 1 of these solutions:    1/4 teaspoon of salt in 1/2 cup of warm water    An over-the-counter anesthetic gargle  Use medicine for more relief  Over-the-counter medicine can reduce sore throat symptoms. Ask your pharmacist if you have questions about which medicine to use:    Ease pain with anesthetic sprays. Aspirin or an aspirin substitute also helps. Remember, never give aspirin to anyone 18 or younger, or if you are already taking blood thinners.     For sore throats caused by allergies, try antihistamines to block the allergic reaction.    Remember: unless a sore throat is caused by a bacterial infection, antibiotics won t help you.  Prevent future sore throats  Prevention tips include the following:    Stop smoking or reduce contact with secondhand smoke. Smoke irritates the tender throat lining.    Limit contact with pets and with allergy-causing substances, such as pollen and mold.    When you re around someone with a sore throat or cold, wash your hands often to keep viruses or bacteria from spreading.    Don t strain your vocal cords.  Call your healthcare provider  Contact your healthcare provider if you have:    A temperature over 101 F (38.3 C)    White spots on the throat    Great difficulty swallowing    Trouble breathing    A skin rash    Recent exposure to someone else with strep bacteria    Severe hoarseness and swollen glands in the neck or jaw   Date Last Reviewed: 8/1/2016 2000-2016 [employer]. [address] [place], [place] [address]. All rights reserved. This information is not intended as a substitute for professional medical care. Always follow your healthcare professional's instructions.        HPI:  Yeimy Blackmon is a 42 year old female who presents today with a chief complaint of having a 3-day history of sore throat odynophagia. Patient denies fever, chills, night sweats, fatigue, vomiting,  diarrhea, skin rash, abdominal pain or urinary symptoms.      Known sick contacts for strep throat with son who is seen in the office today and is positive for strep throat.    Has not tried treatment for this over-the-counter.    History obtained from chart review and the patient.    Problem List:  2022-08: Mass of soft tissue of foot  2022-08: Achilles tendinitis of right lower extremity  2022-05: Pyelonephritis  2022-05: Kidney stone  Migraine Headache      Past Medical History:   Diagnosis Date     De Quervain's syndrome (tenosynovitis) 09/01/2019    bilateral     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     low egg count, took 5 years to get pregnant with her first     Kidney stone 05/24/2022    dx with uti and 7 mm stone, stent placed at [facility]     Migraine      Normal delivery 11/01/2016     Normal delivery 02/03/2019       Social History     Tobacco Use     Smoking status: Never     Smokeless tobacco: Never   Substance Use Topics     Alcohol use: No     Comment: Alcoholic Drinks/day: 1-2 times a year       Review of Systems  As above in HPI otherwise negative.    Vitals:    02/14/23 1829   BP: 137/78   BP Location: Right arm   Patient Position: Sitting   Cuff Size: Adult Regular   Pulse: 100   Resp: 18   Temp: 98.8  F (37.1  C)   TempSrc: Oral   SpO2: 99%   Weight: 67.1 kg (148 lb)       General: Patient is resting comfortably no acute distress is afebrile  HEENT: Head is normocephalic atraumatic   eyes are PERRL EOMI sclera anicteric   TMs are clear bilaterally  Throat is with mild pharyngeal wall erythema and no exudate  No cervical lymphadenopathy present  Skin: Without rash non-diaphoretic    Physical Exam      Labs:  Results for orders placed or performed in visit on 02/14/23   Streptococcus A Rapid Screen w/Reflex to PCR - Clinic Collect     Status: Normal    Specimen: Throat; Swab   Result Value Ref Range    Group A Strep antigen Negative Negative     At the end of the encounter, I discussed  results, diagnosis, medications. Discussed red flags for immediate return to clinic/ER, as well as indications for follow up if no improvement. Patient understood and agreed to plan. Patient was stable for discharge.   No

## 2023-03-17 ENCOUNTER — E-VISIT (OUTPATIENT)
Dept: URGENT CARE | Facility: CLINIC | Age: 43
End: 2023-03-17
Payer: COMMERCIAL

## 2023-03-17 ENCOUNTER — LAB (OUTPATIENT)
Dept: LAB | Facility: CLINIC | Age: 43
End: 2023-03-17
Payer: COMMERCIAL

## 2023-03-17 DIAGNOSIS — R30.0 DYSURIA: ICD-10-CM

## 2023-03-17 DIAGNOSIS — N30.01 ACUTE CYSTITIS WITH HEMATURIA: Primary | ICD-10-CM

## 2023-03-17 LAB
ALBUMIN UR-MCNC: NEGATIVE MG/DL
APPEARANCE UR: CLEAR
BACTERIA #/AREA URNS HPF: ABNORMAL /HPF
BILIRUB UR QL STRIP: NEGATIVE
COLOR UR AUTO: YELLOW
GLUCOSE UR STRIP-MCNC: NEGATIVE MG/DL
HGB UR QL STRIP: ABNORMAL
KETONES UR STRIP-MCNC: NEGATIVE MG/DL
LEUKOCYTE ESTERASE UR QL STRIP: ABNORMAL
NITRATE UR QL: POSITIVE
PH UR STRIP: 6 [PH] (ref 5–8)
RBC #/AREA URNS AUTO: ABNORMAL /HPF
SP GR UR STRIP: 1.01 (ref 1–1.03)
SQUAMOUS #/AREA URNS AUTO: ABNORMAL /LPF
UROBILINOGEN UR STRIP-ACNC: 0.2 E.U./DL
WBC #/AREA URNS AUTO: ABNORMAL /HPF

## 2023-03-17 PROCEDURE — 87086 URINE CULTURE/COLONY COUNT: CPT

## 2023-03-17 PROCEDURE — 99421 OL DIG E/M SVC 5-10 MIN: CPT | Performed by: PREVENTIVE MEDICINE

## 2023-03-17 PROCEDURE — 81001 URINALYSIS AUTO W/SCOPE: CPT

## 2023-03-17 RX ORDER — SULFAMETHOXAZOLE/TRIMETHOPRIM 800-160 MG
1 TABLET ORAL 2 TIMES DAILY
Qty: 6 TABLET | Refills: 0 | Status: SHIPPED | OUTPATIENT
Start: 2023-03-17 | End: 2023-03-20

## 2023-03-17 NOTE — PATIENT INSTRUCTIONS
Dear Yeimy Blackmon,     After reviewing your responses, I would like you to come in for a urine test to make sure we treat you correctly. This urine test is to evaluate you for a possible urinary tract infection, and should be scheduled for today or tomorrow. Schedule a Lab Only appointment here.     Lab appointments are not available at most locations on the weekends. If no Lab Only appointment is available, you should be seen in any of our convenient Walk-in or Urgent Care Centers, which can be found on our website here.     You will receive instructions with your results in [facility] once they are available.     If your symptoms worsen, you develop pain in your back or stomach, develop fevers, or are not improving in 5 days, please contact your primary care provider for an appointment or visit a Walk-in or Urgent Care Center to be seen.     Thanks again for choosing us as your health care partner,     Buddy Villanueva MD, MD    Dysuria with Uncertain Cause (Adult)    The urethra is the tube that allows urine to pass out of the body. In a woman, the urethra is the opening above the vagina. In men, the urethra is the opening on the tip of the penis. Dysuria is the feeling of pain or burning in the urethra when passing urine.   Dysuria can be caused by anything that irritates or inflames the urethra. An infection or chemical irritation can cause this reaction. A bladder infection is the most common cause of dysuria in adults. A urine test can diagnose this. A bladder infection needs antibiotic treatment.   Soaps, lotions, colognes, and feminine hygiene products can cause dysuria. So can birth control jellies, creams, and foams. It will go away 1 to 3 days after discontinuing the use of these irritants.   Sexually transmitted infections (STIs), such as chlamydia or gonorrhea, can cause dysuria. Your healthcare provider may take a culture sample. Your provider may start you on antibiotic medicine  before the culture test returns.   In women who have gone through menopause, dysuria can be from dryness in the lining of the urethra. This can be treated with hormones. Dysuria becomes long-term (chronic) when it lasts for weeks or months. You may need to see a specialist (urologist) to diagnose and treat chronic dysuria.   Home care  These home care tips may help:    Don't use any chemicals or products that you think may be causing your symptoms.    If you were given a prescription medicine, take as directed. Take it until it's all used up.    If a culture was taken, don't have sex until you have been told that it is negative. A negative culture means you don't have an infection. Then follow your healthcare provider's advice to treat your condition.  If a culture was done and it is positive:     Both you and your sexual partner may need to be treated. This is true even if your partner has no symptoms.    Contact your healthcare provider or go to an urgent care clinic or the public health department to be looked at and treated.    Don't have sex until both you and your partner have finished all antibiotics and your healthcare provider says you are no longer contagious.    Learn about and use safe sex practices. The safest sex is with a partner who has tested negative and only has sex with you. Condoms can prevent STIs from spreading, but they aren't a guarantee.  Follow-up care  Follow up with your healthcare provider, or as advised. If a culture was taken, you may call as directed for the results. If you have an STI, follow up with your provider or the public health department for a complete STI screening, including HIV testing. For more information, contact CDC-INFO at [phone].   When to call your healthcare provider   Call your healthcare provider right away if any of these occur:    You aren't better after 3 days of treatment    Fever of 100.4 F (38 C) or higher, or as directed by your healthcare  provider    Back or belly pain that gets worse    You can't urinate because of pain    New discharge from the urethra, vagina, or penis    Painful sores on the penis    Rash or joint pain    Painful lumps (lymph nodes) in the groin    Testicle pain or swelling of the scrotum  Reza last reviewed this educational content on 6/1/2022 2000-2022 [employer]. All rights reserved. This information is not intended as a substitute for professional medical care. Always follow your healthcare professional's instructions.

## 2023-03-19 LAB — BACTERIA UR CULT: NORMAL

## 2023-03-23 PROBLEM — Z87.442 HISTORY OF KIDNEY STONES: Status: ACTIVE | Noted: 2023-03-23

## 2023-03-23 PROBLEM — Z87.448 HISTORY OF PYELONEPHRITIS: Status: ACTIVE | Noted: 2023-03-23

## 2023-03-23 PROBLEM — N12 PYELONEPHRITIS: Status: RESOLVED | Noted: 2022-05-31 | Resolved: 2023-03-23

## 2023-03-23 PROBLEM — N20.0 KIDNEY STONE: Status: RESOLVED | Noted: 2022-05-31 | Resolved: 2023-03-23

## 2023-03-24 ENCOUNTER — OFFICE VISIT (OUTPATIENT)
Dept: FAMILY MEDICINE | Facility: CLINIC | Age: 43
End: 2023-03-24
Payer: COMMERCIAL

## 2023-03-24 VITALS
BODY MASS INDEX: 21.62 KG/M2 | TEMPERATURE: 98.1 F | OXYGEN SATURATION: 100 % | HEART RATE: 93 BPM | HEIGHT: 70 IN | DIASTOLIC BLOOD PRESSURE: 78 MMHG | RESPIRATION RATE: 16 BRPM | WEIGHT: 151 LBS | SYSTOLIC BLOOD PRESSURE: 130 MMHG

## 2023-03-24 DIAGNOSIS — Z87.442 HISTORY OF KIDNEY STONES: ICD-10-CM

## 2023-03-24 DIAGNOSIS — Z87.448 HISTORY OF PYELONEPHRITIS: ICD-10-CM

## 2023-03-24 DIAGNOSIS — N39.0 URINARY TRACT INFECTION WITHOUT HEMATURIA, SITE UNSPECIFIED: Primary | ICD-10-CM

## 2023-03-24 LAB
ALBUMIN UR-MCNC: NEGATIVE MG/DL
APPEARANCE UR: CLEAR
BACTERIA #/AREA URNS HPF: ABNORMAL /HPF
BILIRUB UR QL STRIP: NEGATIVE
COLOR UR AUTO: YELLOW
GLUCOSE UR STRIP-MCNC: NEGATIVE MG/DL
HGB UR QL STRIP: NEGATIVE
KETONES UR STRIP-MCNC: NEGATIVE MG/DL
LEUKOCYTE ESTERASE UR QL STRIP: ABNORMAL
NITRATE UR QL: NEGATIVE
PH UR STRIP: 7 [PH] (ref 5–8)
RBC #/AREA URNS AUTO: ABNORMAL /HPF
SP GR UR STRIP: 1.01 (ref 1–1.03)
SQUAMOUS #/AREA URNS AUTO: ABNORMAL /LPF
UROBILINOGEN UR STRIP-ACNC: 0.2 E.U./DL
WBC #/AREA URNS AUTO: ABNORMAL /HPF

## 2023-03-24 PROCEDURE — 99213 OFFICE O/P EST LOW 20 MIN: CPT | Performed by: FAMILY MEDICINE

## 2023-03-24 PROCEDURE — 81001 URINALYSIS AUTO W/SCOPE: CPT | Performed by: FAMILY MEDICINE

## 2023-03-24 PROCEDURE — 87086 URINE CULTURE/COLONY COUNT: CPT | Performed by: FAMILY MEDICINE

## 2023-03-24 RX ORDER — NITROFURANTOIN 25; 75 MG/1; MG/1
100 CAPSULE ORAL 2 TIMES DAILY
Qty: 20 CAPSULE | Refills: 0 | Status: SHIPPED | OUTPATIENT
Start: 2023-03-24 | End: 2023-08-14

## 2023-03-24 ASSESSMENT — PAIN SCALES - GENERAL: PAINLEVEL: NO PAIN (0)

## 2023-03-24 NOTE — PROGRESS NOTES
PROGRESS NOTE   3/24/2023    SUBJECTIVE:  Yeimy Blackmon is a 42 year old female who presents for     Chief Complaint   Patient presents with     UTI     3 day antibiotic completed Monday. Abdominal pain/cramping. HX of UTI. Tuesday and Thursday at home testing was positive for UTI.      Patient comes in today because of continued urinary tract infection symptoms.  She notes that she started having abdominal pain and, cramping on 3/16/2023.  She thought this was probably a urinary tract infection as she has had several of those before and this feels very typical of when she gets a urinary tract infection.  She was going to go to urgent care that day but did not and then went to urgent care the next day and there was a 2-hour wait.  She did not E-visit as she was sitting in the waiting room and the E-visit got back to her and gave her Septra.  She took that for 3 days and on Friday she felt a little bit better after she had taken even 1-2 doses of the medicine and Saturday she thought she was getting better but then by Sunday night she started to have cramping again.  She did have 1 pill left so she took that on Monday morning but the pain persisted.  She does note that she felt constipated on Sunday night into Monday as well and finally had a bowel movement on Tuesday but the abdominal pain continued.  She did do an Azo test over-the-counter and it still showed positive for urinary tract infection on Tuesday as well as yesterday so she comes in for evaluation.  She has not been running any fever at all.  She is functioning and she is doing what she needs to do but the stomach pain is annoying.  She has been using ibuprofen and that really has not seemed to help.  She been drinking a lot of water and that does not seem to help either.  She has not noticed any blood in her urine she does have increased abdominal pain when she urinates but then it goes down again after she finishes urination.  She notes that the pain  that she is having currently is not totally typical of urinary tract infection as far as the burning when she pees.  It hurts more inside than it does outside.  I did review the urine culture from 3/17/2023 and indeed it did not grow any significant infection.  She does have a history of having bad pyelonephritis on 5/24/2022 where she was hospitalized for several days because of Robbie and a kidney stone.  She is not having any symptoms that be suggestive of a kidney stone.    Patient Active Problem List   Diagnosis     Migraine Headache     Mass of soft tissue of foot     Achilles tendinitis of right lower extremity     History of pyelonephritis     History of kidney stones       Current Outpatient Medications   Medication Sig Dispense Refill     ibuprofen (ADVIL/MOTRIN) 200 MG tablet Take 800 mg by mouth every 8 hours as needed for mild pain       levonorgestrel-ethinyl estradiol (SEASONALE) 0.15-0.03 MG tablet Take 1 tablet by mouth daily 84 tablet 4     nitroFURantoin macrocrystal-monohydrate (MACROBID) 100 MG capsule Take 1 capsule (100 mg) by mouth 2 times daily 20 capsule 0     SUMAtriptan (IMITREX) 50 MG tablet TAKE 1 TABLET (50 MG) BY MOUTH ONCE AS NEEDED FOR MIGRAINE 10 tablet 2       No Known Allergies    Past Medical History:   Diagnosis Date     De Quervain's syndrome (tenosynovitis) 09/01/2019    bilateral     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     low egg count, took 5 years to get pregnant with her first     Kidney stone 05/24/2022    dx with uti and 7 mm stone, stent placed at [facility]     Migraine      Normal delivery 11/01/2016     Normal delivery 02/03/2019       Past Surgical History:   Procedure Laterality Date     cystoscopy with right ureteral stent placement  05/25/2022    R 7 mm stone with uti at the time, hospitalized       History   Smoking Status     Never   Smokeless Tobacco     Never       OBJECTIVE:     /78   Pulse 93   Temp 98.1  F (36.7  C) (Oral)   Resp 16   Ht  "1.778 m (5' 10\")   Wt 68.5 kg (151 lb)   LMP 03/09/2023 (Approximate)   SpO2 100%   Breastfeeding No   BMI 21.67 kg/m      Physical Exam:  GENERAL APPEARANCE: A&A, NAD, well hydrated, well nourished  SKIN:  Normal skin turgor, no lesions/rashes   HEENT: moist mucous membranes, no rhinorrhea, PERRLA, TM's clear bilaterally, Throat without significant erythema or exudate.  NECK: Supple, full ROM, no significant lymphadenopathy or thyromegaly  CV: RRR, no M/G/R   LUNGS: CTAB  ABDOMEN: S&NT, no masses or organomegaly, positive bowel sounds, no CVA tenderness was appreciated.  EXTREMITY: no swelling noted.  Full range of motion of all 4 extremities.   NEURO: no gross deficits   PSYCHIATRIC;  Mood appropriate, memory intact    LABS:     Recent Results (from the past 240 hour(s))   UA reflex to Microscopic and Culture    Collection Time: 03/17/23  1:32 PM    Specimen: Urine, Midstream   Result Value Ref Range    Color Urine Yellow Colorless, Straw, Light Yellow, Yellow    Appearance Urine Clear Clear    Glucose Urine Negative Negative mg/dL    Bilirubin Urine Negative Negative    Ketones Urine Negative Negative mg/dL    Specific Gravity Urine 1.010 1.005 - 1.030    Blood Urine Trace (A) Negative    pH Urine 6.0 5.0 - 8.0    Protein Albumin Urine Negative Negative mg/dL    Urobilinogen Urine 0.2 0.2, 1.0 E.U./dL    Nitrite Urine Positive (A) Negative    Leukocyte Esterase Urine Trace (A) Negative   Urine Microscopic Exam    Collection Time: 03/17/23  1:32 PM   Result Value Ref Range    Bacteria Urine Few (A) None Seen /HPF    RBC Urine 0-2 0-2 /HPF /HPF    WBC Urine 0-5 0-5 /HPF /HPF    Squamous Epithelials Urine Few (A) None Seen /LPF   Urine Culture    Collection Time: 03/17/23  1:32 PM    Specimen: Urine, Midstream   Result Value Ref Range    Culture 10,000-50,000 CFU/mL Mixture of urogenital lionel    UA reflex to Microscopic    Collection Time: 03/24/23 11:42 AM   Result Value Ref Range    Color Urine Yellow " Colorless, Straw, Light Yellow, Yellow    Appearance Urine Clear Clear    Glucose Urine Negative Negative mg/dL    Bilirubin Urine Negative Negative    Ketones Urine Negative Negative mg/dL    Specific Gravity Urine 1.010 1.005 - 1.030    Blood Urine Negative Negative    pH Urine 7.0 5.0 - 8.0    Protein Albumin Urine Negative Negative mg/dL    Urobilinogen Urine 0.2 0.2, 1.0 E.U./dL    Nitrite Urine Negative Negative    Leukocyte Esterase Urine Trace (A) Negative   Urine Microscopic Exam    Collection Time: 03/24/23 11:42 AM   Result Value Ref Range    Bacteria Urine None Seen None Seen /HPF    RBC Urine 0-2 0-2 /HPF /HPF    WBC Urine 0-5 0-5 /HPF /HPF    Squamous Epithelials Urine Moderate (A) None Seen /LPF   Urine Culture    Collection Time: 03/24/23 11:43 AM    Specimen: Urine, NOS   Result Value Ref Range    Culture <10,000 CFU/mL Mixture of urogenital lionel       I did review ED visit from 3/17/2023 where she was given Septra for 3 days.  I also reviewed urinalysis and urine micro and UC from that day.    ASSESSMENT/PLAN:     Urinary tract infection without hematuria, site unspecified  - UA reflex to Microscopic  - Urine Culture  - Adult Urology  Referral  - UA reflex to Microscopic  - Urine Culture  - Urine Microscopic Exam  - nitroFURantoin macrocrystal-monohydrate (MACROBID) 100 MG capsule  Dispense: 20 capsule; Refill: 0    History of pyelonephritis  - UA reflex to Microscopic  - Urine Culture  - Adult Urology  Referral  - UA reflex to Microscopic  - Urine Culture  - Urine Microscopic Exam  - nitroFURantoin macrocrystal-monohydrate (MACROBID) 100 MG capsule  Dispense: 20 capsule; Refill: 0    History of kidney stones  - UA reflex to Microscopic  - Urine Culture  - Adult Urology  Referral  - UA reflex to Microscopic  - Urine Culture  - Urine Microscopic Exam  - nitroFURantoin macrocrystal-monohydrate (MACROBID) 100 MG capsule  Dispense: 20 capsule; Refill: 0    Patient comes in  today because of persistence of urinary tract infection symptoms despite the use of Septra for 3 days.  She notes that she did an E-visit on 3/17/2023 and was diagnosed with a urinary tract infection.  I did review the lab results from that day and urine culture actually did not grow out any significant infection.  She took Septra on for about a day and a half she thought she was getting better but then it seemed like that her symptoms returned.  She has not been running any fevers but she does have burning inside similar to when she has had kidney infections in the past.  She was hospitalized last May for very severe pyelonephritis and kidney stones and certainly she does not want that to occur nor do I.  I think at this point that she was only treated for 3 days that we should treat her with an antibiotic for a total of 10 days.  Her urinalysis today was mildly suggestive of a urinary tract infection with leukocyte esterase still be present but no nitrites.  We will contact her with the results of the urine culture when it returns.  In the meantime we will start her on some Macrobid.  This is twice a day for 10 days.  I will contact her with the results of the lab work when it returns.  Because of her history of pyelonephritis and kidney stones and the severity of her infection a year ago I do think she probably needs to see the urologist for further evaluation.  I am a little bit concerned about the fact that her symptoms did not resolve with the 3 days of Septra.  If she has complete resolution of her symptoms once she is finished the Macrobid that I think she probably can cancel her urology appointment however I urged her to make an appointment with the urologist just in case.  If things get worse she certainly should let me know.  All of her questions were answered today.   Tabatha Garrett MD    This note was dictated using voice recognition software. Any grammatical errors, context distortions, or spelling  errors are not intentional   Additional Questions 3/24/2023   Roomed by Alona MENDEZ LPN     UTI    History of Present Illness       Reason for visit:  UTI    She eats 2-3 servings of fruits and vegetables daily.She consumes 1 sweetened beverage(s) daily.She exercises with enough effort to increase her heart rate 30 to 60 minutes per day.  She exercises with enough effort to increase her heart rate 3 or less days per week.   She is taking medications regularly.

## 2023-03-26 LAB — BACTERIA UR CULT: NORMAL

## 2023-04-17 ENCOUNTER — TRANSFERRED RECORDS (OUTPATIENT)
Dept: HEALTH INFORMATION MANAGEMENT | Facility: CLINIC | Age: 43
End: 2023-04-17
Payer: COMMERCIAL

## 2023-04-19 ENCOUNTER — TRANSFERRED RECORDS (OUTPATIENT)
Dept: HEALTH INFORMATION MANAGEMENT | Facility: CLINIC | Age: 43
End: 2023-04-19
Payer: COMMERCIAL

## 2023-06-30 DIAGNOSIS — G43.809 OTHER MIGRAINE WITHOUT STATUS MIGRAINOSUS, NOT INTRACTABLE: ICD-10-CM

## 2023-06-30 RX ORDER — SUMATRIPTAN 50 MG/1
50 TABLET, FILM COATED ORAL
Qty: 10 TABLET | Refills: 2 | Status: SHIPPED | OUTPATIENT
Start: 2023-06-30 | End: 2023-08-14

## 2023-06-30 NOTE — TELEPHONE ENCOUNTER
Refill Request  Medication name: Pending Prescriptions:                       Disp   Refills    SUMAtriptan (IMITREX) 50 MG tablet        10 tab*2            Sig: Take 1 tablet (50 mg) by mouth once as needed for           migraine    Requested Pharmacy:  [place] [id number] [id number]     *Pt only has 1 pill left-pharmacy told her they sent us a request over a week ago- she is worried she will need over the weekend

## 2023-07-31 ENCOUNTER — E-VISIT (OUTPATIENT)
Dept: URGENT CARE | Facility: URGENT CARE | Age: 43
End: 2023-07-31
Payer: COMMERCIAL

## 2023-07-31 DIAGNOSIS — J01.90 ACUTE BACTERIAL SINUSITIS: Primary | ICD-10-CM

## 2023-07-31 DIAGNOSIS — B96.89 ACUTE BACTERIAL SINUSITIS: Primary | ICD-10-CM

## 2023-07-31 PROCEDURE — 99421 OL DIG E/M SVC 5-10 MIN: CPT | Performed by: PHYSICIAN ASSISTANT

## 2023-07-31 NOTE — PATIENT INSTRUCTIONS
You may want to try a nasal lavage (also known as nasal irrigation). You can find over-the-counter products, such as Neti-Pot, at retail locations or make your own at home. Instructions for homemade nasal lavage and more information on the process are available online at http://[email or web address].org/[email or web address]/[email or web address].    Dear Yeimy Blackmon    After reviewing your responses, I've been able to diagnose you with Acute bacterial sinusitis.      Based on your responses and diagnosis, I have prescribed No orders of the defined types were placed in this encounter.   to treat your symptoms. I have sent this to your pharmacy.?     It is also important to stay well hydrated, get lots of rest and take over-the-counter decongestants,?tylenol?or ibuprofen if you?are able to?take those medications per your primary care provider to help relieve discomfort.?     It is important that you take?all of?your prescribed medication even if your symptoms are improving after a few doses.? Taking?all of?your medicine helps prevent the symptoms from returning.?     If your symptoms worsen, you develop severe headache, vomiting, high fever (>102), or are not improving in 7 days, please contact your primary care provider for an appointment or visit any of our convenient Walk-in Care or Urgent Care Centers to be seen which can be found on our website?here.?     Thanks again for choosing?us?as your health care partner,?   ?  Yeimy Boyce PA-C?

## 2023-08-14 ENCOUNTER — OFFICE VISIT (OUTPATIENT)
Dept: FAMILY MEDICINE | Facility: CLINIC | Age: 43
End: 2023-08-14
Payer: COMMERCIAL

## 2023-08-14 VITALS
OXYGEN SATURATION: 100 % | BODY MASS INDEX: 21.47 KG/M2 | RESPIRATION RATE: 12 BRPM | TEMPERATURE: 97.8 F | DIASTOLIC BLOOD PRESSURE: 70 MMHG | HEIGHT: 70 IN | SYSTOLIC BLOOD PRESSURE: 120 MMHG | HEART RATE: 88 BPM | WEIGHT: 150 LBS

## 2023-08-14 DIAGNOSIS — J01.90 ACUTE BACTERIAL SINUSITIS: ICD-10-CM

## 2023-08-14 DIAGNOSIS — Z00.00 ROUTINE GENERAL MEDICAL EXAMINATION AT A HEALTH CARE FACILITY: Primary | ICD-10-CM

## 2023-08-14 DIAGNOSIS — N92.6 IRREGULAR MENSES: ICD-10-CM

## 2023-08-14 DIAGNOSIS — E53.8 VITAMIN B12 DEFICIENCY (NON ANEMIC): ICD-10-CM

## 2023-08-14 DIAGNOSIS — G43.809 OTHER MIGRAINE WITHOUT STATUS MIGRAINOSUS, NOT INTRACTABLE: ICD-10-CM

## 2023-08-14 DIAGNOSIS — B96.89 ACUTE BACTERIAL SINUSITIS: ICD-10-CM

## 2023-08-14 DIAGNOSIS — R68.82 DECREASED LIBIDO: ICD-10-CM

## 2023-08-14 DIAGNOSIS — R53.83 FATIGUE, UNSPECIFIED TYPE: ICD-10-CM

## 2023-08-14 PROBLEM — N20.1 URETEROLITHIASIS: Status: ACTIVE | Noted: 2023-08-14

## 2023-08-14 LAB
ALBUMIN SERPL BCG-MCNC: 4.7 G/DL (ref 3.5–5.2)
ALP SERPL-CCNC: 55 U/L (ref 35–104)
ALT SERPL W P-5'-P-CCNC: 21 U/L (ref 0–50)
ANION GAP SERPL CALCULATED.3IONS-SCNC: 9 MMOL/L (ref 7–15)
AST SERPL W P-5'-P-CCNC: 22 U/L (ref 0–45)
BILIRUB SERPL-MCNC: 0.3 MG/DL
BUN SERPL-MCNC: 9.8 MG/DL (ref 6–20)
CALCIUM SERPL-MCNC: 9.6 MG/DL (ref 8.6–10)
CHLORIDE SERPL-SCNC: 107 MMOL/L (ref 98–107)
CREAT SERPL-MCNC: 0.85 MG/DL (ref 0.51–0.95)
DEPRECATED HCO3 PLAS-SCNC: 25 MMOL/L (ref 22–29)
ERYTHROCYTE [DISTWIDTH] IN BLOOD BY AUTOMATED COUNT: 12.2 % (ref 10–15)
GFR SERPL CREATININE-BSD FRML MDRD: 87 ML/MIN/1.73M2
GLUCOSE SERPL-MCNC: 112 MG/DL (ref 70–99)
HCT VFR BLD AUTO: 39.9 % (ref 35–47)
HGB BLD-MCNC: 13.4 G/DL (ref 11.7–15.7)
MCH RBC QN AUTO: 31.4 PG (ref 26.5–33)
MCHC RBC AUTO-ENTMCNC: 33.6 G/DL (ref 31.5–36.5)
MCV RBC AUTO: 93 FL (ref 78–100)
PLATELET # BLD AUTO: 311 10E3/UL (ref 150–450)
POTASSIUM SERPL-SCNC: 4.2 MMOL/L (ref 3.4–5.3)
PROT SERPL-MCNC: 7.2 G/DL (ref 6.4–8.3)
RBC # BLD AUTO: 4.27 10E6/UL (ref 3.8–5.2)
SODIUM SERPL-SCNC: 141 MMOL/L (ref 136–145)
TSH SERPL DL<=0.005 MIU/L-ACNC: 1.2 UIU/ML (ref 0.3–4.2)
VIT B12 SERPL-MCNC: 217 PG/ML (ref 232–1245)
WBC # BLD AUTO: 7.2 10E3/UL (ref 4–11)

## 2023-08-14 PROCEDURE — 99396 PREV VISIT EST AGE 40-64: CPT | Performed by: NURSE PRACTITIONER

## 2023-08-14 PROCEDURE — 84443 ASSAY THYROID STIM HORMONE: CPT | Performed by: NURSE PRACTITIONER

## 2023-08-14 PROCEDURE — 99214 OFFICE O/P EST MOD 30 MIN: CPT | Mod: 25 | Performed by: NURSE PRACTITIONER

## 2023-08-14 PROCEDURE — 80053 COMPREHEN METABOLIC PANEL: CPT | Performed by: NURSE PRACTITIONER

## 2023-08-14 PROCEDURE — 36415 COLL VENOUS BLD VENIPUNCTURE: CPT | Performed by: NURSE PRACTITIONER

## 2023-08-14 PROCEDURE — 82607 VITAMIN B-12: CPT | Performed by: NURSE PRACTITIONER

## 2023-08-14 PROCEDURE — 82306 VITAMIN D 25 HYDROXY: CPT | Performed by: NURSE PRACTITIONER

## 2023-08-14 PROCEDURE — 85027 COMPLETE CBC AUTOMATED: CPT | Performed by: NURSE PRACTITIONER

## 2023-08-14 RX ORDER — LEVONORGESTREL AND ETHINYL ESTRADIOL 0.15-0.03
1 KIT ORAL DAILY
Qty: 84 TABLET | Refills: 4 | Status: SHIPPED | OUTPATIENT
Start: 2023-08-14 | End: 2024-08-14

## 2023-08-14 RX ORDER — SUMATRIPTAN 50 MG/1
50 TABLET, FILM COATED ORAL
Qty: 30 TABLET | Refills: 1 | Status: SHIPPED | OUTPATIENT
Start: 2023-08-14 | End: 2023-09-28

## 2023-08-14 ASSESSMENT — ENCOUNTER SYMPTOMS
SORE THROAT: 0
DYSURIA: 0
ARTHRALGIAS: 0
ABDOMINAL PAIN: 0
DIZZINESS: 1
WEAKNESS: 0
CONSTIPATION: 0
MYALGIAS: 0
COUGH: 0
BREAST MASS: 0
DIARRHEA: 0
SHORTNESS OF BREATH: 0
CHILLS: 0
FREQUENCY: 0
FEVER: 0
HEMATURIA: 0
HEARTBURN: 0
EYE PAIN: 0
JOINT SWELLING: 0
HEMATOCHEZIA: 0
PALPITATIONS: 0
NERVOUS/ANXIOUS: 0
PARESTHESIAS: 0
HEADACHES: 1
NAUSEA: 0

## 2023-08-14 NOTE — PATIENT INSTRUCTIONS
Gingko biloba- per package directions for fatigue     Preventive Health Recommendations  Female Ages 40 to 49    Yearly exam:   See your health care provider every year in order to  Review health changes.   Discuss preventive care.    Review your medicines if your doctor prescribed any.    Get a Pap test every three years (unless you have an abnormal result and your provider advises testing more often).    If you get Pap tests with HPV test, you only need to test every 5 years, unless you have an abnormal result. You do not need a Pap test if your uterus was removed (hysterectomy) and you have not had cancer.    You should be tested each year for STDs (sexually transmitted diseases), if you're at risk.   Ask your doctor if you should have a mammogram.    Have a colonoscopy (test for colon cancer) if someone in your family has had colon cancer or polyps before age 50.     Have a cholesterol test every 5 years.     Have a diabetes test (fasting glucose) after age 45. If you are at risk for diabetes, you should have this test every 3 years.    Shots: Get a flu shot each year. Get a tetanus shot every 10 years.     Nutrition:   Eat at least 5 servings of fruits and vegetables each day.  Eat whole-grain bread, whole-wheat pasta and brown rice instead of white grains and rice.  Get adequate Calcium and Vitamin D.      Lifestyle  Exercise at least 150 minutes a week (an average of 30 minutes a day, 5 days a week). This will help you control your weight and prevent disease.  Limit alcohol to one drink per day.  No smoking.   Wear sunscreen to prevent skin cancer.  See your dentist every six months for an exam and cleaning.

## 2023-08-15 LAB — DEPRECATED CALCIDIOL+CALCIFEROL SERPL-MC: 38 UG/L (ref 20–75)

## 2023-08-15 RX ORDER — CYANOCOBALAMIN 1000 UG/ML
1000 INJECTION, SOLUTION INTRAMUSCULAR; SUBCUTANEOUS
Status: ACTIVE | OUTPATIENT
Start: 2023-08-17 | End: 2024-08-10

## 2023-08-16 ENCOUNTER — MYC MEDICAL ADVICE (OUTPATIENT)
Dept: FAMILY MEDICINE | Facility: CLINIC | Age: 43
End: 2023-08-16
Payer: COMMERCIAL

## 2023-08-22 ENCOUNTER — ALLIED HEALTH/NURSE VISIT (OUTPATIENT)
Dept: FAMILY MEDICINE | Facility: CLINIC | Age: 43
End: 2023-08-22
Payer: COMMERCIAL

## 2023-08-22 DIAGNOSIS — E53.8 VITAMIN B12 DEFICIENCY (NON ANEMIC): Primary | ICD-10-CM

## 2023-08-22 PROCEDURE — 99207 PR NO CHARGE NURSE ONLY: CPT

## 2023-08-22 PROCEDURE — 96372 THER/PROPH/DIAG INJ SC/IM: CPT | Performed by: NURSE PRACTITIONER

## 2023-08-22 RX ADMIN — CYANOCOBALAMIN 1000 MCG: 1000 INJECTION, SOLUTION INTRAMUSCULAR; SUBCUTANEOUS at 14:58

## 2023-08-22 NOTE — PROGRESS NOTES
Clinic Administered Medication Documentation      Injectable Medication Documentation    Is there an active order (written within the past 365 days, with administrations remaining, not ) in the chart? Yes.     Patient was given Cyanocobalamin (B-12). Prior to medication administration, verified patient's identity using patient s name and date of birth. Please see MAR and medication order for additional information. Patient instructed to remain in clinic for 15 minutes and report any adverse reaction to staff immediately.    Vial/Syringe: Single dose vial. Was entire vial of medication used? Yes  Was this medication supplied by the patient? No  Is this a medication the patient will need to receive again? Yes. Verified that the patient has refills remaining in their prescription.

## 2023-08-29 ENCOUNTER — MYC MEDICAL ADVICE (OUTPATIENT)
Dept: FAMILY MEDICINE | Facility: CLINIC | Age: 43
End: 2023-08-29
Payer: COMMERCIAL

## 2023-08-30 ENCOUNTER — NURSE TRIAGE (OUTPATIENT)
Dept: FAMILY MEDICINE | Facility: CLINIC | Age: 43
End: 2023-08-30
Payer: COMMERCIAL

## 2023-08-30 DIAGNOSIS — J01.01 ACUTE RECURRENT MAXILLARY SINUSITIS: Primary | ICD-10-CM

## 2023-08-30 NOTE — TELEPHONE ENCOUNTER
"Nurse Triage SBAR    Is this a 2nd Level Triage? YES, LICENSED PRACTITIONER REVIEW IS REQUIRED    Situation: Patient sent "[employer] message:   \"Alex Houston,   I saw you on 8/14 and was prescribed a second round of Augmentin for a sinus infection. I finished that on 8/21 with reduced symptoms. However by the weekend I was feeling the same symptoms again, inside of nose irritation and congestion. The congestion wakes me up at night. Today the inside of my nose is very sore like it was prior to the first round of meds.    If I need more meds wanted to let you know I am pretty sure I got a yeast infection from the second round of Augmentin.    Let me know what I should do next.    Thank you,  Yeimy\"    Background: Patient has had ongoing symptoms since early July. Patient has been prescribed 2 rounds of Augmentin, 1 tab BID for 7 days, starting 7/31-8/7 and 8/14-8/21.  Patient completed visit on 7/31 and was seen in office on 8/14.    Assessment: Patient reports that congestion is worsening and wakes her up at night. Patient also reports sore nasal passage that feels like there are sores in her nose.     Patient denies fever, shortness of breath and swelling/redness of cheeks, eyes and face. Patient denies sinus pain at this time, but states she feels it might be returning.     Patient reports she was doing nasal irrigation daily and it helped to soothe/felt good but did not reduce symptoms.    Protocol Recommended Disposition:   See in Office Within 3 Days    Recommendation: Disposition is to be seen within 3 days. Patient is wondering if she needs a 3rd visit for evaluation or if another prescription is indicated at this time.    Patient requests not receiving Augmentin again, as she believes is caused a yeast infection - yeast infection resolved with OTC treatment.     Routed to provider to review and advise.     Does the patient meet one of the following criteria for ADS visit consideration? 16+ years old, with an " "[facility] PCP     TIP  Providers, please consider if this condition is appropriate for management at one of our Acute and Diagnostic Services sites.     If patient is a good candidate, please use dotphrase <dot>triageresponse and select Refer to ADS to document.     Reason for Disposition   Taking antibiotic > 7 days and nasal discharge not improved    Additional Information   Negative: SEVERE difficulty breathing (e.g., struggling for each breath, speaks in single words)   Negative: Sounds like a life-threatening emergency to the triager   Negative: Difficulty breathing and not from stuffy nose (e.g., not relieved by cleaning out the nose)   Negative: SEVERE headache and fever   Negative: Taking antibiotic > 24 hours and fever > 103 F (39.4 C)   Negative: Redness or swelling on the cheek, forehead or around the eye and fever   Negative: Patient sounds very sick or weak to the triager   Negative: SEVERE sinus pain and not improved 2 hours after pain medicine   Negative: Redness or swelling on the cheek, forehead or around the eye and new since starting antibiotics   Negative: Taking antibiotic > 48 hours (2 days) and fever persists   Negative: Taking antibiotic > 72 hours (3 days) and sinus pain not improved   Negative: Patient wants to be seen    Answer Assessment - Initial Assessment Questions  1. ANTIBIOTIC: \"What antibiotic are you receiving?\" \"How many times per day?\"      Augmentin  2. ONSET: \"When was the antibiotic started?\"      7/31, 2 rounds  3. PAIN: \"How bad is the sinus pain?\"   (Scale 1-10; mild, moderate or severe)    - MILD (1-3): doesn't interfere with normal activities     - MODERATE (4-7): interferes with normal activities (e.g., work or school) or awakens from sleep    - SEVERE (8-10): excruciating pain and patient unable to do any normal activities         No pain/pressure as of now.   4. FEVER: \"Do you have a fever?\" If Yes, ask: \"What is it, how was it measured, and when did it start?\"       " "No  5. SYMPTOMS: \"Are there any other symptoms you're concerned about?\" If Yes, ask: \"When did it start?\"      Early July. Congestion, sore nasal passage.  6. PREGNANCY: \"Is there any chance you are pregnant?\" \"When was your last menstrual period?\"      No    Protocols used: Sinus Infection on Antibiotic Follow-up Call-A-[place]    Joseline Brooks RN  [facility]    "

## 2023-08-31 NOTE — TELEPHONE ENCOUNTER
Recommend CT of sinuses, possibly ENT consult.  Order placed for CT.  Please check if she needs Diflucan.  Continue decongestants, sinus irrigation, etc.

## 2023-08-31 NOTE — TELEPHONE ENCOUNTER
Left message to call back for: patient  Information to relay to patient: see message from provider below. Cassie Jordan, CMA on 8/31/2023 at 1:29 PM

## 2023-09-01 NOTE — TELEPHONE ENCOUNTER
Patient notified of this information, states understanding, and has no further questions.    Declined Diflucan rx.

## 2023-09-06 ENCOUNTER — HOSPITAL ENCOUNTER (OUTPATIENT)
Dept: CT IMAGING | Facility: CLINIC | Age: 43
Discharge: HOME | End: 2023-09-06
Attending: NURSE PRACTITIONER | Admitting: NURSE PRACTITIONER
Payer: COMMERCIAL

## 2023-09-06 DIAGNOSIS — J01.01 ACUTE RECURRENT MAXILLARY SINUSITIS: ICD-10-CM

## 2023-09-06 PROCEDURE — 70486 CT MAXILLOFACIAL W/O DYE: CPT

## 2023-09-07 ENCOUNTER — MYC MEDICAL ADVICE (OUTPATIENT)
Dept: FAMILY MEDICINE | Facility: CLINIC | Age: 43
End: 2023-09-07
Payer: COMMERCIAL

## 2023-09-07 DIAGNOSIS — J34.89 SINUS PAIN: Primary | ICD-10-CM

## 2023-09-07 DIAGNOSIS — J01.01 ACUTE RECURRENT MAXILLARY SINUSITIS: ICD-10-CM

## 2023-09-20 ENCOUNTER — ALLIED HEALTH/NURSE VISIT (OUTPATIENT)
Dept: FAMILY MEDICINE | Facility: CLINIC | Age: 43
End: 2023-09-20
Payer: COMMERCIAL

## 2023-09-20 DIAGNOSIS — E53.8 VITAMIN B12 DEFICIENCY (NON ANEMIC): Primary | ICD-10-CM

## 2023-09-20 PROCEDURE — 96372 THER/PROPH/DIAG INJ SC/IM: CPT | Performed by: NURSE PRACTITIONER

## 2023-09-20 PROCEDURE — 99207 PR NO CHARGE NURSE ONLY: CPT

## 2023-09-20 RX ADMIN — CYANOCOBALAMIN 1000 MCG: 1000 INJECTION, SOLUTION INTRAMUSCULAR; SUBCUTANEOUS at 14:35

## 2023-09-26 NOTE — PROGRESS NOTES
SUBJECTIVE:   CC: Yeimy is an 43 year old who presents for preventive health visit.       2023    12:42 PM   Additional Questions   Roomed by Carmel Yu   Accompanied by none       Healthy Habits:     Getting at least 3 servings of Calcium per day:  Yes    Bi-annual eye exam:  Yes    Dental care twice a year:  Yes    Sleep apnea or symptoms of sleep apnea:  None    Diet:  Regular (no restrictions)    Frequency of exercise:  2-3 days/week    Duration of exercise:  15-30 minutes    Taking medications regularly:  Yes    Medication side effects:  None    Additional concerns today:  No      Sinus infection recently treated with Augmentin- improved but not resolved now worsening.  Menses better on OCPs- no side effects noted.  Fatigue,  libido for years but worsening.       Social History     Tobacco Use     Smoking status: Never     Passive exposure: Never     Smokeless tobacco: Never   Substance Use Topics     Alcohol use: No     Comment: Alcoholic Drinks/day: 1-2 times a year             2023    12:39 PM   Alcohol Use   Prescreen: >3 drinks/day or >7 drinks/week? No     Reviewed orders with patient.  Reviewed health maintenance and updated orders accordingly - Yes  Lab work is in process    Breast Cancer Screenin/27/2022     1:42 PM   Breast CA Risk Assessment (FHS-7)   Do you have a family history of breast, colon, or ovarian cancer? No / Unknown       click delete button to remove this line now  Mammogram Screening - Offered annual screening and updated Health Maintenance for mutual plan based on risk factor consideration  Pertinent mammograms are reviewed under the imaging tab.    History of abnormal Pap smear: NO - age 30-65 PAP every 5 years with negative HPV co-testing recommended      Latest Ref Rng & Units 3/1/2021     4:40 PM 3/26/2019     9:07 AM 8/3/2018     5:22 PM   PAP / HPV   PAP Negative for squamous intraepithelial lesion or malignancy. Negative for squamous  intraepithelial lesion or malignancy  Electronically signed by Riri Pa CT (ASCP) on 3/9/2021 at  4:44 PM    Negative for squamous intraepithelial lesion or malignancy  Electronically signed by Margarita Gore CT (ASCP) on 4/3/2019 at  8:58 AM    Negative for squamous intraepithelial lesion or malignancy  Electronically signed by Margarita Gore CT (ASCP) on 8/10/2018 at  7:33 AM      HPV 16 DNA NEG Negative  Negative  Negative    HPV 18 DNA NEG Negative  Negative  Negative    Other HR HPV NEG Negative  Negative  Negative      Reviewed and updated as needed this visit by clinical staff   Tobacco  Allergies  Meds              Reviewed and updated as needed this visit by Provider                 Past Medical History:   Diagnosis Date     De Quervain's syndrome (tenosynovitis) 09/01/2019    bilateral     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     low egg count, took 5 years to get pregnant with her first     Kidney stone 05/24/2022    dx with uti and 7 mm stone, stent placed at [facility]     Migraine      Normal delivery 11/01/2016     Normal delivery 02/03/2019        Review of Systems   Constitutional:  Negative for chills and fever.   HENT:  Positive for congestion. Negative for ear pain, hearing loss and sore throat.    Eyes:  Negative for pain and visual disturbance.   Respiratory:  Negative for cough and shortness of breath.    Cardiovascular:  Negative for chest pain, palpitations and peripheral edema.   Gastrointestinal:  Negative for abdominal pain, constipation, diarrhea, heartburn, hematochezia and nausea.   Breasts:  Negative for tenderness, breast mass and discharge.   Genitourinary:  Negative for dysuria, frequency, genital sores, hematuria, pelvic pain, urgency, vaginal bleeding and vaginal discharge.   Musculoskeletal:  Negative for arthralgias, joint swelling and myalgias.   Skin:  Negative for rash.   Neurological:  Positive for dizziness and headaches. Negative for  "weakness and paresthesias.   Psychiatric/Behavioral:  Negative for mood changes. The patient is not nervous/anxious.         OBJECTIVE:   /70 (BP Location: Left arm, Patient Position: Sitting, Cuff Size: Adult Regular)   Pulse 88   Temp 97.8  F (36.6  C) (Oral)   Resp 12   Ht 1.775 m (5' 9.88\")   Wt 68 kg (150 lb)   LMP 05/29/2023 (Approximate)   SpO2 100%   BMI 21.60 kg/m    Physical Exam  GENERAL: healthy, alert and no distress  EYES: Eyes grossly normal to inspection, PERRL and conjunctivae and sclerae normal  HENT: normal cephalic/atraumatic, ear canals and TM's normal, nasal mucosa edematous , rhinorrhea yellow and green, oropharynx clear, and oral mucous membranes moist  NECK: no adenopathy, no asymmetry, masses, or scars and thyroid normal to palpation  RESP: lungs clear to auscultation - no rales, rhonchi or wheezes  BREAST: normal without masses, tenderness or nipple discharge and no palpable axillary masses or adenopathy  CV: regular rate and rhythm, normal S1 S2, no S3 or S4, no murmur, click or rub, no peripheral edema and peripheral pulses strong  ABDOMEN: soft, nontender, no hepatosplenomegaly, no masses and bowel sounds normal  MS: no gross musculoskeletal defects noted, no edema  SKIN: no suspicious lesions or rashes  NEURO: Normal strength and tone, mentation intact and speech normal  PSYCH: mentation appears normal, affect normal/bright    Diagnostic Test Results:  Labs reviewed in Epic    ASSESSMENT/PLAN:       ICD-10-CM    1. Routine general medical examination at a health care facility  Z00.00       2. Irregular menses  N92.6 levonorgestrel-ethinyl estradiol (SEASONALE) 0.15-0.03 MG tablet      3. Other migraine without status migrainosus, not intractable  G43.809 SUMAtriptan (IMITREX) 50 MG tablet      4. Acute bacterial sinusitis  J01.90 amoxicillin-clavulanate (AUGMENTIN) 875-125 MG tablet    B96.89       5. Fatigue, unspecified type  R53.83 CBC with platelets     TSH with free " T4 reflex     Vitamin B12     Comprehensive metabolic panel (BMP + Alb, Alk Phos, ALT, AST, Total. Bili, TP)     Vitamin D deficiency screening     CBC with platelets     TSH with free T4 reflex     Vitamin B12     Comprehensive metabolic panel (BMP + Alb, Alk Phos, ALT, AST, Total. Bili, TP)     Vitamin D deficiency screening      6. Decreased libido  R68.82           Patient has been advised of split billing requirements and indicates understanding: Yes      COUNSELING:  Reviewed preventive health counseling, as reflected in patient instructions        She reports that she has never smoked. She has never been exposed to tobacco smoke. She has never used smokeless tobacco.          Rosemarie Livingston CNP  [facility]   Home

## 2023-09-28 ENCOUNTER — MYC MEDICAL ADVICE (OUTPATIENT)
Dept: FAMILY MEDICINE | Facility: CLINIC | Age: 43
End: 2023-09-28
Payer: COMMERCIAL

## 2023-09-28 DIAGNOSIS — G43.809 OTHER MIGRAINE WITHOUT STATUS MIGRAINOSUS, NOT INTRACTABLE: ICD-10-CM

## 2023-09-28 RX ORDER — SUMATRIPTAN 50 MG/1
50 TABLET, FILM COATED ORAL
Qty: 30 TABLET | Refills: 5 | Status: SHIPPED | OUTPATIENT
Start: 2023-09-28 | End: 2024-08-08

## 2023-09-28 NOTE — TELEPHONE ENCOUNTER
Pt My chart message pt has no refills left on her medicaton  Please adv and sign prescription    Thank you

## 2023-10-31 ENCOUNTER — ALLIED HEALTH/NURSE VISIT (OUTPATIENT)
Dept: FAMILY MEDICINE | Facility: CLINIC | Age: 43
End: 2023-10-31
Payer: COMMERCIAL

## 2023-10-31 DIAGNOSIS — E53.8 VITAMIN B12 DEFICIENCY (NON ANEMIC): Primary | ICD-10-CM

## 2023-10-31 PROCEDURE — 96372 THER/PROPH/DIAG INJ SC/IM: CPT | Performed by: NURSE PRACTITIONER

## 2023-10-31 PROCEDURE — 99207 PR NO CHARGE NURSE ONLY: CPT

## 2023-10-31 RX ADMIN — CYANOCOBALAMIN 1000 MCG: 1000 INJECTION, SOLUTION INTRAMUSCULAR; SUBCUTANEOUS at 16:05

## 2023-10-31 NOTE — PROGRESS NOTES
Clinic Administered Medication Documentation      Injectable Medication Documentation    Is there an active order (written within the past 365 days, with administrations remaining, not ) in the chart? Yes.     Patient was given Cyanocobalamin (B-12). Prior to medication administration, verified patient's identity using patient s name and date of birth. Please see MAR and medication order for additional information. Patient instructed to remain in clinic for 15 minutes and report any adverse reaction to staff immediately.    Vial/Syringe: Single dose vial. Was entire vial of medication used? Yes  Was this medication supplied by the patient? No  Is this a medication the patient will need to receive again? No - not necessary to check for refills remaining.

## 2023-12-01 ENCOUNTER — ALLIED HEALTH/NURSE VISIT (OUTPATIENT)
Dept: FAMILY MEDICINE | Facility: CLINIC | Age: 43
End: 2023-12-01
Payer: COMMERCIAL

## 2023-12-01 DIAGNOSIS — E53.8 VITAMIN B12 DEFICIENCY (NON ANEMIC): Primary | ICD-10-CM

## 2023-12-01 PROCEDURE — 99207 PR NO CHARGE NURSE ONLY: CPT

## 2023-12-01 PROCEDURE — 96372 THER/PROPH/DIAG INJ SC/IM: CPT | Performed by: NURSE PRACTITIONER

## 2023-12-01 RX ADMIN — CYANOCOBALAMIN 1000 MCG: 1000 INJECTION, SOLUTION INTRAMUSCULAR; SUBCUTANEOUS at 15:32

## 2023-12-01 NOTE — PROGRESS NOTES
Clinic Administered Medication Documentation      Injectable Medication Documentation    Is there an active order (written within the past 365 days, with administrations remaining, not ) in the chart? Yes.     Patient was given Cyanocobalamin (B-12). Prior to medication administration, verified patient's identity using patient s name and date of birth. Please see MAR and medication order for additional information. Patient instructed to remain in clinic for 15 minutes and report any adverse reaction to staff immediately.    Vial/Syringe: Single dose vial. Was entire vial of medication used? Yes  Was this medication supplied by the patient? No  Is this a medication the patient will need to receive again? Yes. Verified that the patient has refills remaining in their prescription.    Judy Londono CMA.

## 2023-12-16 ENCOUNTER — E-VISIT (OUTPATIENT)
Dept: URGENT CARE | Facility: CLINIC | Age: 43
End: 2023-12-16
Payer: COMMERCIAL

## 2023-12-16 DIAGNOSIS — J01.90 ACUTE SINUSITIS WITH SYMPTOMS > 10 DAYS: Primary | ICD-10-CM

## 2023-12-16 PROCEDURE — 99421 OL DIG E/M SVC 5-10 MIN: CPT | Performed by: FAMILY MEDICINE

## 2023-12-16 RX ORDER — AZELASTINE 1 MG/ML
2 SPRAY, METERED NASAL 2 TIMES DAILY
Qty: 30 ML | Refills: 0 | Status: SHIPPED | OUTPATIENT
Start: 2023-12-16 | End: 2024-02-26

## 2023-12-16 RX ORDER — BENZONATATE 100 MG/1
100 CAPSULE ORAL 3 TIMES DAILY PRN
Qty: 30 CAPSULE | Refills: 0 | Status: SHIPPED | OUTPATIENT
Start: 2023-12-16 | End: 2024-02-26

## 2023-12-16 RX ORDER — GUAIFENESIN 1200 MG/1
1200 TABLET, EXTENDED RELEASE ORAL 2 TIMES DAILY
Qty: 60 TABLET | Refills: 0 | Status: SHIPPED | OUTPATIENT
Start: 2023-12-16 | End: 2024-02-26

## 2023-12-17 NOTE — PATIENT INSTRUCTIONS
You may want to try a nasal lavage (also known as nasal irrigation). You can find over-the-counter products, such as Neti-Pot, at retail locations or make your own at home. Instructions for homemade nasal lavage and more information on the process are available online at http://www.aafp.org/afp/2009/1115/p1121.html.

## 2023-12-29 ENCOUNTER — ALLIED HEALTH/NURSE VISIT (OUTPATIENT)
Dept: FAMILY MEDICINE | Facility: CLINIC | Age: 43
End: 2023-12-29
Payer: COMMERCIAL

## 2023-12-29 DIAGNOSIS — E53.8 VITAMIN B12 DEFICIENCY (NON ANEMIC): Primary | ICD-10-CM

## 2023-12-29 PROCEDURE — 99207 PR NO CHARGE NURSE ONLY: CPT

## 2023-12-29 PROCEDURE — 96372 THER/PROPH/DIAG INJ SC/IM: CPT | Performed by: NURSE PRACTITIONER

## 2023-12-29 RX ADMIN — CYANOCOBALAMIN 1000 MCG: 1000 INJECTION, SOLUTION INTRAMUSCULAR; SUBCUTANEOUS at 15:31

## 2023-12-29 NOTE — PROGRESS NOTES
Clinic Administered Medication Documentation      Injectable Medication Documentation    Is there an active order (written within the past 365 days, with administrations remaining, not ) in the chart? Yes.     Patient was given Cyanocobalamin (B-12). Prior to medication administration, verified patient's identity using patient s name and date of birth. Please see MAR and medication order for additional information. Patient instructed to remain in clinic for 15 minutes and report any adverse reaction to staff immediately.    Vial/Syringe: Single dose vial. Was entire vial of medication used? Yes  Was this medication supplied by the patient? No  Is this a medication the patient will need to receive again? Yes. Verified that the patient has refills remaining in their prescription.    Sandie Azul, CMA

## 2024-02-02 ENCOUNTER — ALLIED HEALTH/NURSE VISIT (OUTPATIENT)
Dept: FAMILY MEDICINE | Facility: CLINIC | Age: 44
End: 2024-02-02
Payer: COMMERCIAL

## 2024-02-02 DIAGNOSIS — E53.8 VITAMIN B12 DEFICIENCY (NON ANEMIC): Primary | ICD-10-CM

## 2024-02-02 PROCEDURE — 99207 PR NO CHARGE NURSE ONLY: CPT

## 2024-02-02 PROCEDURE — 96372 THER/PROPH/DIAG INJ SC/IM: CPT | Performed by: NURSE PRACTITIONER

## 2024-02-02 RX ADMIN — CYANOCOBALAMIN 1000 MCG: 1000 INJECTION, SOLUTION INTRAMUSCULAR; SUBCUTANEOUS at 15:26

## 2024-02-26 ENCOUNTER — OFFICE VISIT (OUTPATIENT)
Dept: FAMILY MEDICINE | Facility: CLINIC | Age: 44
End: 2024-02-26
Payer: COMMERCIAL

## 2024-02-26 VITALS
HEIGHT: 70 IN | DIASTOLIC BLOOD PRESSURE: 64 MMHG | BODY MASS INDEX: 21.39 KG/M2 | RESPIRATION RATE: 14 BRPM | SYSTOLIC BLOOD PRESSURE: 116 MMHG | OXYGEN SATURATION: 98 % | WEIGHT: 149.4 LBS | TEMPERATURE: 98.4 F | HEART RATE: 84 BPM

## 2024-02-26 DIAGNOSIS — R73.09 ELEVATED GLUCOSE: ICD-10-CM

## 2024-02-26 DIAGNOSIS — E53.8 VITAMIN B12 DEFICIENCY (NON ANEMIC): Primary | ICD-10-CM

## 2024-02-26 LAB
ERYTHROCYTE [DISTWIDTH] IN BLOOD BY AUTOMATED COUNT: 12.6 % (ref 10–15)
HBA1C MFR BLD: 5.3 % (ref 0–5.6)
HCT VFR BLD AUTO: 37.9 % (ref 35–47)
HGB BLD-MCNC: 12.9 G/DL (ref 11.7–15.7)
MCH RBC QN AUTO: 31.2 PG (ref 26.5–33)
MCHC RBC AUTO-ENTMCNC: 34 G/DL (ref 31.5–36.5)
MCV RBC AUTO: 92 FL (ref 78–100)
PLATELET # BLD AUTO: 328 10E3/UL (ref 150–450)
RBC # BLD AUTO: 4.13 10E6/UL (ref 3.8–5.2)
VIT B12 SERPL-MCNC: 356 PG/ML (ref 232–1245)
WBC # BLD AUTO: 6.5 10E3/UL (ref 4–11)

## 2024-02-26 PROCEDURE — 96372 THER/PROPH/DIAG INJ SC/IM: CPT | Performed by: NURSE PRACTITIONER

## 2024-02-26 PROCEDURE — 99213 OFFICE O/P EST LOW 20 MIN: CPT | Mod: 25 | Performed by: NURSE PRACTITIONER

## 2024-02-26 PROCEDURE — 83036 HEMOGLOBIN GLYCOSYLATED A1C: CPT | Performed by: NURSE PRACTITIONER

## 2024-02-26 PROCEDURE — 82607 VITAMIN B-12: CPT | Performed by: NURSE PRACTITIONER

## 2024-02-26 PROCEDURE — 85027 COMPLETE CBC AUTOMATED: CPT | Performed by: NURSE PRACTITIONER

## 2024-02-26 PROCEDURE — 36415 COLL VENOUS BLD VENIPUNCTURE: CPT | Performed by: NURSE PRACTITIONER

## 2024-02-26 RX ORDER — FLUTICASONE PROPIONATE 50 MCG
2 SPRAY, SUSPENSION (ML) NASAL DAILY
COMMUNITY
Start: 2023-10-18 | End: 2024-02-26

## 2024-02-26 RX ADMIN — CYANOCOBALAMIN 1000 MCG: 1000 INJECTION, SOLUTION INTRAMUSCULAR; SUBCUTANEOUS at 13:09

## 2024-02-26 ASSESSMENT — PAIN SCALES - GENERAL: PAINLEVEL: NO PAIN (0)

## 2024-02-26 NOTE — PROGRESS NOTES
"  Rai Gaffney is a 43 year old, presenting for the following health issues:  Recheck Medication (Follow up to B12, has had for 6 months)      2/26/2024    12:37 PM   Additional Questions   Roomed by  LPN     History of Present Illness       Reason for visit:  B12 follow up    She eats 2-3 servings of fruits and vegetables daily.She consumes 1 sweetened beverage(s) daily.She exercises with enough effort to increase her heart rate 10 to 19 minutes per day.  She exercises with enough effort to increase her heart rate 3 or less days per week.   She is taking medications regularly.     Here for follow-up B12 deficiency.  Has had 6 months of B12 injections.  Has noticed modest improvement in fatigue. Last glucose was a little high.       Review of Systems  Constitutional, HEENT, cardiovascular, pulmonary, gi and gu systems are negative, except as otherwise noted.      Objective    /64 (BP Location: Left arm, Patient Position: Sitting, Cuff Size: Adult Regular)   Pulse 84   Temp 98.4  F (36.9  C) (Oral)   Resp 14   Ht 1.772 m (5' 9.75\")   Wt 67.8 kg (149 lb 6.4 oz)   LMP 11/27/2023   SpO2 98%   Breastfeeding No   BMI 21.59 kg/m    Body mass index is 21.59 kg/m .  Physical Exam   GENERAL: alert and no distress  RESP: lungs clear to auscultation - no rales, rhonchi or wheezes  CV: regular rate and rhythm, normal S1 S2, no S3 or S4, no murmur, click or rub, no peripheral edema   MS: no gross musculoskeletal defects noted, no edema  PSYCH: mentation appears normal, affect normal/bright    Results for orders placed or performed in visit on 02/26/24 (from the past 24 hour(s))   Hemoglobin A1c   Result Value Ref Range    Hemoglobin A1C 5.3 0.0 - 5.6 %   CBC with platelets   Result Value Ref Range    WBC Count 6.5 4.0 - 11.0 10e3/uL    RBC Count 4.13 3.80 - 5.20 10e6/uL    Hemoglobin 12.9 11.7 - 15.7 g/dL    Hematocrit 37.9 35.0 - 47.0 %    MCV 92 78 - 100 fL    MCH 31.2 26.5 - 33.0 pg    MCHC 34.0 31.5 - " 36.5 g/dL    RDW 12.6 10.0 - 15.0 %    Platelet Count 328 150 - 450 10e3/uL         A/P:  1. Vitamin B12 deficiency (non anemic)  - Vitamin B12; Future  - CBC with platelets; Future  - Vitamin B12  - CBC with platelets    2. Elevated glucose  - Hemoglobin A1c; Future  - Hemoglobin A1c    Signed Electronically by: Rosemarie Livingston CNP

## 2024-04-09 ENCOUNTER — OFFICE VISIT (OUTPATIENT)
Dept: FAMILY MEDICINE | Facility: CLINIC | Age: 44
End: 2024-04-09
Payer: COMMERCIAL

## 2024-04-09 VITALS
RESPIRATION RATE: 16 BRPM | OXYGEN SATURATION: 100 % | HEART RATE: 100 BPM | TEMPERATURE: 98 F | DIASTOLIC BLOOD PRESSURE: 80 MMHG | SYSTOLIC BLOOD PRESSURE: 134 MMHG

## 2024-04-09 DIAGNOSIS — R10.84 ABDOMINAL PAIN, GENERALIZED: ICD-10-CM

## 2024-04-09 DIAGNOSIS — R10.31 ABDOMINAL PAIN, RIGHT LOWER QUADRANT: Primary | ICD-10-CM

## 2024-04-09 LAB
ALBUMIN UR-MCNC: NEGATIVE MG/DL
APPEARANCE UR: CLEAR
BACTERIA #/AREA URNS HPF: ABNORMAL /HPF
BILIRUB UR QL STRIP: NEGATIVE
COLOR UR AUTO: YELLOW
GLUCOSE UR STRIP-MCNC: NEGATIVE MG/DL
HGB UR QL STRIP: ABNORMAL
KETONES UR STRIP-MCNC: NEGATIVE MG/DL
LEUKOCYTE ESTERASE UR QL STRIP: ABNORMAL
NITRATE UR QL: NEGATIVE
PH UR STRIP: 7 [PH] (ref 5–8)
RBC #/AREA URNS AUTO: ABNORMAL /HPF
SP GR UR STRIP: 1.02 (ref 1–1.03)
SQUAMOUS #/AREA URNS AUTO: ABNORMAL /LPF
UROBILINOGEN UR STRIP-ACNC: 0.2 E.U./DL
WBC #/AREA URNS AUTO: ABNORMAL /HPF

## 2024-04-09 PROCEDURE — 87086 URINE CULTURE/COLONY COUNT: CPT | Performed by: PHYSICIAN ASSISTANT

## 2024-04-09 PROCEDURE — 81001 URINALYSIS AUTO W/SCOPE: CPT | Performed by: PHYSICIAN ASSISTANT

## 2024-04-09 PROCEDURE — 99214 OFFICE O/P EST MOD 30 MIN: CPT | Performed by: PHYSICIAN ASSISTANT

## 2024-04-09 NOTE — PATIENT INSTRUCTIONS
Continue to monitor your symptoms until resolution  Urinalysis did not show urinary tract infection or microscopic blood in the urine that would be consistent with a kidney stone.  There was tenderness on the right lower quadrant over the ovary area may be involving an ovarian cyst.  Recommendation is for ultrasound for visualization and testing of the right ovary and right lower quadrant abdominal pain.  If you do not go to the emergency room tonight for evaluation I would recommend follow-up with family practice doctor to reevaluate and image the right lower quadrant with ultrasound.   Return to the emergency room between today's appointment and follow-up with family practice if you develop fever, nausea vomiting, increased pain or if new symptoms or concerns arise.

## 2024-04-09 NOTE — PROGRESS NOTES
Patient presents with:  Abdominal Pain: Has had lower abd pain for several weeks used OTC UTI didn't help tried things for constipation last 2 days pain more constant and has chills      Clinical Decision Making:  I had a long discussion with the patient regarding the urine test is negative for infection.  Urine is sent for culture which we will double check for infection.  Additionally there are no gross hematuria or microscopic hematuria noted on examination today.  Advised patient that 20% of the time there can be a kidney stone without hematuria.  Additionally other etiologies were considered to include ectopic pregnancy which patient states that she has 's vasectomy and declines pregnancy testing.  She has not had a prior history of ovarian cysts.  Recommendation was for evaluation at the emergency room to have ultrasound of the right lower quadrant looking at the ovary.  There was not significant amount of pain consistent with ovarian torsion although that would still be on the differential diagnoses.  Patient declined going to the ER however that was my recommendation states that she will contact her primary care provider for evaluation of her right lower quadrant/pelvic pain.  Questions were to patient satisfaction before discharge.      30 min spent on the date of the encounter in chart review, patient visit, review of tests, documentation and/ordiscussion with other providers about the issues documented above.        ICD-10-CM    1. Abdominal pain, right lower quadrant  R10.31 UA Macroscopic with reflex to Microscopic and Culture - Clinic Collect     UA Microscopic with Reflex to Culture     Urine Culture Aerobic Bacterial     PRIMARY CARE FOLLOW-UP SCHEDULING      2. Abdominal pain, generalized  R10.84           Patient Instructions   Continue to monitor your symptoms until resolution  Urinalysis did not show urinary tract infection or microscopic blood in the urine that would be consistent with a  kidney stone.  There was tenderness on the right lower quadrant over the ovary area may be involving an ovarian cyst.  Recommendation is for ultrasound for visualization and testing of the right ovary and right lower quadrant abdominal pain.  If you do not go to the emergency room tonight for evaluation I would recommend follow-up with family practice doctor to reevaluate and image the right lower quadrant with ultrasound.   Return to the emergency room between today's appointment and follow-up with family practice if you develop fever, nausea vomiting, increased pain or if new symptoms or concerns arise.        HPI:  Yeimy Blackmon is a 43 year old female who presents today for right lower quadrant abdominal and pelvic pain.  Patient states that she had chills at 3 PM and rested.  He has not had urinary symptoms other than urinary frequency has not had hesitancy urgency or dysuria no gross hematuria.  However, the patient states that she does have pressure over the suprapubic area.  She has had a prior history of urinary tract infection associated with a kidney stone she is concerned that this could be developing.  Patient does not have a history of ovarian cysts or vaginal discharge no vulvar or vaginal lesions or itching and patient declines STD screening since she is  and monogamous and does not have concern for STDs.  Additionally her  has had a vasectomy and she denies pregnancy and declines screening for pregnancy.  Patient describes the right lower quadrant abdominal pain as being a 6 out of 10 and constant.  Her last menstrual period was on 3/7/2024.  There are no other associated symptoms to include nausea vomiting loss of appetite constipation or diarrhea.  No treatment was tried for this at home.    History obtained from chart review and the patient.    Problem List:  2023-08: Ureterolithiasis  2023-03: History of pyelonephritis  2023-03: History of kidney stones  2022-08: Mass of soft  tissue of foot  2022-08: Achilles tendinitis of right lower extremity  2022-05: Pyelonephritis  2022-05: Kidney stone  Migraine Headache      Past Medical History:   Diagnosis Date    De Quervain's syndrome (tenosynovitis) 09/01/2019    bilateral    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     low egg count, took 5 years to get pregnant with her first    Kidney stone 05/24/2022    dx with uti and 7 mm stone, stent placed at [facility]    Migraine     Normal delivery 11/01/2016    Normal delivery 02/03/2019       Social History     Tobacco Use    Smoking status: Never     Passive exposure: Never    Smokeless tobacco: Never   Substance Use Topics    Alcohol use: No     Comment: Alcoholic Drinks/day: 1-2 times a year       Review of Systems  As above in HPI otherwise negative.    Vitals:    04/09/24 1730   BP: 134/80   Pulse: 100   Resp: 16   Temp: 98  F (36.7  C)   TempSrc: Oral   SpO2: 100%       General: Patient is resting comfortably no acute distress is afebrile  HEENT: Head is normocephalic atraumatic   eyes are PERRL EOMI sclera anicteric   TMs are clear bilaterally  Throat is with mild pharyngeal wall erythema and no exudate  No cervical lymphadenopathy present  LUNGS: Clear to auscultation bilaterally  HEART: Regular rate and rhythm  Abdomen:  Left lower quadrant is without tenderness there is mild suprapubic tenderness to palpation but there is more tenderness over the right lower quadrant just off of midline.  But this is not at McBurney's point.  Patient does not have rebound or guarding.  No CVA tenderness to percussion.  Suprapubic tenderness is mild but is not have induration.  Skin: Without rash non-diaphoretic    Physical Exam      Labs:  Results for orders placed or performed in visit on 04/09/24   UA Macroscopic with reflex to Microscopic and Culture - Clinic Collect     Status: Abnormal    Specimen: Urine, Clean Catch   Result Value Ref Range    Color Urine Yellow Colorless, Straw, Light Yellow,  Yellow    Appearance Urine Clear Clear    Glucose Urine Negative Negative mg/dL    Bilirubin Urine Negative Negative    Ketones Urine Negative Negative mg/dL    Specific Gravity Urine 1.020 1.005 - 1.030    Blood Urine Trace (A) Negative    pH Urine 7.0 5.0 - 8.0    Protein Albumin Urine Negative Negative mg/dL    Urobilinogen Urine 0.2 0.2, 1.0 E.U./dL    Nitrite Urine Negative Negative    Leukocyte Esterase Urine Small (A) Negative   UA Microscopic with Reflex to Culture     Status: Abnormal   Result Value Ref Range    Bacteria Urine Few (A) None Seen /HPF    RBC Urine 0-2 0-2 /HPF /HPF    WBC Urine 0-5 0-5 /HPF /HPF    Squamous Epithelials Urine Few (A) None Seen /LPF    Narrative    Urine Culture not indicated     Urine culture is pending.    At the end of the encounter, I discussed results, diagnosis, medications. Discussed red flags for immediate return to clinic/ER, as well as indications for follow up if no improvement. Patient understood and agreed to plan. Patient was stable for discharge.

## 2024-04-11 LAB — BACTERIA UR CULT: NORMAL

## 2024-04-19 ENCOUNTER — ALLIED HEALTH/NURSE VISIT (OUTPATIENT)
Dept: FAMILY MEDICINE | Facility: CLINIC | Age: 44
End: 2024-04-19
Payer: COMMERCIAL

## 2024-04-19 DIAGNOSIS — E53.8 B12 DEFICIENCY: Primary | ICD-10-CM

## 2024-04-19 PROCEDURE — 99207 PR NO CHARGE NURSE ONLY: CPT

## 2024-04-19 PROCEDURE — 96372 THER/PROPH/DIAG INJ SC/IM: CPT | Performed by: NURSE PRACTITIONER

## 2024-04-19 RX ADMIN — CYANOCOBALAMIN 1000 MCG: 1000 INJECTION, SOLUTION INTRAMUSCULAR; SUBCUTANEOUS at 11:47

## 2024-05-28 ENCOUNTER — ALLIED HEALTH/NURSE VISIT (OUTPATIENT)
Dept: FAMILY MEDICINE | Facility: CLINIC | Age: 44
End: 2024-05-28
Payer: COMMERCIAL

## 2024-05-28 DIAGNOSIS — E53.8 B12 DEFICIENCY: Primary | ICD-10-CM

## 2024-05-28 PROCEDURE — 99207 PR NO CHARGE NURSE ONLY: CPT

## 2024-05-28 PROCEDURE — 96372 THER/PROPH/DIAG INJ SC/IM: CPT | Performed by: NURSE PRACTITIONER

## 2024-05-28 RX ADMIN — CYANOCOBALAMIN 1000 MCG: 1000 INJECTION, SOLUTION INTRAMUSCULAR; SUBCUTANEOUS at 08:09

## 2024-06-10 NOTE — ADDENDUM NOTE
Addended by: FRANCESCO EMMANUEL on: 3/26/2023 02:03 PM     Modules accepted: Orders     Left message at [phone] to call us back after 8am tomorrow

## 2024-07-02 ENCOUNTER — ALLIED HEALTH/NURSE VISIT (OUTPATIENT)
Dept: FAMILY MEDICINE | Facility: CLINIC | Age: 44
End: 2024-07-02
Payer: COMMERCIAL

## 2024-07-02 DIAGNOSIS — E53.8 VITAMIN B12 DEFICIENCY (NON ANEMIC): Primary | ICD-10-CM

## 2024-07-02 PROCEDURE — 99207 PR NO CHARGE NURSE ONLY: CPT

## 2024-07-02 PROCEDURE — 96372 THER/PROPH/DIAG INJ SC/IM: CPT | Performed by: NURSE PRACTITIONER

## 2024-07-02 RX ADMIN — CYANOCOBALAMIN 1000 MCG: 1000 INJECTION, SOLUTION INTRAMUSCULAR; SUBCUTANEOUS at 12:57

## 2024-07-02 NOTE — PROGRESS NOTES
Clinic Administered Medication Documentation      Injectable Medication Documentation    Is there an active order (written within the past 365 days, with administrations remaining, not ) in the chart? Yes.     Patient was given Cyanocobalamin (B-12). Prior to medication administration, verified patient's identity using patient s name and date of birth. Please see MAR and medication order for additional information. Patient instructed to report any adverse reaction to staff immediately.    Vial/Syringe: Single dose vial. Was entire vial of medication used? Yes  Was this medication supplied by the patient? No  Is this a medication the patient will need to receive again? Yes. Verified that the patient has refills remaining in their prescription.

## 2024-07-23 ENCOUNTER — E-VISIT (OUTPATIENT)
Dept: URGENT CARE | Facility: CLINIC | Age: 44
End: 2024-07-23
Payer: COMMERCIAL

## 2024-07-23 ENCOUNTER — LAB (OUTPATIENT)
Dept: LAB | Facility: CLINIC | Age: 44
End: 2024-07-23
Payer: COMMERCIAL

## 2024-07-23 DIAGNOSIS — R30.0 DYSURIA: Primary | ICD-10-CM

## 2024-07-23 DIAGNOSIS — R30.0 DYSURIA: ICD-10-CM

## 2024-07-23 LAB
ALBUMIN UR-MCNC: NEGATIVE MG/DL
APPEARANCE UR: CLEAR
BACTERIA #/AREA URNS HPF: ABNORMAL /HPF
BILIRUB UR QL STRIP: NEGATIVE
CLUE CELLS: ABNORMAL
COLOR UR AUTO: YELLOW
GLUCOSE UR STRIP-MCNC: NEGATIVE MG/DL
HGB UR QL STRIP: ABNORMAL
KETONES UR STRIP-MCNC: NEGATIVE MG/DL
LEUKOCYTE ESTERASE UR QL STRIP: ABNORMAL
NITRATE UR QL: NEGATIVE
PH UR STRIP: 6 [PH] (ref 5–8)
RBC #/AREA URNS AUTO: ABNORMAL /HPF
SP GR UR STRIP: 1.01 (ref 1–1.03)
SQUAMOUS #/AREA URNS AUTO: ABNORMAL /LPF
TRICHOMONAS, WET PREP: ABNORMAL
UROBILINOGEN UR STRIP-ACNC: 0.2 E.U./DL
WBC #/AREA URNS AUTO: ABNORMAL /HPF
WBC'S/HIGH POWER FIELD, WET PREP: ABNORMAL
YEAST, WET PREP: ABNORMAL

## 2024-07-23 PROCEDURE — 87210 SMEAR WET MOUNT SALINE/INK: CPT

## 2024-07-23 PROCEDURE — 99421 OL DIG E/M SVC 5-10 MIN: CPT | Performed by: PHYSICIAN ASSISTANT

## 2024-07-23 PROCEDURE — 81001 URINALYSIS AUTO W/SCOPE: CPT

## 2024-07-23 NOTE — PATIENT INSTRUCTIONS
Dear Yeimy Blackmon,     After reviewing your responses, I would like you to come in for a urine test to make sure we treat you correctly. This urine test is to evaluate you for a possible urinary tract infection, and should be scheduled for today or tomorrow. Schedule a Lab Only appointment here.     Lab appointments are not available at most locations on the weekends. If no Lab Only appointment is available, you should be seen in any of our convenient Walk-in or Urgent Care Centers, which can be found on our website here.     You will receive instructions with your results in [facility] once they are available.     If your symptoms worsen, you develop pain in your back or stomach, develop fevers, or are not improving in 5 days, please contact your primary care provider for an appointment or visit a Walk-in or Urgent Care Center to be seen.     Thanks again for choosing us as your health care partner,     Shahla Ace PA-C

## 2024-07-30 ENCOUNTER — ALLIED HEALTH/NURSE VISIT (OUTPATIENT)
Dept: FAMILY MEDICINE | Facility: CLINIC | Age: 44
End: 2024-07-30
Payer: COMMERCIAL

## 2024-07-30 DIAGNOSIS — Z23 ENCOUNTER FOR IMMUNIZATION: Primary | ICD-10-CM

## 2024-07-30 PROCEDURE — 96372 THER/PROPH/DIAG INJ SC/IM: CPT | Performed by: NURSE PRACTITIONER

## 2024-07-30 PROCEDURE — 99207 PR NO CHARGE NURSE ONLY: CPT

## 2024-07-30 RX ADMIN — CYANOCOBALAMIN 1000 MCG: 1000 INJECTION, SOLUTION INTRAMUSCULAR; SUBCUTANEOUS at 09:53

## 2024-08-01 ENCOUNTER — NURSE TRIAGE (OUTPATIENT)
Dept: FAMILY MEDICINE | Facility: CLINIC | Age: 44
End: 2024-08-01
Payer: COMMERCIAL

## 2024-08-01 NOTE — TELEPHONE ENCOUNTER
"Patient calling clinic to report concern for high blood pressure readings at home. She hasn't been feeling well for about a week and decided to check her blood pressure. She reports last night it was around 120s/60s, but this morning was 131/80. She has also been watching her heart rate on her watch and it is in the 90s during the day, but comes down to 70s-80s in the evening. She is feeling a lot of sinus pressure all over her face and into her ears. She went to her employee health department yesterday where someone checked her ears as well as \"neuro tests\" which she reports were fine. The person she saw there thought it was an atypical migraine. She said this has happened before where she had \"facial numbness\" and it was because she was due for her B12 injection. She had this done on Tuesday this week hoping it would resolve her symptoms but it has not. She says she has had migraines everyday for the last week and has taken her medication for this with good results, but this is not abnormal for her around her cycle. She denies any other symptoms other than some \"chest tightness\" after eating which she believes is indigestion. She feels lightheaded \"maybe just a little\" but says she just doesn't feel herself. She has tried tension headache medication as well as a nasal spray with little results. She took a sick day from work today to rest. Informed her that it is fine to monitor for now and discussed reasons to call back to clinic. Will route to provider for any further recommendations.  "

## 2024-08-02 ENCOUNTER — HOSPITAL ENCOUNTER (OUTPATIENT)
Facility: CLINIC | Age: 44
Setting detail: OBSERVATION
Discharge: HOME | End: 2024-08-04
Attending: EMERGENCY MEDICINE | Admitting: STUDENT IN AN ORGANIZED HEALTH CARE EDUCATION/TRAINING PROGRAM
Payer: COMMERCIAL

## 2024-08-02 ENCOUNTER — APPOINTMENT (OUTPATIENT)
Dept: RADIOLOGY | Facility: CLINIC | Age: 44
End: 2024-08-02
Attending: EMERGENCY MEDICINE
Payer: COMMERCIAL

## 2024-08-02 DIAGNOSIS — K22.10 ESOPHAGEAL EROSIONS: ICD-10-CM

## 2024-08-02 DIAGNOSIS — G43.009 MIGRAINE WITHOUT AURA AND WITHOUT STATUS MIGRAINOSUS, NOT INTRACTABLE: Primary | ICD-10-CM

## 2024-08-02 DIAGNOSIS — R94.31 ACUTE ELECTROCARDIOGRAM CHANGES: ICD-10-CM

## 2024-08-02 DIAGNOSIS — R07.89 CHEST PRESSURE: ICD-10-CM

## 2024-08-02 PROBLEM — I20.0 UNSTABLE ANGINA (H): Status: ACTIVE | Noted: 2024-08-02

## 2024-08-02 LAB
ANION GAP SERPL CALCULATED.3IONS-SCNC: 10 MMOL/L (ref 7–15)
ATRIAL RATE - MUSE: 121 BPM
BASOPHILS # BLD AUTO: 0 10E3/UL (ref 0–0.2)
BASOPHILS NFR BLD AUTO: 0 %
BUN SERPL-MCNC: 8.4 MG/DL (ref 6–20)
CALCIUM SERPL-MCNC: 10.2 MG/DL (ref 8.8–10.4)
CHLORIDE SERPL-SCNC: 106 MMOL/L (ref 98–107)
CREAT SERPL-MCNC: 0.96 MG/DL (ref 0.51–0.95)
D DIMER PPP FEU-MCNC: <=0.27 UG/ML FEU (ref 0–0.5)
DIASTOLIC BLOOD PRESSURE - MUSE: NORMAL MMHG
EGFRCR SERPLBLD CKD-EPI 2021: 75 ML/MIN/1.73M2
EOSINOPHIL # BLD AUTO: 0 10E3/UL (ref 0–0.7)
EOSINOPHIL NFR BLD AUTO: 0 %
ERYTHROCYTE [DISTWIDTH] IN BLOOD BY AUTOMATED COUNT: 12.3 % (ref 10–15)
GLUCOSE SERPL-MCNC: 109 MG/DL (ref 70–99)
HBA1C MFR BLD: 5.4 %
HCG SERPL QL: NEGATIVE
HCO3 SERPL-SCNC: 24 MMOL/L (ref 22–29)
HCT VFR BLD AUTO: 42.8 % (ref 35–47)
HGB BLD-MCNC: 14.7 G/DL (ref 11.7–15.7)
HOLD SPECIMEN: NORMAL
HOLD SPECIMEN: NORMAL
IMM GRANULOCYTES # BLD: 0 10E3/UL
IMM GRANULOCYTES NFR BLD: 0 %
INTERPRETATION ECG - MUSE: NORMAL
LYMPHOCYTES # BLD AUTO: 1.8 10E3/UL (ref 0.8–5.3)
LYMPHOCYTES NFR BLD AUTO: 19 %
MCH RBC QN AUTO: 30.4 PG (ref 26.5–33)
MCHC RBC AUTO-ENTMCNC: 34.3 G/DL (ref 31.5–36.5)
MCV RBC AUTO: 89 FL (ref 78–100)
MONOCYTES # BLD AUTO: 0.5 10E3/UL (ref 0–1.3)
MONOCYTES NFR BLD AUTO: 6 %
NEUTROPHILS # BLD AUTO: 6.9 10E3/UL (ref 1.6–8.3)
NEUTROPHILS NFR BLD AUTO: 74 %
NRBC # BLD AUTO: 0 10E3/UL
NRBC BLD AUTO-RTO: 0 /100
P AXIS - MUSE: 75 DEGREES
PLATELET # BLD AUTO: 362 10E3/UL (ref 150–450)
POTASSIUM SERPL-SCNC: 4.4 MMOL/L (ref 3.4–5.3)
PR INTERVAL - MUSE: 156 MS
QRS DURATION - MUSE: 108 MS
QT - MUSE: 322 MS
QTC - MUSE: 457 MS
R AXIS - MUSE: 138 DEGREES
RBC # BLD AUTO: 4.83 10E6/UL (ref 3.8–5.2)
SODIUM SERPL-SCNC: 140 MMOL/L (ref 135–145)
SYSTOLIC BLOOD PRESSURE - MUSE: NORMAL MMHG
T AXIS - MUSE: 32 DEGREES
TROPONIN T SERPL HS-MCNC: <6 NG/L
TROPONIN T SERPL HS-MCNC: <6 NG/L
TSH SERPL DL<=0.005 MIU/L-ACNC: 1.15 UIU/ML (ref 0.3–4.2)
VENTRICULAR RATE- MUSE: 121 BPM
WBC # BLD AUTO: 9.3 10E3/UL (ref 4–11)

## 2024-08-02 PROCEDURE — 93005 ELECTROCARDIOGRAM TRACING: CPT | Performed by: STUDENT IN AN ORGANIZED HEALTH CARE EDUCATION/TRAINING PROGRAM

## 2024-08-02 PROCEDURE — 71045 X-RAY EXAM CHEST 1 VIEW: CPT

## 2024-08-02 PROCEDURE — 99285 EMERGENCY DEPT VISIT HI MDM: CPT | Mod: 25

## 2024-08-02 PROCEDURE — 82374 ASSAY BLOOD CARBON DIOXIDE: CPT | Performed by: EMERGENCY MEDICINE

## 2024-08-02 PROCEDURE — 36415 COLL VENOUS BLD VENIPUNCTURE: CPT | Performed by: EMERGENCY MEDICINE

## 2024-08-02 PROCEDURE — 36415 COLL VENOUS BLD VENIPUNCTURE: CPT | Performed by: STUDENT IN AN ORGANIZED HEALTH CARE EDUCATION/TRAINING PROGRAM

## 2024-08-02 PROCEDURE — 99223 1ST HOSP IP/OBS HIGH 75: CPT | Performed by: STUDENT IN AN ORGANIZED HEALTH CARE EDUCATION/TRAINING PROGRAM

## 2024-08-02 PROCEDURE — 258N000003 HC RX IP 258 OP 636: Performed by: STUDENT IN AN ORGANIZED HEALTH CARE EDUCATION/TRAINING PROGRAM

## 2024-08-02 PROCEDURE — G0378 HOSPITAL OBSERVATION PER HR: HCPCS

## 2024-08-02 PROCEDURE — 84484 ASSAY OF TROPONIN QUANT: CPT | Performed by: EMERGENCY MEDICINE

## 2024-08-02 PROCEDURE — 93005 ELECTROCARDIOGRAM TRACING: CPT

## 2024-08-02 PROCEDURE — 84484 ASSAY OF TROPONIN QUANT: CPT | Mod: 91 | Performed by: STUDENT IN AN ORGANIZED HEALTH CARE EDUCATION/TRAINING PROGRAM

## 2024-08-02 PROCEDURE — 93005 ELECTROCARDIOGRAM TRACING: CPT | Mod: 76

## 2024-08-02 PROCEDURE — 96361 HYDRATE IV INFUSION ADD-ON: CPT

## 2024-08-02 PROCEDURE — 96360 HYDRATION IV INFUSION INIT: CPT

## 2024-08-02 PROCEDURE — 84443 ASSAY THYROID STIM HORMONE: CPT | Performed by: STUDENT IN AN ORGANIZED HEALTH CARE EDUCATION/TRAINING PROGRAM

## 2024-08-02 PROCEDURE — 85025 COMPLETE CBC W/AUTO DIFF WBC: CPT | Performed by: EMERGENCY MEDICINE

## 2024-08-02 PROCEDURE — 93005 ELECTROCARDIOGRAM TRACING: CPT | Performed by: EMERGENCY MEDICINE

## 2024-08-02 PROCEDURE — 84703 CHORIONIC GONADOTROPIN ASSAY: CPT | Performed by: EMERGENCY MEDICINE

## 2024-08-02 PROCEDURE — 250N000013 HC RX MED GY IP 250 OP 250 PS 637: Performed by: STUDENT IN AN ORGANIZED HEALTH CARE EDUCATION/TRAINING PROGRAM

## 2024-08-02 PROCEDURE — 250N000013 HC RX MED GY IP 250 OP 250 PS 637: Performed by: EMERGENCY MEDICINE

## 2024-08-02 PROCEDURE — 83036 HEMOGLOBIN GLYCOSYLATED A1C: CPT | Performed by: STUDENT IN AN ORGANIZED HEALTH CARE EDUCATION/TRAINING PROGRAM

## 2024-08-02 PROCEDURE — 85379 FIBRIN DEGRADATION QUANT: CPT | Performed by: EMERGENCY MEDICINE

## 2024-08-02 RX ORDER — MORPHINE SULFATE 4 MG/ML
4 INJECTION, SOLUTION INTRAMUSCULAR; INTRAVENOUS EVERY 30 MIN PRN
Status: DISCONTINUED | OUTPATIENT
Start: 2024-08-02 | End: 2024-08-04 | Stop reason: HOSPADM

## 2024-08-02 RX ORDER — LEVONORGESTREL AND ETHINYL ESTRADIOL 0.15-0.03
1 KIT ORAL ONCE
Status: COMPLETED | OUTPATIENT
Start: 2024-08-02 | End: 2024-08-02

## 2024-08-02 RX ORDER — NITROGLYCERIN 0.4 MG/1
0.4 TABLET SUBLINGUAL EVERY 5 MIN PRN
Status: DISCONTINUED | OUTPATIENT
Start: 2024-08-02 | End: 2024-08-04 | Stop reason: HOSPADM

## 2024-08-02 RX ORDER — PROPRANOLOL HYDROCHLORIDE 10 MG/1
10 TABLET ORAL 2 TIMES DAILY
Status: DISCONTINUED | OUTPATIENT
Start: 2024-08-02 | End: 2024-08-03

## 2024-08-02 RX ORDER — LEVONORGESTREL AND ETHINYL ESTRADIOL 0.15-0.03
1 KIT ORAL DAILY
Status: DISCONTINUED | OUTPATIENT
Start: 2024-08-02 | End: 2024-08-04 | Stop reason: HOSPADM

## 2024-08-02 RX ORDER — MAGNESIUM OXIDE 400 MG/1
400 TABLET ORAL DAILY
Status: DISCONTINUED | OUTPATIENT
Start: 2024-08-02 | End: 2024-08-04 | Stop reason: HOSPADM

## 2024-08-02 RX ORDER — POLYETHYLENE GLYCOL 3350 17 G/17G
17 POWDER, FOR SOLUTION ORAL 2 TIMES DAILY PRN
Status: DISCONTINUED | OUTPATIENT
Start: 2024-08-02 | End: 2024-08-04 | Stop reason: HOSPADM

## 2024-08-02 RX ORDER — ONDANSETRON 2 MG/ML
4 INJECTION INTRAMUSCULAR; INTRAVENOUS EVERY 6 HOURS PRN
Status: DISCONTINUED | OUTPATIENT
Start: 2024-08-02 | End: 2024-08-04 | Stop reason: HOSPADM

## 2024-08-02 RX ORDER — PROPRANOLOL HCL 10 MG
5 TABLET ORAL 2 TIMES DAILY
Status: DISCONTINUED | OUTPATIENT
Start: 2024-08-02 | End: 2024-08-02

## 2024-08-02 RX ORDER — PANTOPRAZOLE SODIUM 40 MG/1
40 TABLET, DELAYED RELEASE ORAL
Status: DISCONTINUED | OUTPATIENT
Start: 2024-08-02 | End: 2024-08-04 | Stop reason: HOSPADM

## 2024-08-02 RX ORDER — MULTIVITAMIN,THERAPEUTIC
1 TABLET ORAL DAILY
COMMUNITY

## 2024-08-02 RX ORDER — NALOXONE HYDROCHLORIDE 0.4 MG/ML
0.4 INJECTION, SOLUTION INTRAMUSCULAR; INTRAVENOUS; SUBCUTANEOUS
Status: DISCONTINUED | OUTPATIENT
Start: 2024-08-02 | End: 2024-08-04 | Stop reason: HOSPADM

## 2024-08-02 RX ORDER — GUAIFENESIN 200 MG/10ML
200 LIQUID ORAL EVERY 4 HOURS PRN
Status: DISCONTINUED | OUTPATIENT
Start: 2024-08-02 | End: 2024-08-04 | Stop reason: HOSPADM

## 2024-08-02 RX ORDER — LEVONORGESTREL AND ETHINYL ESTRADIOL 0.15-0.03
1 KIT ORAL DAILY
Status: DISCONTINUED | OUTPATIENT
Start: 2024-08-02 | End: 2024-08-02

## 2024-08-02 RX ORDER — HEPARIN SODIUM 10000 [USP'U]/100ML
0-5000 INJECTION, SOLUTION INTRAVENOUS CONTINUOUS
Status: DISCONTINUED | OUTPATIENT
Start: 2024-08-02 | End: 2024-08-02

## 2024-08-02 RX ORDER — LIDOCAINE 40 MG/G
CREAM TOPICAL
Status: DISCONTINUED | OUTPATIENT
Start: 2024-08-02 | End: 2024-08-04 | Stop reason: HOSPADM

## 2024-08-02 RX ORDER — NALOXONE HYDROCHLORIDE 0.4 MG/ML
0.2 INJECTION, SOLUTION INTRAMUSCULAR; INTRAVENOUS; SUBCUTANEOUS
Status: DISCONTINUED | OUTPATIENT
Start: 2024-08-02 | End: 2024-08-04 | Stop reason: HOSPADM

## 2024-08-02 RX ORDER — SODIUM CHLORIDE, SODIUM LACTATE, POTASSIUM CHLORIDE, CALCIUM CHLORIDE 600; 310; 30; 20 MG/100ML; MG/100ML; MG/100ML; MG/100ML
INJECTION, SOLUTION INTRAVENOUS CONTINUOUS
Status: DISCONTINUED | OUTPATIENT
Start: 2024-08-02 | End: 2024-08-03

## 2024-08-02 RX ORDER — CALCIUM CARBONATE 500 MG/1
1000 TABLET, CHEWABLE ORAL 4 TIMES DAILY PRN
Status: DISCONTINUED | OUTPATIENT
Start: 2024-08-02 | End: 2024-08-04 | Stop reason: HOSPADM

## 2024-08-02 RX ORDER — ACETAMINOPHEN 325 MG/1
650 TABLET ORAL EVERY 6 HOURS PRN
Status: DISCONTINUED | OUTPATIENT
Start: 2024-08-02 | End: 2024-08-04 | Stop reason: HOSPADM

## 2024-08-02 RX ORDER — ONDANSETRON 4 MG/1
4 TABLET, ORALLY DISINTEGRATING ORAL EVERY 6 HOURS PRN
Status: DISCONTINUED | OUTPATIENT
Start: 2024-08-02 | End: 2024-08-04 | Stop reason: HOSPADM

## 2024-08-02 RX ORDER — ACETAMINOPHEN 650 MG/1
650 SUPPOSITORY RECTAL EVERY 6 HOURS PRN
Status: DISCONTINUED | OUTPATIENT
Start: 2024-08-02 | End: 2024-08-04 | Stop reason: HOSPADM

## 2024-08-02 RX ORDER — ONDANSETRON 2 MG/ML
4 INJECTION INTRAMUSCULAR; INTRAVENOUS ONCE
Status: DISCONTINUED | OUTPATIENT
Start: 2024-08-02 | End: 2024-08-02

## 2024-08-02 RX ORDER — ASPIRIN 81 MG/1
324 TABLET, CHEWABLE ORAL ONCE
Status: COMPLETED | OUTPATIENT
Start: 2024-08-02 | End: 2024-08-02

## 2024-08-02 RX ADMIN — SODIUM CHLORIDE, POTASSIUM CHLORIDE, SODIUM LACTATE AND CALCIUM CHLORIDE: 600; 310; 30; 20 INJECTION, SOLUTION INTRAVENOUS at 21:51

## 2024-08-02 RX ADMIN — CALCIUM CARBONATE (ANTACID) CHEW TAB 500 MG 1000 MG: 500 CHEW TAB at 20:25

## 2024-08-02 RX ADMIN — POLYETHYLENE GLYCOL 3350 17 G: 17 POWDER, FOR SOLUTION ORAL at 20:32

## 2024-08-02 RX ADMIN — ASPIRIN 81 MG CHEWABLE TABLET 324 MG: 81 TABLET CHEWABLE at 14:39

## 2024-08-02 RX ADMIN — PROPRANOLOL HYDROCHLORIDE 10 MG: 10 TABLET ORAL at 20:25

## 2024-08-02 RX ADMIN — Medication 400 MG: at 17:09

## 2024-08-02 RX ADMIN — SODIUM CHLORIDE, POTASSIUM CHLORIDE, SODIUM LACTATE AND CALCIUM CHLORIDE: 600; 310; 30; 20 INJECTION, SOLUTION INTRAVENOUS at 16:15

## 2024-08-02 RX ADMIN — PANTOPRAZOLE SODIUM 40 MG: 40 TABLET, DELAYED RELEASE ORAL at 17:09

## 2024-08-02 ASSESSMENT — ACTIVITIES OF DAILY LIVING (ADL)
ADLS_ACUITY_SCORE: 35
ADLS_ACUITY_SCORE: 21
ADLS_ACUITY_SCORE: 35
ADLS_ACUITY_SCORE: 21
VISION_MANAGEMENT: CONTACTS AND GLASSES
DOING_ERRANDS_INDEPENDENTLY_DIFFICULTY: NO
HEARING_DIFFICULTY_OR_DEAF: NO
CONCENTRATING,_REMEMBERING_OR_MAKING_DECISIONS_DIFFICULTY: NO
ADLS_ACUITY_SCORE: 35
WALKING_OR_CLIMBING_STAIRS_DIFFICULTY: NO
DRESSING/BATHING_DIFFICULTY: NO
ADLS_ACUITY_SCORE: 35
FALL_HISTORY_WITHIN_LAST_SIX_MONTHS: NO
ADLS_ACUITY_SCORE: 21
WEAR_GLASSES_OR_BLIND: YES
ADLS_ACUITY_SCORE: 21
ADLS_ACUITY_SCORE: 21
ADLS_ACUITY_SCORE: 35
CHANGE_IN_FUNCTIONAL_STATUS_SINCE_ONSET_OF_CURRENT_ILLNESS/INJURY: NO
DIFFICULTY_EATING/SWALLOWING: NO
DIFFICULTY_COMMUNICATING: NO
TOILETING_ISSUES: NO
ADLS_ACUITY_SCORE: 21

## 2024-08-02 ASSESSMENT — COLUMBIA-SUICIDE SEVERITY RATING SCALE - C-SSRS
6. HAVE YOU EVER DONE ANYTHING, STARTED TO DO ANYTHING, OR PREPARED TO DO ANYTHING TO END YOUR LIFE?: NO
2. HAVE YOU ACTUALLY HAD ANY THOUGHTS OF KILLING YOURSELF IN THE PAST MONTH?: NO
1. IN THE PAST MONTH, HAVE YOU WISHED YOU WERE DEAD OR WISHED YOU COULD GO TO SLEEP AND NOT WAKE UP?: NO

## 2024-08-02 NOTE — ED TRIAGE NOTES
Arrives to ED with c/o chest tightness last few days. Denies cardiac hx. Tightness to anterior chest that radiates to back and bilat scapula. Pt tachycardic. Reports intermittent numbness to face and BUE. Denies numbness at this time.      Triage Assessment (Adult)       Row Name 08/02/24 1246          Triage Assessment    Airway WDL WDL        Respiratory WDL    Respiratory WDL WDL        Skin Circulation/Temperature WDL    Skin Circulation/Temperature WDL WDL        Cardiac WDL    Cardiac WDL X;chest pain;rhythm     Pulse Rate & Regularity tachycardic        Peripheral/Neurovascular WDL    Peripheral Neurovascular WDL WDL        Cognitive/Neuro/Behavioral WDL    Cognitive/Neuro/Behavioral WDL WDL

## 2024-08-02 NOTE — PLAN OF CARE
Problem: Risk for Delirium  Goal: Optimal Coping  Outcome: Progressing  Goal: Improved Behavioral Control  Outcome: Progressing  Intervention: Minimize Safety Risk  Recent Flowsheet Documentation  Taken 8/2/2024 1600 by Kylie Finnegan RN  Trust Relationship/Rapport:   care explained   questions encouraged   questions answered  Goal: Improved Attention and Thought Clarity  Outcome: Progressing  Goal: Improved Sleep  Outcome: Progressing     Problem: Chest Pain  Goal: Resolution of Chest Pain Symptoms  Outcome: Progressing   Goal Outcome Evaluation:  Pt admitted to  316 ; Observation status.  Currently denies headache or chest discomfort.  Pt states 'just feel wiped out'. Oriented, pleasant, cooperative.  Orders for echocardiogram tomorrow and trend troponins.  Started LR 150ml/hr as ordered per MD.  Instructed pt to notifiy staff of any reoccurance of chest discomfort; she verbalized understanding.

## 2024-08-02 NOTE — ED PROVIDER NOTES
EMERGENCY DEPARTMENT ENCOUNTER      NAME: Yeimy Blackmon  AGE: 44 year old female  YOB: 1980  MRN: [id number]  EVALUATION DATE & TIME: 2024 12:43 PM    PCP: Rosemarie Livingston    ED PROVIDER: Tyler Hoover M.D.      Chief Complaint   Patient presents with    Chest Pain         FINAL IMPRESSION:  Acute intermittent chest pain.      ED COURSE & MEDICAL DECISION MAKIN:10 PM.  I met with the patient to gather history and to perform my initial exam. We discussed plans for the ED course, including diagnostic testing and treatment. PPE worn: cloth mask.  Patient with intermittent chest pain for the last several days occurring 3-4 times a day lasting anywhere from 5 to 15 minutes.  More persistent today.  Described as a chest tightness with radiation to the back.  No diaphoresis or vomiting but some nausea.  No shortness of breath.  EKG showing ST depressions in leads II, III and aVF.  No previous for comparison.  2:26 PM.  EKG showing ST depressions in the inferior leads.  CBC negative.  Chemistries negative.  Troponin less than 6.  D-dimer negative.  hCG negative.  Chest x-ray negative.  We did order aspirin nitroglycerin.  Given her stuttering pattern of pain and EKG changes plan admit patient to cardiac telemetry.  Will page the admitting service.  2:46 PM.  Hospitalist in agreement with cardiac telemetry observation admission given her ST segment depressions in the inferior leads.    Pertinent Labs & Imaging studies reviewed. (See chart for details)  44 year old female presents to the Emergency Department for evaluation of chest pain.  Pressure free at this time.    At the conclusion of the encounter I discussed the results of all of the tests and the disposition. The questions were answered. The patient or family acknowledged understanding and was agreeable with the care plan.              Medical Decision Making  Obtained supplemental history:Supplemental history obtained?: No  Reviewed  external records: Both inpatient and outpatient computer records reviewed.  Care impacted by chronic illness: migraine headaches, kidney stones  Care significantly affected by social determinants of health:Access to Medical Care  Did you consider but not order tests?: Work up considered but not performed and documented in chart, if applicable  Did you interpret images independently?: Independent interpretation of ECG and images noted in documentation, when applicable.  Consultation discussion with other provider: If admitted will discuss with admitting hospitalist and possibly cardiology.  Possible admission after evaluation.      MEDICATIONS GIVEN IN THE EMERGENCY:  Medications   aspirin (ASA) chewable tablet 324 mg (has no administration in time range)       NEW PRESCRIPTIONS STARTED AT TODAY'S ER VISIT  New Prescriptions    No medications on file          =================================================================    HPI    Patient information was obtained from: The patient.    Use of : N/A         Yeimy PADILLA Lorri is a 44 year old female with a pertinent history of kidney stones and migraine headaches who presents to this ED today for evaluation of intermittent chest pressure for the last several days.  3-4 times a day episodes anywhere from 5 to 10 to 20 minutes at a time.  More constant this morning.  No associated vomiting shortness of breath or diaphoresis.  Has noted some mild nausea.  Pain described as a pressure sensation radiating through to the back.    She does not identify any waxing or waning symptoms otherwise, exacerbating or alleviating features, associated symptoms except as mentioned.  She otherwise denies any pain related complaints.    REVIEW OF SYSTEMS   Review of Systems patient complaining of chest pressure and some nausea.  Otherwise no complaints. rare alcohol.  No drugs or tobacco.    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     low egg count, took 5 years to get pregnant with her first    Kidney stone 05/24/2022    dx with uti and 7 mm stone, stent placed at [facility]     Normal delivery 11/01/2016    Normal delivery 02/03/2019       PAST SURGICAL HISTORY:  Past Surgical History:   Procedure Laterality Date    cystoscopy with right ureteral stent placement  05/25/2022    R 7 mm stone with uti at the time, hospitalized           CURRENT MEDICATIONS:    ibuprofen (ADVIL/MOTRIN) 200 MG tablet  levonorgestrel-ethinyl estradiol (SEASONALE) 0.15-0.03 MG tablet  SUMAtriptan (IMITREX) 50 MG tablet        ALLERGIES:  No Known Allergies    FAMILY HISTORY:  Family History   Problem Relation Age of Onset    Rheumatoid Arthritis Mother     No Known Problems Father     Cancer Maternal Uncle         bladder    Rheumatoid Arthritis Maternal Uncle     Heart Disease Maternal Grandmother     Brain Cancer Maternal Grandmother     Early Death Maternal Grandfather         unknown cause    Rheumatoid Arthritis Maternal Uncle        SOCIAL HISTORY:   Social History     Socioeconomic History    Marital status:      Spouse name: Aristeo    Number of children: 2   Occupational History    Occupation:      Comment: [employer]   Tobacco Use    Smoking status: Never     Passive exposure: Never    Smokeless tobacco: Never   Vaping Use    Vaping status: Never Used   Substance and Sexual Activity    Alcohol use: No     Comment: Alcoholic Drinks/day: 1-2 times a year    Drug use: No    Sexual activity: Yes     Partners: Male     Birth control/protection: Surgical     Comment: vasectomy     Social Determinants of Health     Interpersonal Safety: Low Risk  (2/26/2024)    Interpersonal Safety     Do you feel physically and emotionally safe where you currently live?: Yes     Within the past 12 months, have you been hit, slapped, kicked or otherwise  "physically hurt by someone?: No     Within the past 12 months, have you been humiliated or emotionally abused in other ways by your partner or ex-partner?: No     Rare alcohol.  No drugs or tobacco.  VITALS:  BP (!) 137/93   Pulse (!) 131   Temp 97.1  F (36.2  C) (Temporal)   Resp 20   Ht 1.778 m (5' 10\")   Wt 68 kg (150 lb)   LMP 07/23/2024 (Approximate)   SpO2 100%   BMI 21.52 kg/m      PHYSICAL EXAM    Vital Signs:  BP (!) 137/93   Pulse (!) 131   Temp 97.1  F (36.2  C) (Temporal)   Resp 20   Ht 1.778 m (5' 10\")   Wt 68 kg (150 lb)   LMP 07/23/2024 (Approximate)   SpO2 100%   BMI 21.52 kg/m      General:  On entering the room she is in no   apparent distress.    Neck:  Neck supple with full range of motion and nontender.    Back:  Back and spine are nontender.  No costovertebral angle tenderness.    HEENT:  Oropharynx clear with moist mucous membranes.  HEENT unremarkable.    Pulmonary:  Chest clear to auscultation without rhonchi rales or wheezing.    Cardiovascular:  Cardiac regular rate and rhythm without murmurs rubs or gallops.    Abdomen:  Abdomen soft nontender.  There is no rebound or guarding.    Muskuloskeletal:  She moves all 4 without any difficulty and has normal neurovascular exams.  Extremities without clubbing, cyanosis, or edema.  Legs and calves are nontender.    Neuro:  She is alert and oriented ×3 and moves all extremities symmetrically.    Psych:  Normal affect.    Skin:  Unremarkable and warm and dry.       LAB:  All pertinent labs reviewed and interpreted.      RADIOLOGY:  Reviewed all pertinent imaging. Please see official radiology report.  XR Chest Port 1 View   Final Result   IMPRESSION: Negative chest.                 EKG:    No previous for comparison.  Sinus tachycardia at 121.  Right atrial larger, left atrial lodgment, right bundle branch block, ST segment depressions in leads II, III and aVF.    I have independently reviewed and interpreted the EKG(s) documented " above.        Tyler Hoover M.D.  Emergency Medicine  [facility] EMERGENCY ROOM  [facility] [facility] [id number] [phone]  Dept: [phone]       Tyler Hoover MD  08/02/24 1427       Tyler Hoover MD  08/02/24 1442

## 2024-08-02 NOTE — MEDICATION SCRIBE - ADMISSION MEDICATION HISTORY
Medication Scribe Admission Medication History    Admission medication history is complete. The information provided in this note is only as accurate as the sources available at the time of the update.    Information Source(s): Patient and CareEverywhere/SureScripts via in-person    Pertinent Information: Rx medication includes oral contraceptive and abortive migraine medication. Not on any other Rx medication.     Took one acetaminophen-caffeine 500 - 65 mg this morning.     Changes made to PTA medication list:  Added:   Acetaminophen-caffeine 500-65 mg  Multivitamin   Deleted: None  Changed:   Ibuprofen 200 m mg Q6H PRN --> 600 - 800 mg    Allergies reviewed with patient and updates made in EHR: yes    Medication History Completed By: Ruben Balbuena 2024 3:22 PM    Current Facility-Administered Medications for the 24 encounter (Hospital Encounter)   Medication    cyanocobalamin injection 1,000 mcg     PTA Med List   Medication Sig Last Dose    acetaminophen-caffeine (EXCEDRIN TENSION HEADACHE) 500-65 MG TABS Take 1-2 tablets by mouth every 6 hours as needed for mild pain 2024 at AM    ibuprofen (ADVIL/MOTRIN) 200 MG tablet Take 600-800 mg by mouth every 8 hours as needed for mild pain 2024 at 1500; 600 maybe 800 mg    levonorgestrel-ethinyl estradiol (SEASONALE) 0.15-0.03 MG tablet Take 1 tablet by mouth daily Past Week at     multivitamin, therapeutic (THERA-VIT) TABS tablet Take 1 tablet by mouth daily 2024 at AM    SUMAtriptan (IMITREX) 50 MG tablet Take 1 tablet (50 mg) by mouth at onset of headache for migraine Past Week at

## 2024-08-02 NOTE — PROGRESS NOTES
PRIMARY DIAGNOSIS: CHEST PAIN  OUTPATIENT/OBSERVATION GOALS TO BE MET BEFORE DISCHARGE:  1. Ruled out acute coronary syndrome (negative or stable Troponin):  Yes; monitor over night for reoccurrance of chest discomfort or EKG changes.   2. Pain Status: Pain free.  3. Appropriate provocative testing performed: Yes  - Stress Test Procedure: Echo ordered; perform in AM  - Interpretation of cardiac rhythm per telemetry tech: sinus rhythm    4. Cleared by Consultants (if applicable):N/A  5. Return to near baseline physical activity: Yes  Discharge Planner Nurse   Safe discharge environment identified: Yes  Barriers to discharge: No       Entered by: Kylie Finnegan RN 08/02/2024 6:42 PM     Please review provider order for any additional goals.   Nurse to notify provider when observation goals have been met and patient is ready for discharge.

## 2024-08-02 NOTE — H&P
[facility] MEDICINE ADMISSION HISTORY AND PHYSICAL   Date of Admission: 8/2/2024  Yeimy Blackmon, 1980, [id number]    Identification/Summary:   Yeimy Blackmon is a 44 year old female with PMH signficant for migraine.  Presented with chest tightness/burning for a week.  EKG showed ST depression on inferior leads.  She was given aspirin 325 mg in the ED and was attempted to start heparin drip.  Her symptoms and EKG changes are likely from taking sumatriptan and ibuprofen for a week.  She is admitted for observation.  Most likely she can be discharged today in the morning.    Assessment and Plan:  Chest tightness  ST depression in inferior leads  RBBB  Pain is 5 out of 10.  It could last for hours.  It is not triggered by exertion.  No previous EKG to compare. ECG showed ST depression about <1mm in II, III, avF. This could be a transient change from taking too many sumatriptan and ibuprofen in the past week.  Otherwise, low risk for coronary artery disease.  Status post aspirin 324 mg in the ED.  Heparin drip ordered in the ED was not initiated.  -Trend troponin  -EKG tomorrow morning or when she has chest pain  -Check TSH, lipid panel, hemoglobin A1c  -Consider echo if troponin is elevated    Chest burning  Radiating to stomach, bilateral shoulders.  Likely due to taking too much ibuprofen.   -Start pantoprazole 40 mg p.o.  -Check H. pylori in stool    Migraine  She had migraine for the past week+.  She took about 1 sumatriptan each day and 3 to 4 pills of 200 mg ibuprofen 1-2 times a day in the past week.  She used to take amitriptyline for prevention but has been off it since few years ago.  She is waiting for an appointment in August to talk about her migraine as an outpatient.  -Start propranolol 10 mg twice daily  -Start magnesium oxide 400 mg daily  -Hold off ibuprofen and sumatriptan now    Creatinine Kerwin  Creatinine is 0.96 up from her baseline 0.8-0.9.  eGFR is 75.  This is  likely affected by ibuprofen doses.  -Start IV fluid at 150 mL/h for the next 6 hours  -Repeat BMP in the morning    Code Status: Full Code    IVF: LR 150ml/h    FoleyNot present    Disposition: Observation     Clinically Significant Risk Factors Present on Admission                                     Chief Complaint Chest burning     HISTORY     Yeimy Blackmon is a 44 year old female with PMH signficant for migraine. Presented with chest tightness/burning for a week.     Patient reports she has had more migraines recently.  Alternates on the left and right side.  She is taking ibuprofen 600 to 800 mg 1-2 times a day and sumatriptan almost every day for the last 1+ week.  Since then, she started to have intermittent chest tightness/burning which radiates down to her stomach and sometimes up towards her shoulders.  She denies stabbing pain or dyspnea or nausea.  Her symptom is not related to exertion.  It could last for hours.  She denies personal cardiac history or family history of heart attack.  She denies history of hypertension, diabetes, smoking, drug use.    EKG showed ST depression on inferior leads. CXR is clear. Troponin is <6. She was given aspirin 325 mg in the ED and was attempted to start heparin drip.      She was not taking preventative medicine for migraine since she was off amitriptyline few years ago, when trying to get pregnant.  She now has no plan for more pregnancy.    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      Comment:  low egg count, took 5 years to get pregnant with her                first  05/24/2022: Kidney stone      Comment:  dx with uti and 7 mm stone, stent [facility] at [facility]  No date: Migraine  11/01/2016: Normal delivery  02/03/2019: Normal delivery     Patient Active Problem List    Diagnosis Date Noted    Unstable angina (H)  08/02/2024     Priority: Medium    Chest pressure 08/02/2024     Priority: Medium    Acute electrocardiogram changes 08/02/2024     Priority: Medium    Ureterolithiasis 08/14/2023     Priority: Medium    History of pyelonephritis 03/23/2023     Priority: Medium    History of kidney stones 03/23/2023     Priority: Medium    Mass of soft tissue of foot 08/16/2022     Priority: Medium    Achilles tendinitis of right lower extremity 08/16/2022     Priority: Medium    Migraine Headache      Priority: Medium     Created by Conversion  Replacement Utility updated for latest IMO load         Surgical History     Past Surgical History:   Procedure Laterality Date    cystoscopy with right ureteral stent placement  05/25/2022    R 7 mm stone with uti at the time, hospitalized     Family History      Family History   Problem Relation Age of Onset    Rheumatoid Arthritis Mother     No Known Problems Father     Cancer Maternal Uncle         bladder    Rheumatoid Arthritis Maternal Uncle     Heart Disease Maternal Grandmother     Brain Cancer Maternal Grandmother     Early Death Maternal Grandfather         unknown cause    Rheumatoid Arthritis Maternal Uncle       Social History      Social History     Tobacco Use    Smoking status: Never     Passive exposure: Never    Smokeless tobacco: Never   Vaping Use    Vaping status: Never Used   Substance Use Topics    Alcohol use: No     Comment: Alcoholic Drinks/day: 1-2 times a year    Drug use: No      Allergies   No Known Allergies  Prior to Admission Medications      Prior to Admission Medications   Prescriptions Last Dose Informant Patient Reported? Taking?   SUMAtriptan (IMITREX) 50 MG tablet   No No   Sig: Take 1 tablet (50 mg) by mouth at onset of headache for migraine   ibuprofen (ADVIL/MOTRIN) 200 MG tablet   Yes No   Sig: Take 800 mg by mouth every 8 hours as needed for mild pain   Patient not taking: Reported on 4/9/2024   levonorgestrel-ethinyl estradiol (SEASONALE)  0.15-0.03 MG tablet   No No   Sig: Take 1 tablet by mouth daily      Facility-Administered Medications Last Administration Doses Remaining   cyanocobalamin injection 1,000 mcg 7/30/2024  9:53 AM 1         Review of Systems     A 12 point comprehensive review of systems was negative except as noted above in HPI.    PHYSICAL EXAMINATION     Vitals      Temp:  [97.1  F (36.2  C)] 97.1  F (36.2  C)  Pulse:  [] 93  Resp:  [20] 20  BP: (130-137)/(74-93) 130/74  SpO2:  [100 %] 100 %    Examination     General Appearance: Alert and wake, not in distress  Respiratory: clear lungs, no crackles or wheezing  Cardiovascular: rhythmic, normal S1 and S2, no murmur  GI: soft, non-tender, normal bowel sound  Neurology: oriented x 3  Psych: cooperative and calm, normal affect      Pertinent Lab     Results for orders placed or performed during the hospital encounter of 08/02/24 (from the past 24 hour(s))   ECG 12-LEAD WITH MUSE (LHE)   Result Value Ref Range    Systolic Blood Pressure  mmHg    Diastolic Blood Pressure  mmHg    Ventricular Rate 121 BPM    Atrial Rate 121 BPM    ID Interval 156 ms    QRS Duration 108 ms     ms    QTc 457 ms    P Axis 75 degrees    R AXIS 138 degrees    T Axis 32 degrees    Interpretation ECG       Sinus tachycardia  Biatrial enlargement  Incomplete right bundle branch block  Possible Inferior infarct , age undetermined  Possible Anterolateral infarct , age undetermined  Abnormal ECG  No previous ECGs available  Confirmed by SEE ED PROVIDER NOTE FOR, ECG INTERPRETATION ([id number]),  JESSIKA BUENO ([id number]) on 8/2/2024 2:13:08 PM     CBC with Platelets & Differential    Narrative    The following orders were created for panel order CBC with Platelets & Differential.  Procedure                               Abnormality         Status                     ---------                               -----------         ------                     CBC with platelets and d...[[id number]]                       Final result                 Please view results for these tests on the individual orders.   Basic metabolic panel   Result Value Ref Range    Sodium 140 135 - 145 mmol/L    Potassium 4.4 3.4 - 5.3 mmol/L    Chloride 106 98 - 107 mmol/L    Carbon Dioxide (CO2) 24 22 - 29 mmol/L    Anion Gap 10 7 - 15 mmol/L    Urea Nitrogen 8.4 6.0 - 20.0 mg/dL    Creatinine 0.96 (H) 0.51 - 0.95 mg/dL    GFR Estimate 75 >60 mL/min/1.73m2    Calcium 10.2 8.8 - 10.4 mg/dL    Glucose 109 (H) 70 - 99 mg/dL   Troponin T, High Sensitivity   Result Value Ref Range    Troponin T, High Sensitivity <6 <=14 ng/L   [place] Draw    Narrative    The following orders were created for panel order [place] Draw.  Procedure                               Abnormality         Status                     ---------                               -----------         ------                     Extra Blue Top Tube[[id number]]                              Final result               Extra Red Top Tube[[id number]]                               Final result                 Please view results for these tests on the individual orders.   CBC with platelets and differential   Result Value Ref Range    WBC Count 9.3 4.0 - 11.0 10e3/uL    RBC Count 4.83 3.80 - 5.20 10e6/uL    Hemoglobin 14.7 11.7 - 15.7 g/dL    Hematocrit 42.8 35.0 - 47.0 %    MCV 89 78 - 100 fL    MCH 30.4 26.5 - 33.0 pg    MCHC 34.3 31.5 - 36.5 g/dL    RDW 12.3 10.0 - 15.0 %    Platelet Count 362 150 - 450 10e3/uL    % Neutrophils 74 %    % Lymphocytes 19 %    % Monocytes 6 %    % Eosinophils 0 %    % Basophils 0 %    % Immature Granulocytes 0 %    NRBCs per 100 WBC 0 <1 /100    Absolute Neutrophils 6.9 1.6 - 8.3 10e3/uL    Absolute Lymphocytes 1.8 0.8 - 5.3 10e3/uL    Absolute Monocytes 0.5 0.0 - 1.3 10e3/uL    Absolute Eosinophils 0.0 0.0 - 0.7 10e3/uL    Absolute Basophils 0.0 0.0 - 0.2 10e3/uL    Absolute Immature Granulocytes 0.0 <=0.4 10e3/uL    Absolute NRBCs 0.0 10e3/uL   Extra Blue Top  Tube   Result Value Ref Range    Hold Specimen JIC    Extra Red Top Tube   Result Value Ref Range    Hold Specimen     D dimer quantitative   Result Value Ref Range    D-Dimer Quantitative <=0.27 0.00 - 0.50 ug/mL FEU    Narrative    This D-dimer assay is intended for use in conjunction with a clinical pretest probability assessment model to exclude pulmonary embolism (PE) and deep venous thrombosis (DVT) in outpatients suspected of PE or DVT. The cut-off value is 0.50 ug/mL FEU.   HCG qualitative Blood   Result Value Ref Range    hCG Serum Qualitative Negative Negative   XR Chest Port 1 View    Narrative    EXAM: XR CHEST PORT 1 VIEW  LOCATION: [facility]  DATE: 8/2/2024    INDICATION: Chest Pain.  COMPARISON: None.      Impression    IMPRESSION: Negative chest.       Pertinent Radiology     Radiology Results:   Recent Results (from the past 24 hour(s))   XR Chest Port 1 View    Narrative    EXAM: XR CHEST PORT 1 VIEW  LOCATION: [facility]  DATE: 8/2/2024    INDICATION: Chest Pain.  COMPARISON: None.      Impression    IMPRESSION: Negative chest.       EKG Results:  Personally interpreted.  ST depression about 1 mm on II, III, aVF    I spoke with Dr. Hoover for patient's care.    ANANT SORIANO MD  Hospitalist  Hospital Medicine  [facility]   Phone: #[phone]

## 2024-08-02 NOTE — TELEPHONE ENCOUNTER
"Nurse Triage SBAR    Is this a 2nd Level Triage? NO    Situation: Called patient to relay provider message. Patient reported ongoing chest tightness that she was concerned about.     Background: History of kidney stones.     Patient reports she got a B12 shot on 7/30/24.     Assessment: Patient is reporting centralized chest \"tightness\" and burning with some pain that has been ongoing most of the day. Reports goes from upper chest to top of abdomen.   Rates pain 3/10 and states it radiates to back shoulder blade.     Reports occasional hot flashes and occasional cough.     Patient is concerned it could be cardiac related.     Patient denies shortness of breath, dizziness, nausea, vomiting, sweating, or fever.     Protocol Recommended Disposition:   Go to ED Now    Recommendation: Patient advised to go to ED now. Patient verbalized understanding and states she will have her  take her to [facility] ED.      Does the patient meet one of the following criteria for ADS visit consideration? 16+ years old, with an [facility] PCP     TIP  Providers, please consider if this condition is appropriate for management at one of our Acute and Diagnostic Services sites.     If patient is a good candidate, please use dotphrase <dot>triageresponse and select Refer to ADS to document.      Reason for Disposition   Pain also in shoulder(s) or arm(s) or jaw    Additional Information   Negative: SEVERE difficulty breathing (e.g., struggling for each breath, speaks in single words)   Negative: Difficult to awaken or acting confused (e.g., disoriented, slurred speech)   Negative: Shock suspected (e.g., cold/pale/clammy skin, too weak to stand, low BP, rapid pulse)   Negative: Passed out (i.e., lost consciousness, collapsed and was not responding)   Negative: Followed an injury to chest   Negative: Chest pain lasting longer than 5 minutes and ANY of the following:         Pain is crushing, pressure-like, or heavy         Over 44 " "years old          Over 30 years old and one cardiac risk factor (e.g diabetes, high blood pressure, high cholesterol, smoker, or family history of heart disease)         History of heart disease (e.g. angina, heart attack, heart failure, bypass surgery, takes nitroglycerin)   Negative: Heart beating < 50 beats per minute OR > 140 beats per minute   Negative: Visible sweat on face or sweat dripping down face   Negative: Sounds like a life-threatening emergency to the triager   Negative: SEVERE chest pain    Answer Assessment - Initial Assessment Questions  1. LOCATION: \"Where does it hurt?\"        Center of chest up higher and top of abdomen  2. RADIATION: \"Does the pain go anywhere else?\" (e.g., into neck, jaw, arms, back)      Radiates into back  3. ONSET: \"When did the chest pain begin?\" (Minutes, hours or days)       On and off a few days ago  4. PATTERN: \"Does the pain come and go, or has it been constant since it started?\"  \"Does it get worse with exertion?\"       Comes and goes. Does not get worse with exertion.   5. DURATION: \"How long does it last\" (e.g., seconds, minutes, hours)      A couple of hours   6. SEVERITY: \"How bad is the pain?\"  (e.g., Scale 1-10; mild, moderate, or severe)     - MILD (1-3): doesn't interfere with normal activities      - MODERATE (4-7): interferes with normal activities or awakens from sleep     - SEVERE (8-10): excruciating pain, unable to do any normal activities        Pain 3/10,  just a burning/tightness.   7. CARDIAC RISK FACTORS: \"Do you have any history of heart problems or risk factors for heart disease?\" (e.g., angina, prior heart attack; diabetes, high blood pressure, high cholesterol, smoker, or strong family history of heart disease)      No  8. PULMONARY RISK FACTORS: \"Do you have any history of lung disease?\"  (e.g., blood clots in lung, asthma, emphysema, birth control pills)      Uses birth control pills   9. CAUSE: \"What do you think is causing the chest " "pain?\"      Unknown   10. OTHER SYMPTOMS: \"Do you have any other symptoms?\" (e.g., dizziness, nausea, vomiting, sweating, fever, difficulty breathing, cough)        Occasional hot flashes, occasional cough.   11. PREGNANCY: \"Is there any chance you are pregnant?\" \"When was your last menstrual period?\"        No    Protocols used: Chest Pain-A-[place]    Leslie Mills RN  [facility]  "

## 2024-08-03 ENCOUNTER — APPOINTMENT (OUTPATIENT)
Dept: MRI IMAGING | Facility: CLINIC | Age: 44
End: 2024-08-03
Attending: STUDENT IN AN ORGANIZED HEALTH CARE EDUCATION/TRAINING PROGRAM
Payer: COMMERCIAL

## 2024-08-03 LAB
ALBUMIN SERPL BCG-MCNC: 3.9 G/DL (ref 3.5–5.2)
ALP SERPL-CCNC: 55 U/L (ref 40–150)
ALT SERPL W P-5'-P-CCNC: 16 U/L (ref 0–50)
ANION GAP SERPL CALCULATED.3IONS-SCNC: 6 MMOL/L (ref 7–15)
ANION GAP SERPL CALCULATED.3IONS-SCNC: 7 MMOL/L (ref 7–15)
AST SERPL W P-5'-P-CCNC: 16 U/L (ref 0–45)
BASE EXCESS BLDV CALC-SCNC: 2.6 MMOL/L (ref -3–3)
BILIRUB DIRECT SERPL-MCNC: <0.2 MG/DL (ref 0–0.3)
BILIRUB SERPL-MCNC: 0.5 MG/DL
BUN SERPL-MCNC: 8 MG/DL (ref 6–20)
BUN SERPL-MCNC: 8.5 MG/DL (ref 6–20)
CALCIUM SERPL-MCNC: 9.2 MG/DL (ref 8.8–10.4)
CALCIUM SERPL-MCNC: 9.3 MG/DL (ref 8.8–10.4)
CHLORIDE SERPL-SCNC: 107 MMOL/L (ref 98–107)
CHLORIDE SERPL-SCNC: 109 MMOL/L (ref 98–107)
CHOLEST SERPL-MCNC: 141 MG/DL
CREAT SERPL-MCNC: 0.86 MG/DL (ref 0.51–0.95)
CREAT SERPL-MCNC: 0.93 MG/DL (ref 0.51–0.95)
EGFRCR SERPLBLD CKD-EPI 2021: 78 ML/MIN/1.73M2
EGFRCR SERPLBLD CKD-EPI 2021: 85 ML/MIN/1.73M2
ERYTHROCYTE [DISTWIDTH] IN BLOOD BY AUTOMATED COUNT: 12.2 % (ref 10–15)
FASTING STATUS PATIENT QL REPORTED: YES
GLUCOSE SERPL-MCNC: 96 MG/DL (ref 70–99)
GLUCOSE SERPL-MCNC: 97 MG/DL (ref 70–99)
HCO3 BLDV-SCNC: 28 MMOL/L (ref 21–28)
HCO3 SERPL-SCNC: 26 MMOL/L (ref 22–29)
HCO3 SERPL-SCNC: 26 MMOL/L (ref 22–29)
HCT VFR BLD AUTO: 35.6 % (ref 35–47)
HDLC SERPL-MCNC: 45 MG/DL
HGB BLD-MCNC: 12 G/DL (ref 11.7–15.7)
HOLD SPECIMEN: NORMAL
LDLC SERPL CALC-MCNC: 83 MG/DL
MCH RBC QN AUTO: 30.1 PG (ref 26.5–33)
MCHC RBC AUTO-ENTMCNC: 33.7 G/DL (ref 31.5–36.5)
MCV RBC AUTO: 89 FL (ref 78–100)
NONHDLC SERPL-MCNC: 96 MG/DL
O2/TOTAL GAS SETTING VFR VENT: 21 %
OXYHGB MFR BLDV: 50 % (ref 70–75)
PCO2 BLDV: 47 MM HG (ref 40–50)
PH BLDV: 7.38 [PH] (ref 7.32–7.43)
PLATELET # BLD AUTO: 276 10E3/UL (ref 150–450)
PO2 BLDV: 29 MM HG (ref 25–47)
POTASSIUM SERPL-SCNC: 4.5 MMOL/L (ref 3.4–5.3)
POTASSIUM SERPL-SCNC: 4.5 MMOL/L (ref 3.4–5.3)
PROT SERPL-MCNC: 6.1 G/DL (ref 6.4–8.3)
RBC # BLD AUTO: 3.99 10E6/UL (ref 3.8–5.2)
SAO2 % BLDV: 51.2 % (ref 70–75)
SODIUM SERPL-SCNC: 140 MMOL/L (ref 135–145)
SODIUM SERPL-SCNC: 141 MMOL/L (ref 135–145)
TRIGL SERPL-MCNC: 63 MG/DL
VIT B12 SERPL-MCNC: 558 PG/ML (ref 232–1245)
WBC # BLD AUTO: 8.3 10E3/UL (ref 4–11)

## 2024-08-03 PROCEDURE — 82248 BILIRUBIN DIRECT: CPT | Performed by: STUDENT IN AN ORGANIZED HEALTH CARE EDUCATION/TRAINING PROGRAM

## 2024-08-03 PROCEDURE — 250N000011 HC RX IP 250 OP 636: Performed by: STUDENT IN AN ORGANIZED HEALTH CARE EDUCATION/TRAINING PROGRAM

## 2024-08-03 PROCEDURE — 85049 AUTOMATED PLATELET COUNT: CPT | Performed by: STUDENT IN AN ORGANIZED HEALTH CARE EDUCATION/TRAINING PROGRAM

## 2024-08-03 PROCEDURE — 80061 LIPID PANEL: CPT | Performed by: STUDENT IN AN ORGANIZED HEALTH CARE EDUCATION/TRAINING PROGRAM

## 2024-08-03 PROCEDURE — 82607 VITAMIN B-12: CPT | Performed by: STUDENT IN AN ORGANIZED HEALTH CARE EDUCATION/TRAINING PROGRAM

## 2024-08-03 PROCEDURE — G0378 HOSPITAL OBSERVATION PER HR: HCPCS

## 2024-08-03 PROCEDURE — 36415 COLL VENOUS BLD VENIPUNCTURE: CPT | Performed by: STUDENT IN AN ORGANIZED HEALTH CARE EDUCATION/TRAINING PROGRAM

## 2024-08-03 PROCEDURE — 70551 MRI BRAIN STEM W/O DYE: CPT

## 2024-08-03 PROCEDURE — 82805 BLOOD GASES W/O2 SATURATION: CPT | Performed by: STUDENT IN AN ORGANIZED HEALTH CARE EDUCATION/TRAINING PROGRAM

## 2024-08-03 PROCEDURE — 258N000003 HC RX IP 258 OP 636: Performed by: STUDENT IN AN ORGANIZED HEALTH CARE EDUCATION/TRAINING PROGRAM

## 2024-08-03 PROCEDURE — 250N000013 HC RX MED GY IP 250 OP 250 PS 637: Performed by: STUDENT IN AN ORGANIZED HEALTH CARE EDUCATION/TRAINING PROGRAM

## 2024-08-03 PROCEDURE — 80053 COMPREHEN METABOLIC PANEL: CPT | Performed by: STUDENT IN AN ORGANIZED HEALTH CARE EDUCATION/TRAINING PROGRAM

## 2024-08-03 PROCEDURE — 96361 HYDRATE IV INFUSION ADD-ON: CPT

## 2024-08-03 PROCEDURE — 99232 SBSQ HOSP IP/OBS MODERATE 35: CPT | Performed by: STUDENT IN AN ORGANIZED HEALTH CARE EDUCATION/TRAINING PROGRAM

## 2024-08-03 RX ORDER — PROPRANOLOL HCL 10 MG
5 TABLET ORAL 2 TIMES DAILY
Status: DISCONTINUED | OUTPATIENT
Start: 2024-08-03 | End: 2024-08-04 | Stop reason: HOSPADM

## 2024-08-03 RX ORDER — PROPRANOLOL HCL 10 MG
5 TABLET ORAL ONCE
Status: COMPLETED | OUTPATIENT
Start: 2024-08-03 | End: 2024-08-03

## 2024-08-03 RX ORDER — MAGNESIUM OXIDE 400 MG/1
400 TABLET ORAL ONCE
Status: COMPLETED | OUTPATIENT
Start: 2024-08-03 | End: 2024-08-03

## 2024-08-03 RX ORDER — UBIDECARENONE 75 MG
100 CAPSULE ORAL DAILY
Status: DISCONTINUED | OUTPATIENT
Start: 2024-08-03 | End: 2024-08-04 | Stop reason: HOSPADM

## 2024-08-03 RX ORDER — LIDOCAINE HYDROCHLORIDE 20 MG/ML
5 SOLUTION OROPHARYNGEAL
Status: DISCONTINUED | OUTPATIENT
Start: 2024-08-03 | End: 2024-08-04 | Stop reason: HOSPADM

## 2024-08-03 RX ORDER — ONDANSETRON 4 MG/1
4 TABLET, FILM COATED ORAL ONCE
Status: COMPLETED | OUTPATIENT
Start: 2024-08-03 | End: 2024-08-03

## 2024-08-03 RX ORDER — DOCUSATE SODIUM 100 MG/1
100 CAPSULE, LIQUID FILLED ORAL 2 TIMES DAILY
Status: DISCONTINUED | OUTPATIENT
Start: 2024-08-03 | End: 2024-08-03

## 2024-08-03 RX ORDER — DIPHENHYDRAMINE HCL 25 MG
25 CAPSULE ORAL ONCE
Status: COMPLETED | OUTPATIENT
Start: 2024-08-03 | End: 2024-08-03

## 2024-08-03 RX ORDER — ACETAMINOPHEN 325 MG/1
975 TABLET ORAL ONCE
Status: COMPLETED | OUTPATIENT
Start: 2024-08-03 | End: 2024-08-03

## 2024-08-03 RX ORDER — DOCUSATE SODIUM 100 MG/1
100 CAPSULE, LIQUID FILLED ORAL
Status: DISCONTINUED | OUTPATIENT
Start: 2024-08-03 | End: 2024-08-04 | Stop reason: HOSPADM

## 2024-08-03 RX ORDER — CAFFEINE 200 MG
100 TABLET ORAL ONCE
Status: COMPLETED | OUTPATIENT
Start: 2024-08-03 | End: 2024-08-03

## 2024-08-03 RX ORDER — MAGNESIUM HYDROXIDE/ALUMINUM HYDROXICE/SIMETHICONE 120; 1200; 1200 MG/30ML; MG/30ML; MG/30ML
15 SUSPENSION ORAL 3 TIMES DAILY PRN
Status: DISCONTINUED | OUTPATIENT
Start: 2024-08-03 | End: 2024-08-04 | Stop reason: HOSPADM

## 2024-08-03 RX ADMIN — SODIUM CHLORIDE, POTASSIUM CHLORIDE, SODIUM LACTATE AND CALCIUM CHLORIDE: 600; 310; 30; 20 INJECTION, SOLUTION INTRAVENOUS at 04:16

## 2024-08-03 RX ADMIN — ONDANSETRON HYDROCHLORIDE 4 MG: 4 TABLET, FILM COATED ORAL at 14:33

## 2024-08-03 RX ADMIN — DOCUSATE SODIUM 100 MG: 100 CAPSULE, LIQUID FILLED ORAL at 19:37

## 2024-08-03 RX ADMIN — ACETAMINOPHEN 975 MG: 325 TABLET ORAL at 14:33

## 2024-08-03 RX ADMIN — PROPRANOLOL HYDROCHLORIDE 5 MG: 10 TABLET ORAL at 08:15

## 2024-08-03 RX ADMIN — Medication 400 MG: at 14:33

## 2024-08-03 RX ADMIN — PROPRANOLOL HYDROCHLORIDE 5 MG: 10 TABLET ORAL at 14:33

## 2024-08-03 RX ADMIN — Medication 400 MG: at 08:15

## 2024-08-03 RX ADMIN — DIPHENHYDRAMINE HYDROCHLORIDE 25 MG: 25 CAPSULE ORAL at 14:33

## 2024-08-03 RX ADMIN — PROPRANOLOL HYDROCHLORIDE 5 MG: 10 TABLET ORAL at 19:37

## 2024-08-03 RX ADMIN — PANTOPRAZOLE SODIUM 40 MG: 40 TABLET, DELAYED RELEASE ORAL at 08:14

## 2024-08-03 RX ADMIN — Medication 100 MCG: at 08:14

## 2024-08-03 RX ADMIN — CAFFEINE 100 MG: 200 TABLET ORAL at 14:33

## 2024-08-03 RX ADMIN — Medication 1 MG: at 22:11

## 2024-08-03 ASSESSMENT — ACTIVITIES OF DAILY LIVING (ADL)
ADLS_ACUITY_SCORE: 21
ADLS_ACUITY_SCORE: 20
ADLS_ACUITY_SCORE: 20
ADLS_ACUITY_SCORE: 21
ADLS_ACUITY_SCORE: 20
ADLS_ACUITY_SCORE: 20
ADLS_ACUITY_SCORE: 21
ADLS_ACUITY_SCORE: 20
ADLS_ACUITY_SCORE: 20
ADLS_ACUITY_SCORE: 21
ADLS_ACUITY_SCORE: 20
ADLS_ACUITY_SCORE: 21
ADLS_ACUITY_SCORE: 20
ADLS_ACUITY_SCORE: 21
ADLS_ACUITY_SCORE: 20
ADLS_ACUITY_SCORE: 20
ADLS_ACUITY_SCORE: 21

## 2024-08-03 NOTE — PROVIDER NOTIFICATION
Paged NOC hospitalist per vital sign order set.    - FYI, pt has been bradying down to the mid to high 40s overnight. She's asymptomatic and otherwise vitally stable. - [id number]

## 2024-08-03 NOTE — PROGRESS NOTES
Pt c/o chest pain and tightness that radiated to their back: per pt, the same symptoms that brought them to the ED, present after they finished dinner. VSS, A&Ox4 Neuro intact, no new symptoms.[facility] Sx contacted, EKG done @bedside. No changes noted. Tums given. Pt able to make needs known. Will continue to monitor.Pt resting in between cares.

## 2024-08-03 NOTE — PROGRESS NOTES
PRIMARY DIAGNOSIS: ACUTE PAIN  OUTPATIENT/OBSERVATION GOALS TO BE MET BEFORE DISCHARGE:  1. Pain Status: Improved-controlled with oral pain medications.    2. Return to near baseline physical activity: Yes    3. Cleared for discharge by consultants (if involved): No; just back from MRI; results pending.  IV saline locked.     Discharge Planner Nurse   Safe discharge environment identified: Yes  Barriers to discharge: Yes; await results of MRI.        Entered by: Kylie Finnegan RN 08/03/2024 5:13 PM     Please review provider order for any additional goals.   Nurse to notify provider when observation goals have been met and patient is ready for discharge.

## 2024-08-03 NOTE — PROGRESS NOTES
"PRIMARY DIAGNOSIS: \"GENERIC\" NURSING  OUTPATIENT/OBSERVATION GOALS TO BE MET BEFORE DISCHARGE:  ADLs back to baseline: Yes    Activity and level of assistance: Ambulating independently.    Pain status: Improved with use of alternative comfort measures i.e.: ice    Return to near baseline physical activity: Yes       "

## 2024-08-03 NOTE — PROGRESS NOTES
PRIMARY DIAGNOSIS: CHEST PAIN  OUTPATIENT/OBSERVATION GOALS TO BE MET BEFORE DISCHARGE:  1. Ruled out acute coronary syndrome (negative or stable Troponin):  Yes  2. Pain Status: Improved with use of alternative comfort measures i.e.: essential oils and trying caffienated beverages; denies chest pain at present.  3. Appropriate provocative testing performed: N/A  - Stress Test Procedure: MD does not feel needs cardiac test at this time  - Interpretation of cardiac rhythm per RN; sinus rhythm    4. Cleared by Consultants (if applicable):N/A  5. Return to near baseline physical activity: No  Discharge Planner Nurse   Safe discharge environment identified: Yes  Barriers to discharge: No       Entered by: Kylie Finnegan RN 08/03/2024 10:46 AM     Please review provider order for any additional goals.   Nurse to notify provider when observation goals have been met and patient is ready for discharge.  IV infusion of LR discontinued per MD: pt eating but now c/o of mild headache discomfort. MD aware.

## 2024-08-03 NOTE — PROGRESS NOTES
PRIMARY DIAGNOSIS: CHEST PAIN  OUTPATIENT/OBSERVATION GOALS TO BE MET BEFORE DISCHARGE:  1. Ruled out acute coronary syndrome (negative or stable Troponin):  Yes  2. Pain Status: No improvement noted. Consider adjustment in pain regimen. No chest pain, but continues to c/o posterior headache/ neck pain, rates '5'.  Also c/o tingling to right side of head and right ear  3. Appropriate provocative testing performed: Yes; scheduled to have MRI of head this afternoon.  - Stress Test Procedure: n/a  - Interpretation of cardiac rhythm per RN: sinus    4. Cleared by Consultants (if applicable):N/A  5. Return to near baseline physical activity: No  Discharge Planner Nurse   Safe discharge environment identified: Yes  Barriers to discharge: Yes; ongoing headache pain with tingling to right head/ bilateral arms, ear pressure.  Headache pain not well controlled.        Entered by: Kylie Finnegan RN 08/03/2024 2:44 PM     Please review provider order for any additional goals.   Nurse to notify provider when observation goals have been met and patient is ready for discharge. Reminded hospitalist that pt is still Observation status.

## 2024-08-03 NOTE — PROGRESS NOTES
[facility] MEDICINE  PROGRESS NOTE       Securely message me with Jeremías (more info)    Code Status: Full Code       Identification/Summary:   Yeimy Blackmon is a 44 year old female with PMH signficant for migraine.  Presented with chest tightness/burning for a week.  EKG showed ST depression on inferior leads.  She was given aspirin 325 mg in the ED and was attempted to start heparin drip.  Her symptoms and EKG changes are likely from taking sumatriptan and ibuprofen for a week.  EKG looks better now.  Her headache persists.  She also develops numbness, tingling on the right side of her head, ear, and both forearms.  MRI brain ordered.    Barriers to Discharge: Intractable headache         Assessment and Plan:  Chest tightness  ST depression in inferior leads  RBBB  Pain is 5 out of 10.  It could last for hours.  It is not triggered by exertion.  No previous EKG to compare. ECG showed ST depression about <1mm in II, III, avF. This could be a transient change from taking too many sumatriptan and ibuprofen in the past week.  Otherwise, low risk for coronary artery disease.  Status post aspirin 324 mg in the ED.  Heparin drip ordered in the ED was not initiated.  -Trend troponin -negative  -EKG repeat with resolution of RBBB and ST depression  -Check TSH, lipid panel, hemoglobin A1c -all normal  -Consider echo if troponin is elevated     Chest burning  Radiating to stomach, bilateral shoulders.  Likely due to taking too much ibuprofen.   -Start pantoprazole 40 mg p.o.  -Check H. pylori in stool  -GI cocktail, Tums as needed     Headache  Tingling  She had migraine for the past week+.  She took about 1 sumatriptan each day and 3 to 4 pills of 200 mg ibuprofen 1-2 times a day in the past week.  She used to take amitriptyline for prevention but has been off it since few years ago.  She is waiting for an appointment in August to talk about her migraine as an outpatient.  -MRI brain, MS  vs migraine with aura  -Possible overventilation, ordered BMP and VBG  -Start propranolol 5mg twice daily  -Start magnesium oxide 400 mg daily  -Hold off ibuprofen and sumatriptan now  -will give a headache cocktail now    CKD stage 1  Creatinine is 0.96 up from her baseline 0.8-0.9.  eGFR is 75.  This is likely affected by ibuprofen doses.  -Start IV fluid at 150 mL/h for the next 6 hours  -improved        Anticoagulation   Ambulatory      Therapy: None  Jones:Not present  Lines: None       Current Diet  Orders Placed This Encounter      Regular Diet Adult        Clinically Significant Risk Factors Present on Admission                                     Interval History/Subjective:  Patient might have benefited from Tums for her chest burning after she ate yesterday.  She did not have any chest burning, tightness today.  She still has a headache and tingling on her right face head ear and bilateral upper extremities.      Last 24H PRN:     calcium carbonate (TUMS) chewable tablet 1,000 mg, 1,000 mg at 08/02/24 2025    polyethylene glycol (MIRALAX) Packet 17 g, 17 g at 08/02/24 2032    Physical Exam/Objective:  Temp:  [97.8  F (36.6  C)-98.7  F (37.1  C)] 97.8  F (36.6  C)  Pulse:  [50-95] 75  Resp:  [16-22] 16  BP: (113-130)/(57-74) 113/57  SpO2:  [95 %-100 %] 99 %  Wt Readings from Last 4 Encounters:   08/03/24 66.7 kg (147 lb 0.8 oz)   02/26/24 67.8 kg (149 lb 6.4 oz)   08/14/23 68 kg (150 lb)   03/24/23 68.5 kg (151 lb)     Body mass index is 21.1 kg/m .    General Appearance: Alert and wake, not in distress  Neurology: oriented x 3  Psych: cooperative and calm, normal affect    Medications:   Personally Reviewed.  Medications   Current Facility-Administered Medications   Medication Dose Route Frequency Provider Last Rate Last Admin     Current Facility-Administered Medications   Medication Dose Route Frequency Provider Last Rate Last Admin    cyanocobalamin (VITAMIN B-12) tablet 100 mcg  100 mcg Oral Daily  Promise Starks MD   100 mcg at 08/03/24 0814    docusate sodium (COLACE) capsule 100 mg  100 mg Oral BID Promise Starks MD        levonorgestrel-ethinyl estradiol (NORDETTE) 0.15-30 MG-MCG per tablet 1 tablet  1 tablet Oral Daily Promise Starks MD        magnesium oxide (MAG-OX) tablet 400 mg  400 mg Oral Daily Promise Starks MD   400 mg at 08/03/24 0815    pantoprazole (PROTONIX) EC tablet 40 mg  40 mg Oral QAM AC Promise Starks MD   40 mg at 08/03/24 0814    propranolol (INDERAL) half-tab 5 mg  5 mg Oral BID Promise Starks MD   5 mg at 08/03/24 0815    sodium chloride (PF) 0.9% PF flush 3 mL  3 mL Intracatheter Q8H Promise Starks MD   3 mL at 08/02/24 1518       Data reviewed today: I personally reviewed all new medications, labs, imaging/diagnostics reports over the past 24 hours. Pertinent findings include:    Imaging:   Recent Results (from the past 24 hour(s))   XR Chest Port 1 View    Narrative    EXAM: XR CHEST PORT 1 VIEW  LOCATION: [facility]  DATE: 8/2/2024    INDICATION: Chest Pain.  COMPARISON: None.      Impression    IMPRESSION: Negative chest.       Labs:  XR Chest Port 1 View   Final Result   IMPRESSION: Negative chest.      MR Brain w/o Contrast    (Results Pending)     Recent Results (from the past 24 hour(s))   Troponin T, High Sensitivity    Collection Time: 08/02/24  3:27 PM   Result Value Ref Range    Troponin T, High Sensitivity <6 <=14 ng/L   TSH with free T4 reflex    Collection Time: 08/02/24  3:27 PM   Result Value Ref Range    TSH 1.15 0.30 - 4.20 uIU/mL   ECG 12-LEAD WITH MUSE (LHE)    Collection Time: 08/02/24  9:05 PM   Result Value Ref Range    Systolic Blood Pressure  mmHg    Diastolic Blood Pressure  mmHg    Ventricular Rate 65 BPM    Atrial Rate 65 BPM    MS Interval 140 ms    QRS Duration 100 ms     ms    QTc 432 ms    P Axis 65 degrees    R AXIS 91 degrees    T Axis 61 degrees    Interpretation ECG       Sinus rhythm  Rightward axis  Borderline ECG  When  compared with ECG of 02-Aug-2024 12:47,  Vent. rate has decreased by  56 bpm  Incomplete right bundle branch block is no longer Present  Borderline criteria for Anterior infarct are no longer Present  Borderline criteria for Anterolateral infarct are no longer Present  Borderline criteria for Inferior infarct are no longer Present     Basic metabolic panel    Collection Time: 08/03/24  4:24 AM   Result Value Ref Range    Sodium 141 135 - 145 mmol/L    Potassium 4.5 3.4 - 5.3 mmol/L    Chloride 109 (H) 98 - 107 mmol/L    Carbon Dioxide (CO2) 26 22 - 29 mmol/L    Anion Gap 6 (L) 7 - 15 mmol/L    Urea Nitrogen 8.5 6.0 - 20.0 mg/dL    Creatinine 0.93 0.51 - 0.95 mg/dL    GFR Estimate 78 >60 mL/min/1.73m2    Calcium 9.3 8.8 - 10.4 mg/dL    Glucose 97 70 - 99 mg/dL   Hepatic panel    Collection Time: 08/03/24  4:24 AM   Result Value Ref Range    Protein Total 6.1 (L) 6.4 - 8.3 g/dL    Albumin 3.9 3.5 - 5.2 g/dL    Bilirubin Total 0.5 <=1.2 mg/dL    Alkaline Phosphatase 55 40 - 150 U/L    AST 16 0 - 45 U/L    ALT 16 0 - 50 U/L    Bilirubin Direct <0.20 0.00 - 0.30 mg/dL   CBC with platelets    Collection Time: 08/03/24  4:24 AM   Result Value Ref Range    WBC Count 8.3 4.0 - 11.0 10e3/uL    RBC Count 3.99 3.80 - 5.20 10e6/uL    Hemoglobin 12.0 11.7 - 15.7 g/dL    Hematocrit 35.6 35.0 - 47.0 %    MCV 89 78 - 100 fL    MCH 30.1 26.5 - 33.0 pg    MCHC 33.7 31.5 - 36.5 g/dL    RDW 12.2 10.0 - 15.0 %    Platelet Count 276 150 - 450 10e3/uL       Pending Labs:  Unresulted Labs Ordered in the Past 30 Days of this Admission       Date and Time Order Name Status Description    8/3/2024 12:00 AM Lipid panel reflex to direct LDL In process             I spoke with spouse to discuss patient's care.    ANANT SORIANO MD  [facility]  Phone: #[phone]    Securely message me with Jeremías (more info)

## 2024-08-03 NOTE — PROGRESS NOTES
PRIMARY DIAGNOSIS: CHEST PAIN  OUTPATIENT/OBSERVATION GOALS TO BE MET BEFORE DISCHARGE:  1. Ruled out acute coronary syndrome (negative or stable Troponin):  Yes  2. Pain Status: Improved with use of alternative comfort measures i.e.: ice  3. Appropriate provocative testing performed: Yes  - Stress Test Procedure: Echo - ordered for AM  - Interpretation of cardiac rhythm per telemetry tech: Sinus lyric - provider notified    4. Cleared by Consultants (if applicable):N/A  5. Return to near baseline physical activity: Yes    Pt oriented x4. Endorsing mild anxiety. Endorsing improved pain in substernal area, minimal numbness/tingling in face/neck area. Pain controlled with ice overnight. IV fluids continued. VSS, although pt did become lyric cardic (45-50) once pt fell asleep. Asymptomatic. Provider notified (see note). Ambulating independently. Voiding spontaneously.

## 2024-08-03 NOTE — PLAN OF CARE
Problem: Pain Chronic (Persistent)  Goal: Optimal Pain Control and Function  Outcome: Not Progressing  Intervention: Develop Pain Management Plan  Recent Flowsheet Documentation  Taken 8/3/2024 1202 by Kylie Finnegan, RN  Pain Management Interventions:   cold applied   aromatherapy  Intervention: Manage Persistent Pain  Recent Flowsheet Documentation  Taken 8/3/2024 0818 by Kylie Finnegan, RN  Medication Review/Management: medications reviewed     Problem: Chest Pain  Goal: Resolution of Chest Pain Symptoms  Outcome: Progressing   Goal Outcome Evaluation:  Pt had no reoccurrance of chest pain/ discomfort this shift.  However, pt still c/o of persistant migraine headache; located posterior head/ neck.  Also c/o tingling on right side of head and tingling in right ear, and feeling very fatigued. Having difficulty sleeping.  MD aware; orders for PO caffiene, PO Benadryl, Tylenol 975mg PO, Mag 400mg PO, Zofran 4mg and Inderal 5mg PO now.  Continued with ice packs to neck/ head.   MRI of head ordered to be done today.  Continue to monitor level of pain and neuro status.

## 2024-08-04 VITALS
BODY MASS INDEX: 23.15 KG/M2 | SYSTOLIC BLOOD PRESSURE: 110 MMHG | HEIGHT: 70 IN | DIASTOLIC BLOOD PRESSURE: 53 MMHG | TEMPERATURE: 98.5 F | OXYGEN SATURATION: 98 % | RESPIRATION RATE: 16 BRPM | HEART RATE: 68 BPM | WEIGHT: 161.7 LBS

## 2024-08-04 PROCEDURE — 250N000013 HC RX MED GY IP 250 OP 250 PS 637: Performed by: STUDENT IN AN ORGANIZED HEALTH CARE EDUCATION/TRAINING PROGRAM

## 2024-08-04 PROCEDURE — G0378 HOSPITAL OBSERVATION PER HR: HCPCS

## 2024-08-04 PROCEDURE — 99239 HOSP IP/OBS DSCHRG MGMT >30: CPT | Performed by: STUDENT IN AN ORGANIZED HEALTH CARE EDUCATION/TRAINING PROGRAM

## 2024-08-04 RX ORDER — PROPRANOLOL HYDROCHLORIDE 10 MG/1
5 TABLET ORAL 2 TIMES DAILY
Qty: 30 TABLET | Refills: 0 | Status: SHIPPED | OUTPATIENT
Start: 2024-08-04 | End: 2024-08-08

## 2024-08-04 RX ORDER — PANTOPRAZOLE SODIUM 40 MG/1
40 TABLET, DELAYED RELEASE ORAL
Qty: 7 TABLET | Refills: 0 | Status: SHIPPED | OUTPATIENT
Start: 2024-08-05 | End: 2024-08-19

## 2024-08-04 RX ORDER — MAGNESIUM OXIDE 400 MG/1
400 TABLET ORAL DAILY
Qty: 30 TABLET | Refills: 0 | Status: SHIPPED | OUTPATIENT
Start: 2024-08-05

## 2024-08-04 RX ADMIN — PROPRANOLOL HYDROCHLORIDE 5 MG: 10 TABLET ORAL at 07:56

## 2024-08-04 RX ADMIN — Medication 100 MCG: at 08:02

## 2024-08-04 RX ADMIN — Medication 400 MG: at 07:56

## 2024-08-04 RX ADMIN — PANTOPRAZOLE SODIUM 40 MG: 40 TABLET, DELAYED RELEASE ORAL at 07:56

## 2024-08-04 RX ADMIN — DOCUSATE SODIUM 100 MG: 100 CAPSULE, LIQUID FILLED ORAL at 08:02

## 2024-08-04 ASSESSMENT — ACTIVITIES OF DAILY LIVING (ADL)
ADLS_ACUITY_SCORE: 20

## 2024-08-04 NOTE — PROGRESS NOTES
DISCHARGE    Patient appropriate for discharge. PIV was removed. AVS, prescriptions, and follow-up education reviewed. Pain is denied. Patient ambulates independently. All belongings remain with patient. Patient left the unit at 1135 via wheelchair with staff. Transportation to home with .    Bre Millan RN

## 2024-08-04 NOTE — DISCHARGE SUMMARY
[facility]  Hospitalist Discharge Summary      Date of Admission:  8/2/2024  Date of Discharge:  8/4/2024  Discharging Provider: ANANT SORIANO MD  Discharge Service: Hospitalist Service    Discharge Diagnoses   Chest tightness (resolved)  ST depression in inferior leads (resolved)  RBBB (resolved)  Likely due to frequent use of sumatriptan  Sumatriptan no more than 3 time a week     Esophageal errosion?  Chest pain radiating to stomach and back, bilateral shoulders.  Likely due to taking too much ibuprofen  -Start pantoprazole 40 mg p.o. discharged with a-week supply  -Follow up with PCP     Headache  Tingling  She had migraine for the past week+.  She took about 1 sumatriptan each day and 3 to 4 pills of 200 mg ibuprofen 1-2 times a day in the past week.  She used to take amitriptyline for prevention but has been off it since few years ago.  She has tingling on her face, bilateral arms when and before the headache, likely aura?  -MRI brain is negative  -Start propranolol 5mg twice daily  -Start magnesium oxide 400 mg daily  -Hold off ibuprofen and sumatriptan now  -neurology referral     CKD stage 1  Creatinine is 0.96 up from her baseline 0.8-0.9.  eGFR is 75.  This is likely affected by ibuprofen doses.  -Start IV fluid at 150 mL/h for the next 6 hours  -improved       Clinically Significant Risk Factors          Follow-ups Needed After Discharge   Follow-up Appointments     Follow-up and recommended labs and tests       Follow up with primary care provider, Rosemarie Livingston, within 7 days for   hospital follow- up.            Unresulted Labs Ordered in the Past 30 Days of this Admission       No orders found from 7/3/2024 to 8/3/2024.            Discharge Disposition   Discharged to home  Condition at discharge: Stable    Hospital Course   Yeimy Blackmon is a 44 year old female with PMH signficant for migraine.  Presented with chest tightness/burning for a week.  EKG showed ST depression  on inferior leads.  She was given aspirin 325 mg in the ED. More history taking revealed patient's symptoms are likely from frequent sumatriptan and ibuprofen use. EKG looks non-remarkable on repeat.  Her headache persists so MRI was ordered, negative.  She also develops numbness, tingling on the right side of her head, ear, and both forearms likely from migraine with aura. Patient was treated with headache cocktail and initiated with propranolol, magnesium daily for migraine prevention. She did have HR down to 45 with propranolol 10mg bid. She is advised to monitor for low HR and BP. She is referred to neurology for tingling sensation. Her chest burning/pain radiating to the back is also better with pantoprazole.    Consultations This Hospital Stay   PHARMACY IP CONSULT    Code Status   Full Code    Time Spent on this Encounter   I, ANANT SORIANO MD, personally saw the patient today and spent greater than 30 minutes discharging this patient.       ANANT SORIANO MD  [facility] 3 ICU  [address] [place] [id number]  Phone: [phone]  Fax: [phone]  ______________________________________________________________________    Physical Exam   Vital Signs: Temp: 98.5  F (36.9  C) Temp src: Oral BP: 110/53 Pulse: 68   Resp: 16 SpO2: 98 % O2 Device: None (Room air)    Weight: 161 lbs 11.2 oz  General Appearance: Alert and wake, not in distress  Neurology: oriented x 3  Psych: cooperative and calm, normal affect         Primary Care Physician   Rosemarie Livingston    Discharge Orders      Adult Neurology  Referral      Reason for your hospital stay    Chest tightness from likely sumatriptan, ibuprofen  Migraine headache     Follow-up and recommended labs and tests     Follow up with primary care provider, Rosemarie Livingston, within 7 days for hospital follow- up.     Activity    Your activity upon discharge: activity as tolerated     Diet    Follow this diet upon discharge:  Orders Placed This Encounter      Regular Diet Adult       Significant Results and Procedures   Most Recent 3 CBC's:  Recent Labs   Lab Test 08/03/24  0424 08/02/24  1254 02/26/24  1301   WBC 8.3 9.3 6.5   HGB 12.0 14.7 12.9   MCV 89 89 92    362 328     Most Recent 3 BMP's:  Recent Labs   Lab Test 08/03/24  1352 08/03/24  0424 08/02/24  1254    141 140   POTASSIUM 4.5 4.5 4.4   CHLORIDE 107 109* 106   CO2 26 26 24   BUN 8.0 8.5 8.4   CR 0.86 0.93 0.96*   ANIONGAP 7 6* 10   CORDELIA 9.2 9.3 10.2   GLC 96 97 109*     Most Recent 2 LFT's:  Recent Labs   Lab Test 08/03/24  0424 08/14/23  1318   AST 16 22   ALT 16 21   ALKPHOS 55 55   BILITOTAL 0.5 0.3   ,   Results for orders placed or performed during the hospital encounter of 08/02/24   XR Chest Port 1 View    Narrative    EXAM: XR CHEST PORT 1 VIEW  LOCATION: [facility]  DATE: 8/2/2024    INDICATION: Chest Pain.  COMPARISON: None.      Impression    IMPRESSION: Negative chest.   MR Brain w/o Contrast    Narrative    EXAM: MR BRAIN W/O CONTRAST  LOCATION: [facility]  DATE: 8/3/2024    INDICATION: Acute on chronic headache with refractory debilitating pain. Tingling/numbness of head, neck, arm.  COMPARISON: None.  TECHNIQUE: Routine multiplanar multisequence head MRI without intravenous contrast.    FINDINGS:  INTRACRANIAL CONTENTS: No acute/subacute infarct, acute hemorrhage, extra-axial fluid collection, or mass. Normal brain parenchymal signal. Normal ventricles and sulci for age. Normal position of the cerebellar tonsils.     SELLA: Within technique limitations, no gross abnormality.    OSSEOUS STRUCTURES/SOFT TISSUES: No suspicious bone marrow signal intensity. The major intracranial vascular flow voids are maintained.     SINUSES/MASTOIDS: No evidence of significant paranasal sinus or mastoid mucosal disease.        Impression    IMPRESSION:  1.  Unremarkable head MRI.         Discharge Medications    Current Discharge Medication List        START taking these medications    Details   magnesium oxide (MAG-OX) 400 MG tablet Take 1 tablet (400 mg) by mouth daily  Qty: 30 tablet, Refills: 0    Associated Diagnoses: Migraine without aura and without status migrainosus, not intractable      pantoprazole (PROTONIX) 40 MG EC tablet Take 1 tablet (40 mg) by mouth every morning (before breakfast)  Qty: 7 tablet, Refills: 0    Associated Diagnoses: Esophageal erosions      propranolol (INDERAL) 10 MG tablet Take 0.5 tablets (5 mg) by mouth 2 times daily  Qty: 30 tablet, Refills: 0    Associated Diagnoses: Migraine without aura and without status migrainosus, not intractable           CONTINUE these medications which have NOT CHANGED    Details   acetaminophen-caffeine (EXCEDRIN TENSION HEADACHE) 500-65 MG TABS Take 1-2 tablets by mouth every 6 hours as needed for mild pain      levonorgestrel-ethinyl estradiol (SEASONALE) 0.15-0.03 MG tablet Take 1 tablet by mouth daily  Qty: 84 tablet, Refills: 4    Associated Diagnoses: Irregular menses      multivitamin, therapeutic (THERA-VIT) TABS tablet Take 1 tablet by mouth daily      SUMAtriptan (IMITREX) 50 MG tablet Take 1 tablet (50 mg) by mouth at onset of headache for migraine  Qty: 30 tablet, Refills: 5    Associated Diagnoses: Other migraine without status migrainosus, not intractable           STOP taking these medications       ibuprofen (ADVIL/MOTRIN) 200 MG tablet Comments:   Reason for Stopping:             Allergies   No Known Allergies

## 2024-08-04 NOTE — PROGRESS NOTES
PRIMARY DIAGNOSIS: CHEST PAIN  OUTPATIENT/OBSERVATION GOALS TO BE MET BEFORE DISCHARGE:  1. Ruled out acute coronary syndrome (negative or stable Troponin):  Yes  2. Pain Status: Improved with use of alternative comfort measures i.e.: rest  3. Appropriate provocative testing performed: Yes  - Stress Test Procedure: N/A  - Interpretation of cardiac rhythm per telemetry tech: Simus dysrhytmia, rate 49    4. Cleared by Consultants (if applicable):Yes  5. Return to near baseline physical activity: Yes  Pt oriented x4. Endorsing that migraine is much better (2/10). No new numbness or tingling, no chest pain overnight. Did endorse some new aching in legs, relieved with rest. VSS. Tele SR, sinus lyric (45-55 while asleep) and occasionally dysrhythmic.

## 2024-08-04 NOTE — PROGRESS NOTES
PRIMARY DIAGNOSIS: CHEST PAIN  OUTPATIENT/OBSERVATION GOALS TO BE MET BEFORE DISCHARGE:  1. Ruled out acute coronary syndrome (negative or stable Troponin):  Yes  2. Pain Status: Improved with use of alternative comfort measures i.e.: rest  3. Appropriate provocative testing performed: Yes  - Stress Test Procedure: N/A  - Interpretation of cardiac rhythm per telemetry tech: Sinus lyric    4. Cleared by Consultants (if applicable):N/A  5. Return to near baseline physical activity: Yes       The patient is a 69y Female complaining of shortness of breath.

## 2024-08-04 NOTE — PROGRESS NOTES
PRIMARY DIAGNOSIS: CHEST PAIN  OUTPATIENT/OBSERVATION GOALS TO BE MET BEFORE DISCHARGE:  1. Ruled out acute coronary syndrome (negative or stable Troponin):  Yes  2. Pain Status: Improved with use of alternative comfort measures i.e.: positioning  3. Appropriate provocative testing performed: Yes  - Stress Test Procedure: N/A  - Interpretation of cardiac rhythm per telemetry tech: SR    4. Cleared by Consultants (if applicable):N/A  5. Return to near baseline physical activity: Yes

## 2024-08-05 LAB
ATRIAL RATE - MUSE: 65 BPM
DIASTOLIC BLOOD PRESSURE - MUSE: NORMAL MMHG
INTERPRETATION ECG - MUSE: NORMAL
P AXIS - MUSE: 65 DEGREES
PR INTERVAL - MUSE: 140 MS
QRS DURATION - MUSE: 100 MS
QT - MUSE: 416 MS
QTC - MUSE: 432 MS
R AXIS - MUSE: 91 DEGREES
SYSTOLIC BLOOD PRESSURE - MUSE: NORMAL MMHG
T AXIS - MUSE: 61 DEGREES
VENTRICULAR RATE- MUSE: 65 BPM

## 2024-08-05 PROCEDURE — 93010 ELECTROCARDIOGRAM REPORT: CPT | Mod: RTG | Performed by: INTERNAL MEDICINE

## 2024-08-08 ENCOUNTER — MYC MEDICAL ADVICE (OUTPATIENT)
Dept: NEUROLOGY | Facility: CLINIC | Age: 44
End: 2024-08-08

## 2024-08-08 ENCOUNTER — OFFICE VISIT (OUTPATIENT)
Dept: NEUROLOGY | Facility: CLINIC | Age: 44
End: 2024-08-08
Payer: COMMERCIAL

## 2024-08-08 VITALS
WEIGHT: 148 LBS | HEIGHT: 70 IN | DIASTOLIC BLOOD PRESSURE: 67 MMHG | HEART RATE: 83 BPM | SYSTOLIC BLOOD PRESSURE: 115 MMHG | BODY MASS INDEX: 21.19 KG/M2

## 2024-08-08 DIAGNOSIS — G43.009 MIGRAINE WITHOUT AURA AND WITHOUT STATUS MIGRAINOSUS, NOT INTRACTABLE: Primary | ICD-10-CM

## 2024-08-08 DIAGNOSIS — G43.009 MIGRAINE WITHOUT AURA AND WITHOUT STATUS MIGRAINOSUS, NOT INTRACTABLE: ICD-10-CM

## 2024-08-08 PROBLEM — Z87.448 HISTORY OF PYELONEPHRITIS: Status: RESOLVED | Noted: 2023-03-23 | Resolved: 2024-08-08

## 2024-08-08 PROBLEM — M79.89 MASS OF SOFT TISSUE OF FOOT: Status: RESOLVED | Noted: 2022-08-16 | Resolved: 2024-08-08

## 2024-08-08 PROBLEM — R07.89 CHEST PRESSURE: Status: RESOLVED | Noted: 2024-08-02 | Resolved: 2024-08-08

## 2024-08-08 PROBLEM — M76.61 ACHILLES TENDINITIS OF RIGHT LOWER EXTREMITY: Status: RESOLVED | Noted: 2022-08-16 | Resolved: 2024-08-08

## 2024-08-08 PROCEDURE — 99205 OFFICE O/P NEW HI 60 MIN: CPT | Performed by: PSYCHIATRY & NEUROLOGY

## 2024-08-08 PROCEDURE — G2211 COMPLEX E/M VISIT ADD ON: HCPCS | Performed by: PSYCHIATRY & NEUROLOGY

## 2024-08-08 RX ORDER — PREDNISONE 10 MG/1
TABLET ORAL
Qty: 30 TABLET | Refills: 0 | Status: SHIPPED | OUTPATIENT
Start: 2024-08-08 | End: 2024-08-17

## 2024-08-08 ASSESSMENT — HEADACHE IMPACT TEST (HIT 6)
HIT6 TOTAL SCORE: 66
HIT6 TOTAL SCORE: 66
HOW OFTEN DID HEADACHS LIMIT CONCENTRATION ON WORK OR DAILY ACTIVITY: VERY OFTEN
WHEN YOU HAVE HEADACHES HOW OFTEN IS THE PAIN SEVERE: VERY OFTEN
HOW OFTEN HAVE YOU FELT FED UP OR IRRITATED BECAUSE OF YOUR HEADACHES: VERY OFTEN
HOW OFTEN DID HEADACHS LIMIT CONCENTRATION ON WORK OR DAILY ACTIVITY: VERY OFTEN
HOW OFTEN DO HEADACHES LIMIT YOUR DAILY ACTIVITIES: VERY OFTEN
HOW OFTEN DO HEADACHES LIMIT YOUR DAILY ACTIVITIES: VERY OFTEN
WHEN YOU HAVE HEADACHES HOW OFTEN IS THE PAIN SEVERE: VERY OFTEN
HOW OFTEN HAVE YOU FELT TOO TIRED TO WORK BECAUSE OF YOUR HEADACHES: VERY OFTEN
HOW OFTEN HAVE YOU FELT FED UP OR IRRITATED BECAUSE OF YOUR HEADACHES: VERY OFTEN
WHEN YOU HAVE A HEADACHE HOW OFTEN DO YOU WISH YOU COULD LIE DOWN: VERY OFTEN
HOW OFTEN HAVE YOU FELT TOO TIRED TO WORK BECAUSE OF YOUR HEADACHES: VERY OFTEN
WHEN YOU HAVE A HEADACHE HOW OFTEN DO YOU WISH YOU COULD LIE DOWN: VERY OFTEN

## 2024-08-08 NOTE — LETTER
2024      Yeimy Blackmon  [address]  [place] [facility] [address]      Dear Colleague,    Thank you for referring your patient, Yeimy Blackmon, to the [facility] [place]. Please see a copy of my visit note below.    NEUROLOGY CONSULTATION NOTE       [facility] [place]  [address] [place] [address]  Tel: [phone]  Fax: [phone]  [email or web address]     Yeimy Blackmon,  1980, MRN [id number]  PCP: Rosemarie Livingston  Date: 2024     ASSESSMENT & PLAN     Visit Diagnosis  Migraine without aura and with status migrainosus, not intractable     Migraine headache without aura  44-year-old female with history of perimenstrual migraine who recently has developed status migrainosus.  I have recommended:    1.  Prednisone 50 mg daily tapering it down in 10 days  2.  Discontinue Inderal  3.  Continue magnesium  4.  Previously patient had tried Inderal, amitriptyline, nortriptyline, Imitrex and continues to be symptomatic and I have switched to Emgality loading dose 240 mg followed by 120 mg every 28 days  5.  Ubrelvy for abortive treatment  6.  Follow-up in 4 months    Thank you again for this referral, please feel free to contact me if you have any questions.    Luis F Dubose MD  [facility] NEUROLOGY, [place]     REASON FOR CONSULTATION Migraine        HISTORY OF PRESENT ILLNESS     We have been requested by Miki to evaluate Yeimy Blackmon who is a 44 year old  female for migraine    Patient is a 44-year-old female with history of migraine headache was referred for evaluation of progressively worsening headache along with light sensitivity.  According to patient she developed migraine headache when she was 4.  She used to get these headaches close to her.  And would take over-the-counter medication.  She was started on amitriptyline, nortriptyline in the past and as she was trying to get pregnant in  she  stopped taking those medication.  She eventually got pregnant in 2016 and fortunately had no flareup of her headaches.  She again got pregnant in 2018 but noticed a flareup of her headache and since then has progressively noticed worsening headaches.  These headaches come without any warning.  They are mostly clustered close to her menstrual cycle.  She has some light sensitivity and nausea but denies any focal weakness or numbness.  She usually takes Imitrex and ibuprofen that helps.  Recently her headache frequency gradually increased and she had a persistent headache along with some chest pain and eventually ended up in the [facility] emergency room where she was noted to have some EKG changes that were felt to be due to Imitrex and was told to discontinue.  She was started on Inderal that resulted in decrease in her pulse rate and was also started on magnesium and asked to follow-up with neurology.  Other than menstrual cycle she cannot identify any triggers for her symptoms.  She is noticing almost consistent headache for the last 2 weeks which is somewhat unusual for her.  She had a MRI of the brain that was unremarkable.     PROBLEM LIST   Patient Active Problem List   Diagnosis     Migraine Headache     History of kidney stones     Ureterolithiasis     Unstable angina (H)     Acute electrocardiogram changes         PAST MEDICAL & SURGICAL HISTORY     Past Medical History:   Patient  has a past medical history of De Quervain's syndrome (tenosynovitis) (09/01/2019), 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, Kidney stone (05/24/2022), Migraine, Normal delivery (11/01/2016), and Normal delivery (02/03/2019).    Surgical History:  She  has a past surgical history that includes cystoscopy with right ureteral stent placement (05/25/2022).     SOCIAL HISTORY     Reviewed, and she  reports that she has never smoked. She has never been exposed to tobacco smoke. She has never used smokeless tobacco. She reports that she does  not drink alcohol and does not use drugs.     FAMILY HISTORY     Reviewed, and family history includes Brain Cancer in her maternal grandmother; Cancer in her maternal uncle; Early Death in her maternal grandfather; Heart Disease in her maternal grandmother; No Known Problems in her father; Rheumatoid Arthritis in her maternal uncle, maternal uncle, and mother.     ALLERGIES     No Known Allergies      REVIEW OF SYSTEMS     A 12 point review of system was performed and was negative except as outlined in the history of present illness.     HOME MEDICATIONS     Current Outpatient Rx   Medication Sig Dispense Refill     acetaminophen-caffeine (EXCEDRIN TENSION HEADACHE) 500-65 MG TABS Take 1-2 tablets by mouth every 6 hours as needed for mild pain       [START ON 9/6/2024] galcanezumab-gnlm (EMGALITY) 120 MG/ML injection Inject 1 mL (120 mg) subcutaneously every 28 days 1 mL 11     galcanezumab-gnlm (EMGALITY) 120 MG/ML injection Inject 2 mLs (240 mg) subcutaneously once for 1 dose 2 mL 0     levonorgestrel-ethinyl estradiol (SEASONALE) 0.15-0.03 MG tablet Take 1 tablet by mouth daily 84 tablet 4     magnesium oxide (MAG-OX) 400 MG tablet Take 1 tablet (400 mg) by mouth daily 30 tablet 0     multivitamin, therapeutic (THERA-VIT) TABS tablet Take 1 tablet by mouth daily       pantoprazole (PROTONIX) 40 MG EC tablet Take 1 tablet (40 mg) by mouth every morning (before breakfast) 7 tablet 0     predniSONE (DELTASONE) 10 MG tablet Take 5 tablets (50 mg) by mouth daily for 2 days, THEN 4 tablets (40 mg) daily for 2 days, THEN 3 tablets (30 mg) daily for 2 days, THEN 2 tablets (20 mg) daily for 2 days, THEN 1 tablet (10 mg) daily for 2 days. 5 p.o. daily x2 days, then 4 p.o. daily x2 days, then 3 p.o. daily x2 days, then 2 p.o. daily x2 days, then 1 p.o. daily x2 days and then stop 30 tablet 0     ubrogepant (UBRELVY) 50 MG tablet Take 1 tablet (50 mg) by mouth at onset of headache (Migraine headache, may repeat x1 in 12  "hours.  Maximum 2 tablets in 24 hours) 15 tablet 3         PHYSICAL EXAM     Vital signs  /67 (BP Location: Right arm, Patient Position: Sitting)   Pulse 83   Ht 1.778 m (5' 10\")   Wt 67.1 kg (148 lb)   LMP 07/23/2024 (Approximate)   BMI 21.24 kg/m      Weight:   148 lbs 0 oz    Patient is alert and oriented x4 in no acute distress. Vital signs were reviewed and are documented in electronic medical record. Neck was supple, no carotid bruits, thyromegaly, JVD, or lymphadenopathy was noted.   NEUROLOGY EXAM:    Patient s speech was normal with no aphasia or dysarthria. Mentation, and affect were also normal.     Funduscopic exam was normal, with normal cup to disc ratio. Cranial nerves II -XII were intact.     Patient had normal mass, tone and motor strength was 5/5 in all extremities without pronator drift.     Sensation was intact to light touch, pinprick, and vibratory sensation.     Reflexes were 1+ symmetrical with downgoing toes.     No dysmetria noted on FNF or HKS. Romberg was negative.    Gait testing was normal. Able to walk on toes/heels. Tandem walk normal.     PERTINENT DIAGNOSTIC STUDIES     Following studies were reviewed:     MRI BRAIN 8/3/2024  Unremarkable head MRI     PERTINENT LABS  Following labs were reviewed:  Admission on 08/02/2024, Discharged on 08/04/2024   Component Date Value Ref Range Status     Sodium 08/02/2024 140  135 - 145 mmol/L Final     Potassium 08/02/2024 4.4  3.4 - 5.3 mmol/L Final     Chloride 08/02/2024 106  98 - 107 mmol/L Final     Carbon Dioxide (CO2) 08/02/2024 24  22 - 29 mmol/L Final     Anion Gap 08/02/2024 10  7 - 15 mmol/L Final     Urea Nitrogen 08/02/2024 8.4  6.0 - 20.0 mg/dL Final     Creatinine 08/02/2024 0.96 (H)  0.51 - 0.95 mg/dL Final     GFR Estimate 08/02/2024 75  >60 mL/min/1.73m2 Final     Calcium 08/02/2024 10.2  8.8 - 10.4 mg/dL Final     Glucose 08/02/2024 109 (H)  70 - 99 mg/dL Final     Troponin T, High Sensitivity 08/02/2024 <6  <=14 " ng/L Final     Ventricular Rate 08/02/2024 121  BPM Final     Atrial Rate 08/02/2024 121  BPM Final     VA Interval 08/02/2024 156  ms Final     QRS Duration 08/02/2024 108  ms Final     QT 08/02/2024 322  ms Final     QTc 08/02/2024 457  ms Final     P Axis 08/02/2024 75  degrees Final     R AXIS 08/02/2024 138  degrees Final     T Axis 08/02/2024 32  degrees Final     Interpretation ECG 08/02/2024    Final                    Value:Sinus tachycardia  Biatrial enlargement  Incomplete right bundle branch block  Possible Inferior infarct , age undetermined  Possible Anterolateral infarct , age undetermined  Abnormal ECG  No previous ECGs available  Confirmed by SEE ED PROVIDER NOTE FOR, ECG INTERPRETATION ([id number]),  JESSIKA BUENO ([id number]) on 8/2/2024 2:13:08 PM       WBC Count 08/02/2024 9.3  4.0 - 11.0 10e3/uL Final     RBC Count 08/02/2024 4.83  3.80 - 5.20 10e6/uL Final     Hemoglobin 08/02/2024 14.7  11.7 - 15.7 g/dL Final     Hematocrit 08/02/2024 42.8  35.0 - 47.0 % Final     MCV 08/02/2024 89  78 - 100 fL Final     MCH 08/02/2024 30.4  26.5 - 33.0 pg Final     MCHC 08/02/2024 34.3  31.5 - 36.5 g/dL Final     RDW 08/02/2024 12.3  10.0 - 15.0 % Final     Platelet Count 08/02/2024 362  150 - 450 10e3/uL Final     % Neutrophils 08/02/2024 74  % Final     % Lymphocytes 08/02/2024 19  % Final     % Monocytes 08/02/2024 6  % Final     % Eosinophils 08/02/2024 0  % Final     % Basophils 08/02/2024 0  % Final     % Immature Granulocytes 08/02/2024 0  % Final     NRBCs per 100 WBC 08/02/2024 0  <1 /100 Final     Absolute Neutrophils 08/02/2024 6.9  1.6 - 8.3 10e3/uL Final     Absolute Lymphocytes 08/02/2024 1.8  0.8 - 5.3 10e3/uL Final     Absolute Monocytes 08/02/2024 0.5  0.0 - 1.3 10e3/uL Final     Absolute Eosinophils 08/02/2024 0.0  0.0 - 0.7 10e3/uL Final     Absolute Basophils 08/02/2024 0.0  0.0 - 0.2 10e3/uL Final     Absolute Immature Granulocytes 08/02/2024 0.0  <=0.4 10e3/uL Final     Absolute NRBCs  08/02/2024 0.0  10e3/uL Final     Hold Specimen 08/02/2024 JIC   Final     D-Dimer Quantitative 08/02/2024 <=0.27  0.00 - 0.50 ug/mL FEU Final     hCG Serum Qualitative 08/02/2024 Negative  Negative Final     Troponin T, High Sensitivity 08/02/2024 <6  <=14 ng/L Final     TSH 08/02/2024 1.15  0.30 - 4.20 uIU/mL Final     Hemoglobin A1C 08/02/2024 5.4  <5.7 % Final     Ventricular Rate 08/02/2024 65  BPM Final     Atrial Rate 08/02/2024 65  BPM Final     DE Interval 08/02/2024 140  ms Final     QRS Duration 08/02/2024 100  ms Final     QT 08/02/2024 416  ms Final     QTc 08/02/2024 432  ms Final     P Axis 08/02/2024 65  degrees Final     R AXIS 08/02/2024 91  degrees Final     T Axis 08/02/2024 61  degrees Final     Interpretation ECG 08/02/2024    Final                    Value:Sinus rhythm  Rightward axis  Borderline ECG  When compared with ECG of 02-Aug-2024 12:47,  Vent. rate has decreased by  56 bpm  Incomplete right bundle branch block is no longer Present  Borderline criteria for Anterior infarct are no longer Present  Borderline criteria for Anterolateral infarct are no longer Present  Borderline criteria for Inferior infarct are no longer Present  Confirmed by KENA KULKARNI, SKYLAR LOC:JN ([id number]) on 8/5/2024 2:13:26 PM       Sodium 08/03/2024 141  135 - 145 mmol/L Final     Potassium 08/03/2024 4.5  3.4 - 5.3 mmol/L Final     Chloride 08/03/2024 109 (H)  98 - 107 mmol/L Final     Carbon Dioxide (CO2) 08/03/2024 26  22 - 29 mmol/L Final     Anion Gap 08/03/2024 6 (L)  7 - 15 mmol/L Final     Urea Nitrogen 08/03/2024 8.5  6.0 - 20.0 mg/dL Final     Creatinine 08/03/2024 0.93  0.51 - 0.95 mg/dL Final     GFR Estimate 08/03/2024 78  >60 mL/min/1.73m2 Final     Calcium 08/03/2024 9.3  8.8 - 10.4 mg/dL Final     Glucose 08/03/2024 97  70 - 99 mg/dL Final     Protein Total 08/03/2024 6.1 (L)  6.4 - 8.3 g/dL Final     Albumin 08/03/2024 3.9  3.5 - 5.2 g/dL Final     Bilirubin Total 08/03/2024 0.5  <=1.2 mg/dL Final      Alkaline Phosphatase 08/03/2024 55  40 - 150 U/L Final     AST 08/03/2024 16  0 - 45 U/L Final     ALT 08/03/2024 16  0 - 50 U/L Final     Bilirubin Direct 08/03/2024 <0.20  0.00 - 0.30 mg/dL Final     WBC Count 08/03/2024 8.3  4.0 - 11.0 10e3/uL Final     RBC Count 08/03/2024 3.99  3.80 - 5.20 10e6/uL Final     Hemoglobin 08/03/2024 12.0  11.7 - 15.7 g/dL Final     Hematocrit 08/03/2024 35.6  35.0 - 47.0 % Final     MCV 08/03/2024 89  78 - 100 fL Final     MCH 08/03/2024 30.1  26.5 - 33.0 pg Final     MCHC 08/03/2024 33.7  31.5 - 36.5 g/dL Final     RDW 08/03/2024 12.2  10.0 - 15.0 % Final     Platelet Count 08/03/2024 276  150 - 450 10e3/uL Final     Cholesterol 08/03/2024 141  <200 mg/dL Final     Triglycerides 08/03/2024 63  <150 mg/dL Final     Direct Measure HDL 08/03/2024 45 (L)  >=50 mg/dL Final     LDL Cholesterol Calculated 08/03/2024 83  <=100 mg/dL Final     Non HDL Cholesterol 08/03/2024 96  <130 mg/dL Final     Patient Fasting > 8hrs? 08/03/2024 Yes   Final     pH Venous 08/03/2024 7.38  7.32 - 7.43 Final     pCO2 Venous 08/03/2024 47  40 - 50 mm Hg Final     pO2 Venous 08/03/2024 29  25 - 47 mm Hg Final     Bicarbonate Venous 08/03/2024 28  21 - 28 mmol/L Final     Base Excess/Deficit Venous 08/03/2024 2.6  -3.0 - 3.0 mmol/L Final     FIO2 08/03/2024 21   Final     Oxyhemoglobin Venous 08/03/2024 50 (L)  70 - 75 % Final     O2 Sat, Venous 08/03/2024 51.2 (L)  70.0 - 75.0 % Final     Sodium 08/03/2024 140  135 - 145 mmol/L Final     Potassium 08/03/2024 4.5  3.4 - 5.3 mmol/L Final     Chloride 08/03/2024 107  98 - 107 mmol/L Final     Carbon Dioxide (CO2) 08/03/2024 26  22 - 29 mmol/L Final     Anion Gap 08/03/2024 7  7 - 15 mmol/L Final     Urea Nitrogen 08/03/2024 8.0  6.0 - 20.0 mg/dL Final     Creatinine 08/03/2024 0.86  0.51 - 0.95 mg/dL Final     GFR Estimate 08/03/2024 85  >60 mL/min/1.73m2 Final     Calcium 08/03/2024 9.2  8.8 - 10.4 mg/dL Final     Glucose 08/03/2024 96  70 - 99 mg/dL Final      Hold Specimen 08/03/2024 [facility]   Final     Vitamin B12 08/03/2024 558  232 - 1,245 pg/mL Final   Lab on 07/23/2024   Component Date Value Ref Range Status     Color Urine 07/23/2024 Yellow  Colorless, Straw, Light Yellow, Yellow Final     Appearance Urine 07/23/2024 Clear  Clear Final     Glucose Urine 07/23/2024 Negative  Negative mg/dL Final     Bilirubin Urine 07/23/2024 Negative  Negative Final     Ketones Urine 07/23/2024 Negative  Negative mg/dL Final     Specific Gravity Urine 07/23/2024 1.010  1.005 - 1.030 Final     Blood Urine 07/23/2024 Moderate (A)  Negative Final     pH Urine 07/23/2024 6.0  5.0 - 8.0 Final     Protein Albumin Urine 07/23/2024 Negative  Negative mg/dL Final     Urobilinogen Urine 07/23/2024 0.2  0.2, 1.0 E.U./dL Final     Nitrite Urine 07/23/2024 Negative  Negative Final     Leukocyte Esterase Urine 07/23/2024 Trace (A)  Negative Final     Trichomonas 07/23/2024 Absent  Absent Final     Yeast 07/23/2024 Absent  Absent Final     Clue Cells 07/23/2024 Absent  Absent Final     WBCs/high power field 07/23/2024 2+ (A)  None Final     Bacteria Urine 07/23/2024 Few (A)  None Seen /HPF Final     RBC Urine 07/23/2024 2-5 (A)  0-2 /HPF /HPF Final     WBC Urine 07/23/2024 0-5  0-5 /HPF /HPF Final     Squamous Epithelials Urine 07/23/2024 Few (A)  None Seen /LPF Final        Total time spent for face to face visit, reviewing labs/imaging studies, counseling and coordination of care was: 1 Hour spent on the date of the encounter doing chart review, review of outside records, review of test results, interpretation of tests, patient visit, and documentation     The longitudinal plan of care for the diagnosis(es)/condition(s) as documented were addressed during this visit. Due to the added complexity in care, I will continue to support Yeimy in the subsequent management and with ongoing continuity of care.    This note was dictated using voice recognition software.  Any grammatical or context  distortions are unintentional and inherent to the software.    No orders of the defined types were placed in this encounter.     New Prescriptions    GALCANEZUMAB-GNLM (EMGALITY) 120 MG/ML INJECTION    Inject 1 mL (120 mg) subcutaneously every 28 days    GALCANEZUMAB-GNLM (EMGALITY) 120 MG/ML INJECTION    Inject 2 mLs (240 mg) subcutaneously once for 1 dose    PREDNISONE (DELTASONE) 10 MG TABLET    Take 5 tablets (50 mg) by mouth daily for 2 days, THEN 4 tablets (40 mg) daily for 2 days, THEN 3 tablets (30 mg) daily for 2 days, THEN 2 tablets (20 mg) daily for 2 days, THEN 1 tablet (10 mg) daily for 2 days. 5 p.o. daily x2 days, then 4 p.o. daily x2 days, then 3 p.o. daily x2 days, then 2 p.o. daily x2 days, then 1 p.o. daily x2 days and then stop    UBROGEPANT (UBRELVY) 50 MG TABLET    Take 1 tablet (50 mg) by mouth at onset of headache (Migraine headache, may repeat x1 in 12 hours.  Maximum 2 tablets in 24 hours)      Modified Medications    No medications on file                Again, thank you for allowing me to participate in the care of your patient.        Sincerely,        Luis F Dubose MD

## 2024-08-08 NOTE — PROGRESS NOTES
NEUROLOGY CONSULTATION NOTE       [facility] [place]  [address] [place] [address]  Tel: [phone]  Fax: [phone]  [email or web address]     Yeimy Blackmon,  1980, MRN [id number]  PCP: Rosemarie Livingston  Date: 2024     ASSESSMENT & PLAN     Visit Diagnosis  Migraine without aura and with status migrainosus, not intractable     Migraine headache without aura  44-year-old female with history of perimenstrual migraine who recently has developed status migrainosus.  I have recommended:    1.  Prednisone 50 mg daily tapering it down in 10 days  2.  Discontinue Inderal  3.  Continue magnesium  4.  Previously patient had tried Inderal, amitriptyline, nortriptyline, Imitrex and continues to be symptomatic and I have switched to Emgality loading dose 240 mg followed by 120 mg every 28 days  5.  Ubrelvy for abortive treatment  6.  Follow-up in 4 months    Thank you again for this referral, please feel free to contact me if you have any questions.    Luis F Dubose MD  [facility], [place]     REASON FOR CONSULTATION Migraine        HISTORY OF PRESENT ILLNESS     We have been requested by Miki to evaluate Yeimy Blackmon who is a 44 year old  female for migraine    Patient is a 44-year-old female with history of migraine headache was referred for evaluation of progressively worsening headache along with light sensitivity.  According to patient she developed migraine headache when she was 4.  She used to get these headaches close to her.  And would take over-the-counter medication.  She was started on amitriptyline, nortriptyline in the past and as she was trying to get pregnant in  she stopped taking those medication.  She eventually got pregnant in  and fortunately had no flareup of her headaches.  She again got pregnant in  but noticed a flareup of her headache and since then has progressively noticed worsening headaches.  These  headaches come without any warning.  They are mostly clustered close to her menstrual cycle.  She has some light sensitivity and nausea but denies any focal weakness or numbness.  She usually takes Imitrex and ibuprofen that helps.  Recently her headache frequency gradually increased and she had a persistent headache along with some chest pain and eventually ended up in the [facility] emergency room where she was noted to have some EKG changes that were felt to be due to Imitrex and was told to discontinue.  She was started on Inderal that resulted in decrease in her pulse rate and was also started on magnesium and asked to follow-up with neurology.  Other than menstrual cycle she cannot identify any triggers for her symptoms.  She is noticing almost consistent headache for the last 2 weeks which is somewhat unusual for her.  She had a MRI of the brain that was unremarkable.     PROBLEM LIST   Patient Active Problem List   Diagnosis    Migraine Headache    History of kidney stones    Ureterolithiasis    Unstable angina (H)    Acute electrocardiogram changes         PAST MEDICAL & SURGICAL HISTORY     Past Medical History:   Patient  has a past medical history of De Quervain's syndrome (tenosynovitis) (09/01/2019), 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, Kidney stone (05/24/2022), Migraine, Normal delivery (11/01/2016), and Normal delivery (02/03/2019).    Surgical History:  She  has a past surgical history that includes cystoscopy with right ureteral stent placement (05/25/2022).     SOCIAL HISTORY     Reviewed, and she  reports that she has never smoked. She has never been exposed to tobacco smoke. She has never used smokeless tobacco. She reports that she does not drink alcohol and does not use drugs.     FAMILY HISTORY     Reviewed, and family history includes Brain Cancer in her maternal grandmother; Cancer in her maternal uncle; Early Death in her maternal grandfather; Heart Disease in her maternal grandmother; No  "Known Problems in her father; Rheumatoid Arthritis in her maternal uncle, maternal uncle, and mother.     ALLERGIES     No Known Allergies      REVIEW OF SYSTEMS     A 12 point review of system was performed and was negative except as outlined in the history of present illness.     HOME MEDICATIONS     Current Outpatient Rx   Medication Sig Dispense Refill    acetaminophen-caffeine (EXCEDRIN TENSION HEADACHE) 500-65 MG TABS Take 1-2 tablets by mouth every 6 hours as needed for mild pain      [START ON 9/6/2024] galcanezumab-gnlm (EMGALITY) 120 MG/ML injection Inject 1 mL (120 mg) subcutaneously every 28 days 1 mL 11    galcanezumab-gnlm (EMGALITY) 120 MG/ML injection Inject 2 mLs (240 mg) subcutaneously once for 1 dose 2 mL 0    levonorgestrel-ethinyl estradiol (SEASONALE) 0.15-0.03 MG tablet Take 1 tablet by mouth daily 84 tablet 4    magnesium oxide (MAG-OX) 400 MG tablet Take 1 tablet (400 mg) by mouth daily 30 tablet 0    multivitamin, therapeutic (THERA-VIT) TABS tablet Take 1 tablet by mouth daily      pantoprazole (PROTONIX) 40 MG EC tablet Take 1 tablet (40 mg) by mouth every morning (before breakfast) 7 tablet 0    predniSONE (DELTASONE) 10 MG tablet Take 5 tablets (50 mg) by mouth daily for 2 days, THEN 4 tablets (40 mg) daily for 2 days, THEN 3 tablets (30 mg) daily for 2 days, THEN 2 tablets (20 mg) daily for 2 days, THEN 1 tablet (10 mg) daily for 2 days. 5 p.o. daily x2 days, then 4 p.o. daily x2 days, then 3 p.o. daily x2 days, then 2 p.o. daily x2 days, then 1 p.o. daily x2 days and then stop 30 tablet 0    ubrogepant (UBRELVY) 50 MG tablet Take 1 tablet (50 mg) by mouth at onset of headache (Migraine headache, may repeat x1 in 12 hours.  Maximum 2 tablets in 24 hours) 15 tablet 3         PHYSICAL EXAM     Vital signs  /67 (BP Location: Right arm, Patient Position: Sitting)   Pulse 83   Ht 1.778 m (5' 10\")   Wt 67.1 kg (148 lb)   LMP 07/23/2024 (Approximate)   BMI 21.24 kg/m      Weight: "   148 lbs 0 oz    Patient is alert and oriented x4 in no acute distress. Vital signs were reviewed and are documented in electronic medical record. Neck was supple, no carotid bruits, thyromegaly, JVD, or lymphadenopathy was noted.   NEUROLOGY EXAM:   Patient s speech was normal with no aphasia or dysarthria. Mentation, and affect were also normal.    Funduscopic exam was normal, with normal cup to disc ratio. Cranial nerves II -XII were intact.    Patient had normal mass, tone and motor strength was 5/5 in all extremities without pronator drift.    Sensation was intact to light touch, pinprick, and vibratory sensation.    Reflexes were 1+ symmetrical with downgoing toes.    No dysmetria noted on FNF or HKS. Romberg was negative.   Gait testing was normal. Able to walk on toes/heels. Tandem walk normal.     PERTINENT DIAGNOSTIC STUDIES     Following studies were reviewed:     MRI BRAIN 8/3/2024  Unremarkable head MRI     PERTINENT LABS  Following labs were reviewed:  Admission on 08/02/2024, Discharged on 08/04/2024   Component Date Value Ref Range Status    Sodium 08/02/2024 140  135 - 145 mmol/L Final    Potassium 08/02/2024 4.4  3.4 - 5.3 mmol/L Final    Chloride 08/02/2024 106  98 - 107 mmol/L Final    Carbon Dioxide (CO2) 08/02/2024 24  22 - 29 mmol/L Final    Anion Gap 08/02/2024 10  7 - 15 mmol/L Final    Urea Nitrogen 08/02/2024 8.4  6.0 - 20.0 mg/dL Final    Creatinine 08/02/2024 0.96 (H)  0.51 - 0.95 mg/dL Final    GFR Estimate 08/02/2024 75  >60 mL/min/1.73m2 Final    Calcium 08/02/2024 10.2  8.8 - 10.4 mg/dL Final    Glucose 08/02/2024 109 (H)  70 - 99 mg/dL Final    Troponin T, High Sensitivity 08/02/2024 <6  <=14 ng/L Final    Ventricular Rate 08/02/2024 121  BPM Final    Atrial Rate 08/02/2024 121  BPM Final    IL Interval 08/02/2024 156  ms Final    QRS Duration 08/02/2024 108  ms Final    QT 08/02/2024 322  ms Final    QTc 08/02/2024 457  ms Final    P Axis 08/02/2024 75  degrees Final    R AXIS  08/02/2024 138  degrees Final    T Axis 08/02/2024 32  degrees Final    Interpretation ECG 08/02/2024    Final                    Value:Sinus tachycardia  Biatrial enlargement  Incomplete right bundle branch block  Possible Inferior infarct , age undetermined  Possible Anterolateral infarct , age undetermined  Abnormal ECG  No previous ECGs available  Confirmed by SEE ED PROVIDER NOTE FOR, ECG INTERPRETATION ([id number]),  JESSIKA BUENO ([id number]) on 8/2/2024 2:13:08 PM      WBC Count 08/02/2024 9.3  4.0 - 11.0 10e3/uL Final    RBC Count 08/02/2024 4.83  3.80 - 5.20 10e6/uL Final    Hemoglobin 08/02/2024 14.7  11.7 - 15.7 g/dL Final    Hematocrit 08/02/2024 42.8  35.0 - 47.0 % Final    MCV 08/02/2024 89  78 - 100 fL Final    MCH 08/02/2024 30.4  26.5 - 33.0 pg Final    MCHC 08/02/2024 34.3  31.5 - 36.5 g/dL Final    RDW 08/02/2024 12.3  10.0 - 15.0 % Final    Platelet Count 08/02/2024 362  150 - 450 10e3/uL Final    % Neutrophils 08/02/2024 74  % Final    % Lymphocytes 08/02/2024 19  % Final    % Monocytes 08/02/2024 6  % Final    % Eosinophils 08/02/2024 0  % Final    % Basophils 08/02/2024 0  % Final    % Immature Granulocytes 08/02/2024 0  % Final    NRBCs per 100 WBC 08/02/2024 0  <1 /100 Final    Absolute Neutrophils 08/02/2024 6.9  1.6 - 8.3 10e3/uL Final    Absolute Lymphocytes 08/02/2024 1.8  0.8 - 5.3 10e3/uL Final    Absolute Monocytes 08/02/2024 0.5  0.0 - 1.3 10e3/uL Final    Absolute Eosinophils 08/02/2024 0.0  0.0 - 0.7 10e3/uL Final    Absolute Basophils 08/02/2024 0.0  0.0 - 0.2 10e3/uL Final    Absolute Immature Granulocytes 08/02/2024 0.0  <=0.4 10e3/uL Final    Absolute NRBCs 08/02/2024 0.0  10e3/uL Final    Hold Specimen 08/02/2024 JIC   Final    D-Dimer Quantitative 08/02/2024 <=0.27  0.00 - 0.50 ug/mL FEU Final    hCG Serum Qualitative 08/02/2024 Negative  Negative Final    Troponin T, High Sensitivity 08/02/2024 <6  <=14 ng/L Final    TSH 08/02/2024 1.15  0.30 - 4.20 uIU/mL Final    Hemoglobin  A1C 08/02/2024 5.4  <5.7 % Final    Ventricular Rate 08/02/2024 65  BPM Final    Atrial Rate 08/02/2024 65  BPM Final    MT Interval 08/02/2024 140  ms Final    QRS Duration 08/02/2024 100  ms Final    QT 08/02/2024 416  ms Final    QTc 08/02/2024 432  ms Final    P Axis 08/02/2024 65  degrees Final    R AXIS 08/02/2024 91  degrees Final    T Axis 08/02/2024 61  degrees Final    Interpretation ECG 08/02/2024    Final                    Value:Sinus rhythm  Rightward axis  Borderline ECG  When compared with ECG of 02-Aug-2024 12:47,  Vent. rate has decreased by  56 bpm  Incomplete right bundle branch block is no longer Present  Borderline criteria for Anterior infarct are no longer Present  Borderline criteria for Anterolateral infarct are no longer Present  Borderline criteria for Inferior infarct are no longer Present  Confirmed by KENA KULKARNI, LES LOC: ([id number]) on 8/5/2024 2:13:26 PM      Sodium 08/03/2024 141  135 - 145 mmol/L Final    Potassium 08/03/2024 4.5  3.4 - 5.3 mmol/L Final    Chloride 08/03/2024 109 (H)  98 - 107 mmol/L Final    Carbon Dioxide (CO2) 08/03/2024 26  22 - 29 mmol/L Final    Anion Gap 08/03/2024 6 (L)  7 - 15 mmol/L Final    Urea Nitrogen 08/03/2024 8.5  6.0 - 20.0 mg/dL Final    Creatinine 08/03/2024 0.93  0.51 - 0.95 mg/dL Final    GFR Estimate 08/03/2024 78  >60 mL/min/1.73m2 Final    Calcium 08/03/2024 9.3  8.8 - 10.4 mg/dL Final    Glucose 08/03/2024 97  70 - 99 mg/dL Final    Protein Total 08/03/2024 6.1 (L)  6.4 - 8.3 g/dL Final    Albumin 08/03/2024 3.9  3.5 - 5.2 g/dL Final    Bilirubin Total 08/03/2024 0.5  <=1.2 mg/dL Final    Alkaline Phosphatase 08/03/2024 55  40 - 150 U/L Final    AST 08/03/2024 16  0 - 45 U/L Final    ALT 08/03/2024 16  0 - 50 U/L Final    Bilirubin Direct 08/03/2024 <0.20  0.00 - 0.30 mg/dL Final    WBC Count 08/03/2024 8.3  4.0 - 11.0 10e3/uL Final    RBC Count 08/03/2024 3.99  3.80 - 5.20 10e6/uL Final    Hemoglobin 08/03/2024 12.0  11.7 - 15.7 g/dL Final     Hematocrit 08/03/2024 35.6  35.0 - 47.0 % Final    MCV 08/03/2024 89  78 - 100 fL Final    MCH 08/03/2024 30.1  26.5 - 33.0 pg Final    MCHC 08/03/2024 33.7  31.5 - 36.5 g/dL Final    RDW 08/03/2024 12.2  10.0 - 15.0 % Final    Platelet Count 08/03/2024 276  150 - 450 10e3/uL Final    Cholesterol 08/03/2024 141  <200 mg/dL Final    Triglycerides 08/03/2024 63  <150 mg/dL Final    Direct Measure HDL 08/03/2024 45 (L)  >=50 mg/dL Final    LDL Cholesterol Calculated 08/03/2024 83  <=100 mg/dL Final    Non HDL Cholesterol 08/03/2024 96  <130 mg/dL Final    Patient Fasting > 8hrs? 08/03/2024 Yes   Final    pH Venous 08/03/2024 7.38  7.32 - 7.43 Final    pCO2 Venous 08/03/2024 47  40 - 50 mm Hg Final    pO2 Venous 08/03/2024 29  25 - 47 mm Hg Final    Bicarbonate Venous 08/03/2024 28  21 - 28 mmol/L Final    Base Excess/Deficit Venous 08/03/2024 2.6  -3.0 - 3.0 mmol/L Final    FIO2 08/03/2024 21   Final    Oxyhemoglobin Venous 08/03/2024 50 (L)  70 - 75 % Final    O2 Sat, Venous 08/03/2024 51.2 (L)  70.0 - 75.0 % Final    Sodium 08/03/2024 140  135 - 145 mmol/L Final    Potassium 08/03/2024 4.5  3.4 - 5.3 mmol/L Final    Chloride 08/03/2024 107  98 - 107 mmol/L Final    Carbon Dioxide (CO2) 08/03/2024 26  22 - 29 mmol/L Final    Anion Gap 08/03/2024 7  7 - 15 mmol/L Final    Urea Nitrogen 08/03/2024 8.0  6.0 - 20.0 mg/dL Final    Creatinine 08/03/2024 0.86  0.51 - 0.95 mg/dL Final    GFR Estimate 08/03/2024 85  >60 mL/min/1.73m2 Final    Calcium 08/03/2024 9.2  8.8 - 10.4 mg/dL Final    Glucose 08/03/2024 96  70 - 99 mg/dL Final    Hold Specimen 08/03/2024 JIC   Final    Vitamin B12 08/03/2024 558  232 - 1,245 pg/mL Final   Lab on 07/23/2024   Component Date Value Ref Range Status    Color Urine 07/23/2024 Yellow  Colorless, Straw, Light Yellow, Yellow Final    Appearance Urine 07/23/2024 Clear  Clear Final    Glucose Urine 07/23/2024 Negative  Negative mg/dL Final    Bilirubin Urine 07/23/2024 Negative  Negative Final     Ketones Urine 07/23/2024 Negative  Negative mg/dL Final    Specific Gravity Urine 07/23/2024 1.010  1.005 - 1.030 Final    Blood Urine 07/23/2024 Moderate (A)  Negative Final    pH Urine 07/23/2024 6.0  5.0 - 8.0 Final    Protein Albumin Urine 07/23/2024 Negative  Negative mg/dL Final    Urobilinogen Urine 07/23/2024 0.2  0.2, 1.0 E.U./dL Final    Nitrite Urine 07/23/2024 Negative  Negative Final    Leukocyte Esterase Urine 07/23/2024 Trace (A)  Negative Final    Trichomonas 07/23/2024 Absent  Absent Final    Yeast 07/23/2024 Absent  Absent Final    Clue Cells 07/23/2024 Absent  Absent Final    WBCs/high power field 07/23/2024 2+ (A)  None Final    Bacteria Urine 07/23/2024 Few (A)  None Seen /HPF Final    RBC Urine 07/23/2024 2-5 (A)  0-2 /HPF /HPF Final    WBC Urine 07/23/2024 0-5  0-5 /HPF /HPF Final    Squamous Epithelials Urine 07/23/2024 Few (A)  None Seen /LPF Final        Total time spent for face to face visit, reviewing labs/imaging studies, counseling and coordination of care was: 1 Hour spent on the date of the encounter doing chart review, review of outside records, review of test results, interpretation of tests, patient visit, and documentation     The longitudinal plan of care for the diagnosis(es)/condition(s) as documented were addressed during this visit. Due to the added complexity in care, I will continue to support Yeimy in the subsequent management and with ongoing continuity of care.    This note was dictated using voice recognition software.  Any grammatical or context distortions are unintentional and inherent to the software.    No orders of the defined types were placed in this encounter.     New Prescriptions    GALCANEZUMAB-GNLM (EMGALITY) 120 MG/ML INJECTION    Inject 1 mL (120 mg) subcutaneously every 28 days    GALCANEZUMAB-GNLM (EMGALITY) 120 MG/ML INJECTION    Inject 2 mLs (240 mg) subcutaneously once for 1 dose    PREDNISONE (DELTASONE) 10 MG TABLET    Take 5 tablets (50 mg)  by mouth daily for 2 days, THEN 4 tablets (40 mg) daily for 2 days, THEN 3 tablets (30 mg) daily for 2 days, THEN 2 tablets (20 mg) daily for 2 days, THEN 1 tablet (10 mg) daily for 2 days. 5 p.o. daily x2 days, then 4 p.o. daily x2 days, then 3 p.o. daily x2 days, then 2 p.o. daily x2 days, then 1 p.o. daily x2 days and then stop    UBROGEPANT (UBRELVY) 50 MG TABLET    Take 1 tablet (50 mg) by mouth at onset of headache (Migraine headache, may repeat x1 in 12 hours.  Maximum 2 tablets in 24 hours)      Modified Medications    No medications on file

## 2024-08-08 NOTE — PATIENT INSTRUCTIONS
INSTRUCTIONS FOR USE       EMGALITY  (em-GAL-it-?)   (galcanezumab-gnlm)   injection, for subcutaneous use   Prefilled Pen           This Instructions for Use is for patients with migraine.   For subcutaneous injection only.      Before you use the EMGALITY prefilled pen (Pen), read and carefully follow all the step-by-step instructions.      Important Information      Your healthcare provider or nurse should show you how to prepare and inject EMGALITY using the Pen. Do not inject yourself or someone else until you have been shown how to inject EMGALITY.   Keep this Instructions for Use and refer to it as needed.   Each EMGALITY Pen is for one-time use only. Do not share or reuse your EMGALITY Pen. You may give or get an infection.   The Pen contains glass parts. Handle it carefully. If you drop it on a hard surface, do not use it. Use a new Pen for your injection.   Your healthcare provider may help you decide where on your body to inject your dose. You can also read the  Choose your injection site  section of these instructions to help you choose which area can work best for you.   If you have vision or hearing problems, do not use EMGALITY Pen without help from a caregiver.   See  Storage and Handling Information  for important storage information.     INSTRUCTIONS FOR USE   Before you use the EMGALITY Pen, read and carefully follow all the step-by-step instructions.   Parts of the EMGALITY Pen             Before You Get Started   Take the Pen from the refrigerator Check your prescription.  EMGALITY comes as a single-dose prefilled Pen.  You will need 2 Pens for your first dose (1-time loading dose). You will need 1 Pen for your monthly dose.    Put the original package with any unused Pens back in the refrigerator.    Leave the base cap on until you are ready to inject.    Leave the Pen at room temperature for 30 minutes before injecting.    Do not microwave the Pen, run hot water over it, or leave it in direct  sunlight.    Do not shake.   Gather Supplies For each injection you will need:  1 alcohol wipe  1 cotton ball or piece of gauze  1 sharps disposal container. See  After You Inject Your Medicine.    Inspect the Pen and the medicine Make sure you have the right medicine. The medicine inside should be clear. Its color may be colorless to slightly yellow to slightly brown.    Do not use the Pen, and throw away (dispose of) as directed by your healthcare provider or pharmacist if:  it looks damaged  the medicine is cloudy, is discolored, or has small particles  the Expiration Date (Exp.) printed on the label has passed  the medicine is frozen                 Prepare for injection Wash your hands with soap and water before you inject your EMGALITY. Make sure a sharps disposal container is close by.   Choose your injection site Your healthcare provider can help you choose the injection site that is best for you.      You may inject the medicine into your stomach area (abdomen). Do not inject within 2 inches of the belly button (navel).    You may inject the medicine into the front of your thighs. This area should be at least 2 inches above the knee and 2 inches below the groin.    Another person may give you the injection in the back of your upper arm, or buttocks.    Do not inject in the exact same spot. For example, if you are giving 2 injections for your first dose (1-time loading dose) and want to use the same body site for the two separate injections, choose a different injection spot. If your first injection was in your abdomen, your next injection could be in another area of your abdomen.    Do not inject into areas where the skin is tender, bruised, red, or hard.    Clean your injection site with an alcohol wipe. Let the injection site dry before you inject.     1 Uncap the Pen            Make sure the Pen is locked. Leave the base cap on until you are ready to inject.     Twist off the base cap and throw it away  in your household trash.     Do not put the base cap back on - this could damage the needle.     Do not touch the needle.         2 Place and Unlock       Place and hold the clear base flat and firmly against your skin.          Turn the lock ring to the unlock position.         3 Press and Hold for 10 Seconds       Press and hold the teal injection button; you will hear a loud click.     Keep holding the clear base firmly against your skin. You will hear a second click in about 10 seconds after the first one. This second click tells you that your injection is complete.     Remove the Pen from your skin.    If you have bleeding at the injection site, press a cotton ball or gauze over the injection site. Do not rub the injection site.         After You Inject Your Medicine      Throw away the used Pen   Put the used EMGALITY Pen in an FDA-cleared sharps disposal container right away after use. Do not throw away (dispose of) the EMGALITY Pen in your household trash.      If you do not have an FDA-cleared sharps disposal container, you may use a household container that is:            - made of a heavy-duty plastic,            - can be closed with a tight-fitting, puncture-resistant lid, without sharps being able to come out,            - upright and stable during use,            - leak-resistant, and            - properly labeled to warn of hazardous waste inside the container.      When your sharps disposal container is almost full, you will need to follow your community guidelines for the right way to dispose of your sharps disposal container. There may be state or local laws about how you should throw away needles and syringes. For more information about safe sharps disposal, and for specific information about sharps disposal in the state you live in, go to the FDA's website at: http://www.fda.gov/safesharpsdisposal.    Do not recycle your used sharps disposal container.          Commonly Asked Questions       Q.  What if I see bubbles in the Pen?   A. It is normal to have air bubbles in the Pen. EMGALITY is injected under your skin (subcutaneous injection), so these air bubbles will not harm you.       Q. What if there is a drop of liquid on the tip of the needle when I remove the base cap?   A. It is okay to see a drop of liquid on the tip of the needle.       Q. What if I unlocked the Pen and pressed the teal injection button before I twisted off the base cap?   A. Do not remove the base cap. Dispose of the Pen and get a new one.       Q. Do I need to hold the injection button down until the injection is complete?   A. This is not necessary, but it may help you keep the Pen steady and firm against your skin.       Q. What if the needle did not retract after my injection?   A. Do not touch the needle or replace the base cap. Store in a safe place to avoid an accidental needlestick and contact [phone] for instructions on how to return the Pen.       Q. What if there is a drop of liquid or blood on my skin after my injection?   A. This is normal. Press a cotton ball or gauze over the injection site. Do not rub the injection site.       Q. What if I heard more than 2 clicks during my injection - 2 loud clicks and a soft one. Did I get my complete injection?   A. Some patients may hear a soft click right before the second loud click. That is the normal operation of the Pen. Do not remove the Pen from your skin until you hear the second loud click.       Q. How can I tell if my injection is complete?   A. After you press the teal injection button, you will hear 2 loud clicks. The second click tells you that your injection is complete. You will also see the gray plunger at the top of the clear base.        If you have more questions about how to use the EMGALITY Pen:    Call your healthcare provider         Call [phone] ([phone])     Visit [email or web address]      Storage and Handling Information    Store your  Pen in the refrigerator between 36 F to 46 F (2 C to 8 C).   Your Pen may be stored out of the refrigerator in the original carton at temperatures up to 86 F (30 C) for up to 7 days. After storing out of the refrigerator, do not place EMGALITY back in the refrigerator.   Do not freeze your Pen.   Keep your Pen in the carton it comes in to protect it from light until time of use.   Do not shake your Pen.   Throw away your Pen if any of the above conditions are not followed.   Keep your Pen and all medicines out of the reach of children.      Read the full Prescribing Information and Patient Information for EMGALITY inside this box to learn more about your medicine.       This Instructions for Use has been approved by the U.S. Food and Drug Administration.   [employer] [place] [address], [facility]   [place] License Number [id number]     EMGALITY  is a registered trademark of [employer].     Copyright   2018, 2019, [employer]. All rights reserved.     Revised: 06/2019     The Pen meets the current dose accuracy and functional requirements of ISO 60836-5 and 28084-3.     [id number]

## 2024-08-09 RX ORDER — RIZATRIPTAN BENZOATE 10 MG/1
10 TABLET, ORALLY DISINTEGRATING ORAL
Qty: 12 TABLET | Refills: 4 | Status: SHIPPED | OUTPATIENT
Start: 2024-08-09

## 2024-08-09 RX ORDER — VENLAFAXINE HYDROCHLORIDE 37.5 MG/1
37.5 CAPSULE, EXTENDED RELEASE ORAL DAILY
Qty: 30 CAPSULE | Refills: 6 | Status: SHIPPED | OUTPATIENT
Start: 2024-08-09

## 2024-08-09 NOTE — TELEPHONE ENCOUNTER
Discontinue Emgality and Ubrelvy.  Instead start:    1.  Effexor 37.5 mg daily  2.  Use Maxalt MLT 10 mg as needed headache.      Prescription sent to patient's pharmacy

## 2024-08-09 NOTE — TELEPHONE ENCOUNTER
Pt prescribed ubrelvy for rescue for migraines. Medication is not covered by insurance and is unaffordable.     Do you have alternative recommendation? Has tried Imitrex in past per your notes.     Deon Tam RN, BSN  [facility]

## 2024-08-11 ENCOUNTER — HOSPITAL ENCOUNTER (EMERGENCY)
Facility: CLINIC | Age: 44
Discharge: HOME | End: 2024-08-12
Attending: EMERGENCY MEDICINE
Payer: COMMERCIAL

## 2024-08-11 DIAGNOSIS — K21.00 GASTROESOPHAGEAL REFLUX DISEASE WITH ESOPHAGITIS WITHOUT HEMORRHAGE: ICD-10-CM

## 2024-08-11 DIAGNOSIS — K29.00 ACUTE GASTRITIS WITHOUT HEMORRHAGE, UNSPECIFIED GASTRITIS TYPE: ICD-10-CM

## 2024-08-11 LAB
ALBUMIN SERPL BCG-MCNC: 4.3 G/DL (ref 3.5–5.2)
ALP SERPL-CCNC: 61 U/L (ref 40–150)
ALT SERPL W P-5'-P-CCNC: 62 U/L (ref 0–50)
ANION GAP SERPL CALCULATED.3IONS-SCNC: 7 MMOL/L (ref 7–15)
AST SERPL W P-5'-P-CCNC: 22 U/L (ref 0–45)
ATRIAL RATE - MUSE: 74 BPM
BASOPHILS # BLD AUTO: 0 10E3/UL (ref 0–0.2)
BASOPHILS NFR BLD AUTO: 0 %
BILIRUB SERPL-MCNC: 0.4 MG/DL
BUN SERPL-MCNC: 13.1 MG/DL (ref 6–20)
CALCIUM SERPL-MCNC: 9.3 MG/DL (ref 8.8–10.4)
CHLORIDE SERPL-SCNC: 105 MMOL/L (ref 98–107)
CREAT SERPL-MCNC: 1 MG/DL (ref 0.51–0.95)
DIASTOLIC BLOOD PRESSURE - MUSE: 79 MMHG
EGFRCR SERPLBLD CKD-EPI 2021: 71 ML/MIN/1.73M2
EOSINOPHIL # BLD AUTO: 0 10E3/UL (ref 0–0.7)
EOSINOPHIL NFR BLD AUTO: 0 %
ERYTHROCYTE [DISTWIDTH] IN BLOOD BY AUTOMATED COUNT: 12.5 % (ref 10–15)
GLUCOSE SERPL-MCNC: 104 MG/DL (ref 70–99)
HCO3 SERPL-SCNC: 27 MMOL/L (ref 22–29)
HCT VFR BLD AUTO: 38.5 % (ref 35–47)
HGB BLD-MCNC: 13.1 G/DL (ref 11.7–15.7)
IMM GRANULOCYTES # BLD: 0.2 10E3/UL
IMM GRANULOCYTES NFR BLD: 2 %
INTERPRETATION ECG - MUSE: NORMAL
LIPASE SERPL-CCNC: 36 U/L (ref 13–60)
LYMPHOCYTES # BLD AUTO: 2.3 10E3/UL (ref 0.8–5.3)
LYMPHOCYTES NFR BLD AUTO: 26 %
MCH RBC QN AUTO: 30.2 PG (ref 26.5–33)
MCHC RBC AUTO-ENTMCNC: 34 G/DL (ref 31.5–36.5)
MCV RBC AUTO: 89 FL (ref 78–100)
MONOCYTES # BLD AUTO: 0.8 10E3/UL (ref 0–1.3)
MONOCYTES NFR BLD AUTO: 9 %
NEUTROPHILS # BLD AUTO: 5.5 10E3/UL (ref 1.6–8.3)
NEUTROPHILS NFR BLD AUTO: 62 %
NRBC # BLD AUTO: 0 10E3/UL
NRBC BLD AUTO-RTO: 0 /100
P AXIS - MUSE: 73 DEGREES
PLATELET # BLD AUTO: 284 10E3/UL (ref 150–450)
POTASSIUM SERPL-SCNC: 3.5 MMOL/L (ref 3.4–5.3)
PR INTERVAL - MUSE: 116 MS
PROT SERPL-MCNC: 6.9 G/DL (ref 6.4–8.3)
QRS DURATION - MUSE: 106 MS
QT - MUSE: 398 MS
QTC - MUSE: 441 MS
R AXIS - MUSE: 91 DEGREES
RBC # BLD AUTO: 4.34 10E6/UL (ref 3.8–5.2)
SODIUM SERPL-SCNC: 139 MMOL/L (ref 135–145)
SYSTOLIC BLOOD PRESSURE - MUSE: 118 MMHG
T AXIS - MUSE: 52 DEGREES
VENTRICULAR RATE- MUSE: 74 BPM
WBC # BLD AUTO: 8.9 10E3/UL (ref 4–11)

## 2024-08-11 PROCEDURE — 85025 COMPLETE CBC W/AUTO DIFF WBC: CPT | Performed by: EMERGENCY MEDICINE

## 2024-08-11 PROCEDURE — 99285 EMERGENCY DEPT VISIT HI MDM: CPT | Mod: 25

## 2024-08-11 PROCEDURE — 250N000013 HC RX MED GY IP 250 OP 250 PS 637: Performed by: EMERGENCY MEDICINE

## 2024-08-11 PROCEDURE — 96375 TX/PRO/DX INJ NEW DRUG ADDON: CPT

## 2024-08-11 PROCEDURE — 83690 ASSAY OF LIPASE: CPT | Performed by: EMERGENCY MEDICINE

## 2024-08-11 PROCEDURE — 36415 COLL VENOUS BLD VENIPUNCTURE: CPT | Performed by: EMERGENCY MEDICINE

## 2024-08-11 PROCEDURE — 80053 COMPREHEN METABOLIC PANEL: CPT | Performed by: EMERGENCY MEDICINE

## 2024-08-11 PROCEDURE — 250N000011 HC RX IP 250 OP 636: Performed by: EMERGENCY MEDICINE

## 2024-08-11 PROCEDURE — 93005 ELECTROCARDIOGRAM TRACING: CPT | Performed by: EMERGENCY MEDICINE

## 2024-08-11 PROCEDURE — 250N000009 HC RX 250: Performed by: EMERGENCY MEDICINE

## 2024-08-11 PROCEDURE — 81001 URINALYSIS AUTO W/SCOPE: CPT | Performed by: EMERGENCY MEDICINE

## 2024-08-11 PROCEDURE — 96374 THER/PROPH/DIAG INJ IV PUSH: CPT | Mod: 59

## 2024-08-11 RX ORDER — MAGNESIUM HYDROXIDE/ALUMINUM HYDROXICE/SIMETHICONE 120; 1200; 1200 MG/30ML; MG/30ML; MG/30ML
15 SUSPENSION ORAL ONCE
Status: COMPLETED | OUTPATIENT
Start: 2024-08-11 | End: 2024-08-11

## 2024-08-11 RX ORDER — LIDOCAINE HYDROCHLORIDE 20 MG/ML
5 SOLUTION OROPHARYNGEAL ONCE
Status: COMPLETED | OUTPATIENT
Start: 2024-08-11 | End: 2024-08-11

## 2024-08-11 RX ADMIN — ALUMINUM HYDROXIDE, MAGNESIUM HYDROXIDE, AND SIMETHICONE 15 ML: 200; 200; 20 SUSPENSION ORAL at 23:46

## 2024-08-11 RX ADMIN — LIDOCAINE HYDROCHLORIDE 5 ML: 20 SOLUTION ORAL at 23:45

## 2024-08-11 RX ADMIN — PANTOPRAZOLE SODIUM 40 MG: 40 INJECTION, POWDER, FOR SOLUTION INTRAVENOUS at 23:14

## 2024-08-11 RX ADMIN — FAMOTIDINE 20 MG: 10 INJECTION, SOLUTION INTRAVENOUS at 23:12

## 2024-08-11 ASSESSMENT — ENCOUNTER SYMPTOMS
FEVER: 0
HEMATURIA: 0
FREQUENCY: 0
ABDOMINAL PAIN: 1
DYSURIA: 0
CHILLS: 1

## 2024-08-11 ASSESSMENT — COLUMBIA-SUICIDE SEVERITY RATING SCALE - C-SSRS
1. IN THE PAST MONTH, HAVE YOU WISHED YOU WERE DEAD OR WISHED YOU COULD GO TO SLEEP AND NOT WAKE UP?: NO
6. HAVE YOU EVER DONE ANYTHING, STARTED TO DO ANYTHING, OR PREPARED TO DO ANYTHING TO END YOUR LIFE?: NO
2. HAVE YOU ACTUALLY HAD ANY THOUGHTS OF KILLING YOURSELF IN THE PAST MONTH?: NO

## 2024-08-11 ASSESSMENT — ACTIVITIES OF DAILY LIVING (ADL): ADLS_ACUITY_SCORE: 35

## 2024-08-12 ENCOUNTER — APPOINTMENT (OUTPATIENT)
Dept: CT IMAGING | Facility: CLINIC | Age: 44
End: 2024-08-12
Attending: EMERGENCY MEDICINE
Payer: COMMERCIAL

## 2024-08-12 VITALS
HEART RATE: 63 BPM | RESPIRATION RATE: 20 BRPM | OXYGEN SATURATION: 98 % | DIASTOLIC BLOOD PRESSURE: 69 MMHG | BODY MASS INDEX: 21.47 KG/M2 | TEMPERATURE: 98.1 F | SYSTOLIC BLOOD PRESSURE: 123 MMHG | HEIGHT: 70 IN | WEIGHT: 150 LBS

## 2024-08-12 LAB
ALBUMIN UR-MCNC: NEGATIVE MG/DL
APPEARANCE UR: CLEAR
BACTERIA #/AREA URNS HPF: ABNORMAL /HPF
BILIRUB UR QL STRIP: NEGATIVE
COLOR UR AUTO: ABNORMAL
GLUCOSE UR STRIP-MCNC: NEGATIVE MG/DL
HGB UR QL STRIP: NEGATIVE
KETONES UR STRIP-MCNC: NEGATIVE MG/DL
LEUKOCYTE ESTERASE UR QL STRIP: NEGATIVE
MUCOUS THREADS #/AREA URNS LPF: PRESENT /LPF
NITRATE UR QL: NEGATIVE
PH UR STRIP: 6.5 [PH] (ref 5–7)
RBC URINE: 1 /HPF
SP GR UR STRIP: 1.01 (ref 1–1.03)
SQUAMOUS EPITHELIAL: 4 /HPF
UROBILINOGEN UR STRIP-MCNC: <2 MG/DL
WBC URINE: 2 /HPF

## 2024-08-12 PROCEDURE — 74177 CT ABD & PELVIS W/CONTRAST: CPT

## 2024-08-12 PROCEDURE — 250N000011 HC RX IP 250 OP 636: Performed by: EMERGENCY MEDICINE

## 2024-08-12 RX ORDER — SUCRALFATE ORAL 1 G/10ML
1 SUSPENSION ORAL 4 TIMES DAILY
Qty: 560 ML | Refills: 0 | Status: SHIPPED | OUTPATIENT
Start: 2024-08-12 | End: 2024-08-26

## 2024-08-12 RX ORDER — IOPAMIDOL 755 MG/ML
90 INJECTION, SOLUTION INTRAVASCULAR ONCE
Status: COMPLETED | OUTPATIENT
Start: 2024-08-12 | End: 2024-08-12

## 2024-08-12 RX ORDER — OMEPRAZOLE 40 MG/1
40 CAPSULE, DELAYED RELEASE ORAL DAILY
Qty: 14 CAPSULE | Refills: 0 | Status: SHIPPED | OUTPATIENT
Start: 2024-08-12 | End: 2024-08-26

## 2024-08-12 RX ORDER — FAMOTIDINE 20 MG/1
20 TABLET, FILM COATED ORAL 2 TIMES DAILY
Qty: 28 TABLET | Refills: 0 | Status: SHIPPED | OUTPATIENT
Start: 2024-08-12 | End: 2024-08-26

## 2024-08-12 RX ADMIN — IOPAMIDOL 90 ML: 755 INJECTION, SOLUTION INTRAVENOUS at 00:11

## 2024-08-12 ASSESSMENT — ACTIVITIES OF DAILY LIVING (ADL): ADLS_ACUITY_SCORE: 35

## 2024-08-12 NOTE — DISCHARGE INSTRUCTIONS
Take medications as prescribed.  Follow-up with primary care.  Return for any new or worsening symptoms.  Continue with the stool softeners on a daily basis until you are having normal bowel movements.  You can also try some over-the-counter MiraLAX which may help as well with the constipation.

## 2024-08-12 NOTE — ED TRIAGE NOTES
Pt recently admitted for CP and HA; now presenting with CP and abdominal pain. Generalized upper abdomen and centralized chest pain; EKG obtained in triage.    Chest pain has been about 24h, abdominal pain has been worsening over the last 2h. VSS in triage.     Triage Assessment (Adult)       Row Name 08/11/24 [id number]          Triage Assessment    Airway WDL WDL        Respiratory WDL    Respiratory WDL WDL        Skin Circulation/Temperature WDL    Skin Circulation/Temperature WDL WDL        Cardiac WDL    Cardiac WDL X;chest pain        Chest Pain Assessment    Chest Pain Location epigastric     Duration 24h     Chest Pain Intervention 12-lead ECG obtained        Peripheral/Neurovascular WDL    Peripheral Neurovascular WDL WDL        Cognitive/Neuro/Behavioral WDL    Cognitive/Neuro/Behavioral WDL WDL

## 2024-08-12 NOTE — ED PROVIDER NOTES
EMERGENCY DEPARTMENT ENCOUNTER      NAME: Yeimy Blackmon  AGE: 44 year old female  YOB: 1980  MRN: [id number]  EVALUATION DATE & TIME: 8/11/2024 10:04 PM    PCP: Rosemarie Livingston    ED PROVIDER: Roula Tesfaye M.D.      Chief Complaint   Patient presents with    Abdominal Pain    Chest Pain         FINAL IMPRESSION:  1. Acute gastritis without hemorrhage, unspecified gastritis type    2. Gastroesophageal reflux disease with esophagitis without hemorrhage        MEDICAL DECISION MAKING:    Pertinent Labs & Imaging studies reviewed. (See chart for details)  ED Course as of 08/12/24 0057   Sun Aug 11, 2024   2321 Afebrile.  Vital signs here unremarkable.  Patient is coming in for evaluation of abdominal pain, chest discomfort.  She describes the chest pain more as acid reflux type symptoms.  Reflux all day.  She says she has been having a hard time sleeping secondary to the discomfort.  She is also developed some lower abdominal pain today.  Reports last bowel movement was few days ago.  No nausea.  Recently admitted for status migrainosus and at that time was having this chest pain as well.  Was sent home on antacids and has been taking them.  Pain started a few days ago.    Patient's EKG here shows sinus rhythm with left atrial enlargement.  There is no ST elevations or depressions.  No ectopy.  Right axis deviation.  Incomplete right bundle branch block.  As compared with previous EKG from August 2, 2024 incomplete right bundle branch block is now new.  Otherwise no acute findings.   2322 Physical exam for patient here appears mildly uncomfortable.  She has epigastric abdominal tenderness to palpation.  She has lower abdominal fullness noted but no distention.  No lower abdominal discomfort.    Will check labs for patient here, CT scan abdomen pelvis.  Medications for GERD.   Mon Aug 12, 2024   0042 Laboratory evaluation here with creatinine 1.00, baseline 0.8.  She did get 1 L of fluid.  Remainder  of labs unremarkable.  Urinalysis here without evidence for infection.   0055 CT scan here without acute issues noted.  Does have some free fluid which could be from an ovarian cyst which ruptured but nothing acute at this time.  Patient is feeling slightly better.  Plan to discharge home with medications for gastritis versus GERD.   0056 Plan for discharge to home with medications, follow-up with primary care.  Return for any new or worsening symptoms.         Medical Decision Making  Obtained supplemental history:Supplemental history obtained?: Documented in chart  Reviewed external records: External records reviewed?: Documented in chart  Care impacted by chronic illness:N/A  Care significantly affected by social determinants of health:Access to Medical Care  Did you consider but not order tests?: Work up considered but not performed and documented in chart, if applicable  Did you interpret images independently?: Independent interpretation of ECG and images noted in documentation, when applicable.  Consultation discussion with other provider:Did you involve another provider (consultant, , pharmacy, etc.)?: No  Discharge. I prescribed additional prescription strength medication(s) as charted. See documentation for any additional details.      Critical care: 0 minutes excluding separately billable procedures.  Includes bedside management, time reviewing test results, review of records, discussing the case with staff, documenting the medical record and time spent with family members (or surrogate decision makers) discussing specific treatment issues.          ED COURSE:  10:19 PM I met with the patient, obtained history, performed an initial exam, and discussed options and plan for diagnostics and treatment here in the ED.  12:49 AM I rechecked and updated patient on results. We discussed the plan for discharge and the patient is agreeable. Reviewed supportive cares, symptomatic treatment, outpatient follow up,  and reasons to return to the Emergency Department. Patient to be discharged by ED RN.     The importance of close follow up was discussed. We reviewed warning signs and symptoms, and I instructed Ms. Blackmon to return to the emergency department immediately if she develops any new or worsening symptoms. I provided additional verbal discharge instructions. Ms. Blackmon expressed understanding and agreement with this plan of care, her questions were answered, and she was discharged in stable condition.     MEDICATIONS GIVEN IN THE EMERGENCY:  Medications   famotidine (PEPCID) injection 20 mg (20 mg Intravenous $Given 8/11/24 2312)   pantoprazole (PROTONIX) IV push injection 40 mg (40 mg Intravenous $Given 8/11/24 2314)   alum & mag hydroxide-simethicone (MAALOX) suspension 15 mL (15 mLs Oral $Given 8/11/24 2346)   lidocaine (viscous) (XYLOCAINE) 2 % solution 5 mL (5 mLs Mouth/Throat $Given 8/11/24 2345)   iopamidol (ISOVUE-370) solution 90 mL (90 mLs Intravenous $Given 8/12/24 0011)       NEW PRESCRIPTIONS STARTED AT TODAY'S ER VISIT:  New Prescriptions    FAMOTIDINE (PEPCID) 20 MG TABLET    Take 1 tablet (20 mg) by mouth 2 times daily for 14 days    OMEPRAZOLE (PRILOSEC) 40 MG DR CAPSULE    Take 1 capsule (40 mg) by mouth daily for 14 days    SUCRALFATE (CARAFATE) 1 GM/10ML SUSPENSION    Take 10 mLs (1 g) by mouth 4 times daily for 14 days          =================================================================    HPI    Patient information was obtained from: Patient    Use of : N/A         Yeimygael Blackmon is a 44 year old female with no pertinent history who presents to the ED via walk-in for the evaluation of abdominal pain and chest pain.    Patient reports she was seen over a week ago for chest pain and headache and was admitted at that time. She states that after she was discharged, chest pain resolved. She had been taking antacids since then. Last night, she states that chest pain came back, located  in the middle of her chest. She describes pain as a heartburn sensation. Patient states she took some antacids which did improve her symptoms. However, a couple hours ago, she started having pain in her epigastric region of her abdomen. She mentions she has had intermittent chills and constipation. Last bowel movement was a couple days ago. She did take a stool softener 1.5 hours ago. Otherwise, she denies any urinary symptoms and fevers. No other complaints at this time.    Per chart review, patient was seen at [facility] on 8/2/24-8/4/24 for chest tightness, ST depression in inferior leads, and RBBB, all resolved. Chest pain likely due to taking too much ibuprofen. She was started on pantoprazole 40 mg po. Patient also has been having migraines over the last week. She took about  1 sumatriptan each day and 3 to 4 pills of 200 mg ibuprofen 1-2 times a day in the past week.  She used to take amitriptyline for prevention but has been off it since few years ago.  She has tingling on her face, bilateral arms when and before the headache, likely aura. MRI brain negative. Recommended to start propranolol 5 mg twice daily and magnesium oxide 400 mg daily. Creatinine is 0.96 up from her baseline 0.8-0.9. eGFR is 75. This is likely affected by ibuprofen doses. Given fluids. Discharged home.    REVIEW OF SYSTEMS   Review of Systems   Constitutional:  Positive for chills. Negative for fever.   Cardiovascular:  Positive for chest pain.   Gastrointestinal:  Positive for abdominal pain.   Genitourinary:  Negative for dysuria, frequency, hematuria and urgency.   All other systems reviewed and are negative.    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     low egg count, took 5 years to get pregnant with her first    Kidney stone 05/24/2022    dx with uti and 7 mm stone, stent placed at [facility]    Migraine     Normal delivery 11/01/2016    Normal delivery 02/03/2019       PAST SURGICAL HISTORY:  Past Surgical History:   Procedure Laterality Date    cystoscopy with right ureteral stent placement  05/25/2022    R 7 mm stone with uti at the time, hospitalized       CURRENT MEDICATIONS:    No current facility-administered medications for this encounter.    Current Outpatient Medications:     famotidine (PEPCID) 20 MG tablet, Take 1 tablet (20 mg) by mouth 2 times daily for 14 days, Disp: 28 tablet, Rfl: 0    omeprazole (PRILOSEC) 40 MG DR capsule, Take 1 capsule (40 mg) by mouth daily for 14 days, Disp: 14 capsule, Rfl: 0    sucralfate (CARAFATE) 1 GM/10ML suspension, Take 10 mLs (1 g) by mouth 4 times daily for 14 days, Disp: 560 mL, Rfl: 0    acetaminophen-caffeine (EXCEDRIN TENSION HEADACHE) 500-65 MG TABS, Take 1-2 tablets by mouth every 6 hours as needed for mild pain, Disp: , Rfl:     levonorgestrel-ethinyl estradiol (SEASONALE) 0.15-0.03 MG tablet, Take 1 tablet by mouth daily, Disp: 84 tablet, Rfl: 4    magnesium oxide (MAG-OX) 400 MG tablet, Take 1 tablet (400 mg) by mouth daily, Disp: 30 tablet, Rfl: 0    multivitamin, therapeutic (THERA-VIT) TABS tablet, Take 1 tablet by mouth daily, Disp: , Rfl:     pantoprazole (PROTONIX) 40 MG EC tablet, Take 1 tablet (40 mg) by mouth every morning (before breakfast), Disp: 7 tablet, Rfl: 0    predniSONE (DELTASONE) 10 MG tablet, Take 5 tablets (50 mg) by mouth daily for 2 days, THEN 4 tablets (40 mg) daily for 2 days, THEN 3 tablets (30 mg) daily for 2 days, THEN 2 tablets (20 mg) daily for 2 days, THEN 1 tablet (10 mg) daily for 2 days. 5 p.o. daily x2 days, then 4 p.o. daily x2 days, then 3 p.o. daily x2 days, then 2 p.o. daily x2 days, then 1 p.o. daily x2 days and then stop, Disp: 30 tablet, Rfl: 0    rizatriptan (MAXALT-MLT) 10 MG ODT, Take 1 tablet (10 mg) by mouth at onset of headache for migraine May repeat in 2 hours. Max 3 tablets/24  "hours., Disp: 12 tablet, Rfl: 4    venlafaxine (EFFEXOR XR) 37.5 MG 24 hr capsule, Take 1 capsule (37.5 mg) by mouth daily, Disp: 30 capsule, Rfl: 6    ALLERGIES:  No Known Allergies    FAMILY HISTORY:  Family History   Problem Relation Age of Onset    Rheumatoid Arthritis Mother     No Known Problems Father     Cancer Maternal Uncle         bladder    Rheumatoid Arthritis Maternal Uncle     Heart Disease Maternal Grandmother     Brain Cancer Maternal Grandmother     Early Death Maternal Grandfather         unknown cause    Rheumatoid Arthritis Maternal Uncle        SOCIAL HISTORY:   Social History     Socioeconomic History    Marital status:      Spouse name: Aristeo    Number of children: 2   Occupational History    Occupation:      Comment: [employer]   Tobacco Use    Smoking status: Never     Passive exposure: Never    Smokeless tobacco: Never   Vaping Use    Vaping status: Never Used   Substance and Sexual Activity    Alcohol use: No     Comment: Alcoholic Drinks/day: 1-2 times a year    Drug use: No    Sexual activity: Yes     Partners: Male     Birth control/protection: Surgical     Comment: vasectomy     Social Determinants of Health     Interpersonal Safety: Low Risk  (2/26/2024)    Interpersonal Safety     Do you feel physically and emotionally safe where you currently live?: Yes     Within the past 12 months, have you been hit, slapped, kicked or otherwise physically hurt by someone?: No     Within the past 12 months, have you been humiliated or emotionally abused in other ways by your partner or ex-partner?: No       PHYSICAL EXAM:    Vitals: /79   Pulse 93   Temp 98.1  F (36.7  C)   Resp 20   Ht 1.778 m (5' 10\")   Wt 68 kg (150 lb)   LMP 07/23/2024 (Approximate)   SpO2 99%   BMI 21.52 kg/m     General:. Alert and interactive, mildly uncomfortable appearing.  HENT: Oropharynx without erythema or exudates. MMM.  TMs clear bilaterally.  Eyes: Pupils mid-sized and equally " reactive.   Neck: Full AROM.  No midline tenderness to palpation.  Cardiovascular: Regular rate and rhythm. Peripheral pulses 2+ bilaterally.  Chest/Pulmonary: Normal work of breathing. Lung sounds clear and equal throughout, no wheezes or crackles. No chest wall tenderness or deformities.  Abdomen: Soft. Epigastric abdominal tenderness to palpation. Lower abdominal fullness noted but no distension. No lower abdominal discomfort. No guarding or rebound.  Back/Spine: No CVA or midline tenderness.  Extremities: Normal ROM of all major joints. No lower extremity edema.   Skin: Warm and dry. Normal skin color.   Neuro: Speech clear. CNs grossly intact. Moves all extremities appropriately. Strength and sensation grossly intact to all extremities.   Psych: Normal affect/mood, cooperative, memory appropriate.     LAB:  All pertinent labs reviewed and interpreted.  Labs Ordered and Resulted from Time of ED Arrival to Time of ED Departure   COMPREHENSIVE METABOLIC PANEL - Abnormal       Result Value    Sodium 139      Potassium 3.5      Carbon Dioxide (CO2) 27      Anion Gap 7      Urea Nitrogen 13.1      Creatinine 1.00 (*)     GFR Estimate 71      Calcium 9.3      Chloride 105      Glucose 104 (*)     Alkaline Phosphatase 61      AST 22      ALT 62 (*)     Protein Total 6.9      Albumin 4.3      Bilirubin Total 0.4     ROUTINE UA WITH MICROSCOPIC REFLEX TO CULTURE - Abnormal    Color Urine Light Yellow      Appearance Urine Clear      Glucose Urine Negative      Bilirubin Urine Negative      Ketones Urine Negative      Specific Gravity Urine 1.012      Blood Urine Negative      pH Urine 6.5      Protein Albumin Urine Negative      Urobilinogen Urine <2.0      Nitrite Urine Negative      Leukocyte Esterase Urine Negative      Bacteria Urine Few (*)     Mucus Urine Present (*)     RBC Urine 1      WBC Urine 2      Squamous Epithelials Urine 4 (*)    LIPASE - Normal    Lipase 36     CBC WITH PLATELETS AND DIFFERENTIAL    WBC  Count 8.9      RBC Count 4.34      Hemoglobin 13.1      Hematocrit 38.5      MCV 89      MCH 30.2      MCHC 34.0      RDW 12.5      Platelet Count 284      % Neutrophils 62      % Lymphocytes 26      % Monocytes 9      % Eosinophils 0      % Basophils 0      % Immature Granulocytes 2      NRBCs per 100 WBC 0      Absolute Neutrophils 5.5      Absolute Lymphocytes 2.3      Absolute Monocytes 0.8      Absolute Eosinophils 0.0      Absolute Basophils 0.0      Absolute Immature Granulocytes 0.2      Absolute NRBCs 0.0         RADIOLOGY:  CT Abdomen Pelvis w Contrast   Preliminary Result   IMPRESSION:    1.  Apparent mild diffuse wall thickening of the urinary bladder. This could be physiologic, but cystitis is also possible. Recommend clinical correlation.   2.  A very small amount of free fluid in the pelvis. This is nonspecific, but could relate to a ruptured ovarian cyst.   3.  No other cause of acute pain identified in the abdomen or pelvis. Normal appendix.           EKG:  See MDM  I have independently reviewed and interpreted the EKG(s) documented above.           I, Riri Lares, am serving as a scribe to document services personally performed by Dr. Roula Tesfaye  based on my observation and the provider's statements to me. I, Roula Tesfaye MD attest that Riri Lares is acting in a scribe capacity, has observed my performance of the services and has documented them in accordance with my direction.      Roula Tesfaye M.D.  Emergency Medicine  [facility] EMERGENCY ROOM  [address] [id number] [phone]  Dept: [phone]     Roula Tesfaye MD  08/12/24 0057

## 2024-08-12 NOTE — TELEPHONE ENCOUNTER
Hello,     I believe there was a typo in your previous message, you would like the patient to start venlafaxine 37.5, correct? This is what you sent to the pharmacy. In your note below you typed emgality 37.5, but also indicated that they should discontinue.     Please clarify, so that the documentation is correct!    Thank you  Deon Tam RN, BSN  [facility]

## 2024-08-12 NOTE — TELEPHONE ENCOUNTER
That is a typo.  I meant to discontinue Emgality and Ubrelvy and instead start Effexor 37.5 mg.  I have addended that note

## 2024-08-13 ENCOUNTER — MYC MEDICAL ADVICE (OUTPATIENT)
Dept: FAMILY MEDICINE | Facility: CLINIC | Age: 44
End: 2024-08-13
Payer: COMMERCIAL

## 2024-08-13 NOTE — TELEPHONE ENCOUNTER
See MyChart message from patient.     Spoke with patient. States numbness/tingling feels the same as it has been, not any worse, just seems to come and go. Denies any new symptoms. No throbbing pain.     Advised to go to ED if experiences new or worsening symptoms such as severe headache, nausea, vomiting,  difficulty breathing or chest pain. Patient verbalized understanding and is in agreement with plan.     Routing to provider to review and advise on [employer] message.     Leslie Mills RN  [facility]

## 2024-08-14 DIAGNOSIS — N92.6 IRREGULAR MENSES: ICD-10-CM

## 2024-08-14 RX ORDER — LEVONORGESTREL AND ETHINYL ESTRADIOL 0.15-0.03
1 KIT ORAL DAILY
Qty: 91 TABLET | Refills: 4 | Status: SHIPPED | OUTPATIENT
Start: 2024-08-14

## 2024-08-15 NOTE — TELEPHONE ENCOUNTER
Ebonie Denny RN called [facility] on 8/15 and left a message to return the call to the clinic.  If patient calls back, please route to [facility].    TC please route to RN.    RICHIE Whitney RN  [facility]

## 2024-08-18 ENCOUNTER — APPOINTMENT (OUTPATIENT)
Dept: CT IMAGING | Facility: CLINIC | Age: 44
End: 2024-08-18
Attending: EMERGENCY MEDICINE
Payer: COMMERCIAL

## 2024-08-18 ENCOUNTER — HOSPITAL ENCOUNTER (EMERGENCY)
Facility: CLINIC | Age: 44
Discharge: HOME | End: 2024-08-18
Attending: EMERGENCY MEDICINE | Admitting: EMERGENCY MEDICINE
Payer: COMMERCIAL

## 2024-08-18 VITALS
HEIGHT: 70 IN | OXYGEN SATURATION: 100 % | TEMPERATURE: 98.7 F | SYSTOLIC BLOOD PRESSURE: 118 MMHG | RESPIRATION RATE: 20 BRPM | DIASTOLIC BLOOD PRESSURE: 65 MMHG | BODY MASS INDEX: 21.47 KG/M2 | WEIGHT: 150 LBS | HEART RATE: 63 BPM

## 2024-08-18 DIAGNOSIS — G43.809 OTHER MIGRAINE WITHOUT STATUS MIGRAINOSUS, NOT INTRACTABLE: ICD-10-CM

## 2024-08-18 DIAGNOSIS — R00.0 TACHYCARDIA: ICD-10-CM

## 2024-08-18 DIAGNOSIS — R07.9 CHEST PAIN, UNSPECIFIED TYPE: ICD-10-CM

## 2024-08-18 LAB
ANION GAP SERPL CALCULATED.3IONS-SCNC: 11 MMOL/L (ref 7–15)
ATRIAL RATE - MUSE: 99 BPM
BUN SERPL-MCNC: 8.2 MG/DL (ref 6–20)
CALCIUM SERPL-MCNC: 9.6 MG/DL (ref 8.8–10.4)
CHLORIDE SERPL-SCNC: 106 MMOL/L (ref 98–107)
CREAT SERPL-MCNC: 1.04 MG/DL (ref 0.51–0.95)
DIASTOLIC BLOOD PRESSURE - MUSE: 95 MMHG
EGFRCR SERPLBLD CKD-EPI 2021: 68 ML/MIN/1.73M2
ERYTHROCYTE [DISTWIDTH] IN BLOOD BY AUTOMATED COUNT: 12.4 % (ref 10–15)
GLUCOSE SERPL-MCNC: 102 MG/DL (ref 70–99)
HCG SERPL QL: NEGATIVE
HCO3 SERPL-SCNC: 24 MMOL/L (ref 22–29)
HCT VFR BLD AUTO: 42.2 % (ref 35–47)
HGB BLD-MCNC: 14.3 G/DL (ref 11.7–15.7)
INTERPRETATION ECG - MUSE: NORMAL
MCH RBC QN AUTO: 29.9 PG (ref 26.5–33)
MCHC RBC AUTO-ENTMCNC: 33.9 G/DL (ref 31.5–36.5)
MCV RBC AUTO: 88 FL (ref 78–100)
P AXIS - MUSE: 74 DEGREES
PLATELET # BLD AUTO: 297 10E3/UL (ref 150–450)
POTASSIUM SERPL-SCNC: 4.4 MMOL/L (ref 3.4–5.3)
PR INTERVAL - MUSE: 124 MS
QRS DURATION - MUSE: 102 MS
QT - MUSE: 370 MS
QTC - MUSE: 474 MS
R AXIS - MUSE: 99 DEGREES
RBC # BLD AUTO: 4.78 10E6/UL (ref 3.8–5.2)
SODIUM SERPL-SCNC: 141 MMOL/L (ref 135–145)
SYSTOLIC BLOOD PRESSURE - MUSE: 183 MMHG
T AXIS - MUSE: 61 DEGREES
TROPONIN T SERPL HS-MCNC: 8 NG/L
VENTRICULAR RATE- MUSE: 99 BPM
WBC # BLD AUTO: 9.7 10E3/UL (ref 4–11)

## 2024-08-18 PROCEDURE — 250N000011 HC RX IP 250 OP 636: Performed by: EMERGENCY MEDICINE

## 2024-08-18 PROCEDURE — 96374 THER/PROPH/DIAG INJ IV PUSH: CPT | Mod: 59

## 2024-08-18 PROCEDURE — 96361 HYDRATE IV INFUSION ADD-ON: CPT

## 2024-08-18 PROCEDURE — 71275 CT ANGIOGRAPHY CHEST: CPT

## 2024-08-18 PROCEDURE — 85014 HEMATOCRIT: CPT | Performed by: EMERGENCY MEDICINE

## 2024-08-18 PROCEDURE — 99285 EMERGENCY DEPT VISIT HI MDM: CPT | Mod: 25

## 2024-08-18 PROCEDURE — 80048 BASIC METABOLIC PNL TOTAL CA: CPT | Performed by: EMERGENCY MEDICINE

## 2024-08-18 PROCEDURE — 86618 LYME DISEASE ANTIBODY: CPT

## 2024-08-18 PROCEDURE — 84484 ASSAY OF TROPONIN QUANT: CPT | Performed by: EMERGENCY MEDICINE

## 2024-08-18 PROCEDURE — 258N000003 HC RX IP 258 OP 636: Performed by: EMERGENCY MEDICINE

## 2024-08-18 PROCEDURE — 84703 CHORIONIC GONADOTROPIN ASSAY: CPT | Performed by: EMERGENCY MEDICINE

## 2024-08-18 PROCEDURE — 93005 ELECTROCARDIOGRAM TRACING: CPT | Performed by: EMERGENCY MEDICINE

## 2024-08-18 PROCEDURE — 36415 COLL VENOUS BLD VENIPUNCTURE: CPT | Performed by: EMERGENCY MEDICINE

## 2024-08-18 PROCEDURE — 96375 TX/PRO/DX INJ NEW DRUG ADDON: CPT

## 2024-08-18 RX ORDER — IOPAMIDOL 755 MG/ML
75 INJECTION, SOLUTION INTRAVASCULAR ONCE
Status: COMPLETED | OUTPATIENT
Start: 2024-08-18 | End: 2024-08-18

## 2024-08-18 RX ORDER — MORPHINE SULFATE 4 MG/ML
4 INJECTION, SOLUTION INTRAMUSCULAR; INTRAVENOUS ONCE
Status: COMPLETED | OUTPATIENT
Start: 2024-08-18 | End: 2024-08-18

## 2024-08-18 RX ORDER — DIPHENHYDRAMINE HYDROCHLORIDE 50 MG/ML
25 INJECTION INTRAMUSCULAR; INTRAVENOUS ONCE
Status: COMPLETED | OUTPATIENT
Start: 2024-08-18 | End: 2024-08-18

## 2024-08-18 RX ORDER — KETOROLAC TROMETHAMINE 15 MG/ML
15 INJECTION, SOLUTION INTRAMUSCULAR; INTRAVENOUS ONCE
Status: COMPLETED | OUTPATIENT
Start: 2024-08-18 | End: 2024-08-18

## 2024-08-18 RX ORDER — METOCLOPRAMIDE HYDROCHLORIDE 5 MG/ML
10 INJECTION INTRAMUSCULAR; INTRAVENOUS ONCE
Status: COMPLETED | OUTPATIENT
Start: 2024-08-18 | End: 2024-08-18

## 2024-08-18 RX ADMIN — METOCLOPRAMIDE HYDROCHLORIDE 10 MG: 5 INJECTION INTRAMUSCULAR; INTRAVENOUS at 16:20

## 2024-08-18 RX ADMIN — IOPAMIDOL 75 ML: 755 INJECTION, SOLUTION INTRAVENOUS at 14:43

## 2024-08-18 RX ADMIN — KETOROLAC TROMETHAMINE 15 MG: 15 INJECTION, SOLUTION INTRAMUSCULAR; INTRAVENOUS at 13:40

## 2024-08-18 RX ADMIN — MORPHINE SULFATE 4 MG: 4 INJECTION, SOLUTION INTRAMUSCULAR; INTRAVENOUS at 14:36

## 2024-08-18 RX ADMIN — DIPHENHYDRAMINE HYDROCHLORIDE 25 MG: 50 INJECTION INTRAMUSCULAR; INTRAVENOUS at 16:19

## 2024-08-18 RX ADMIN — SODIUM CHLORIDE 1000 ML: 9 INJECTION, SOLUTION INTRAVENOUS at 13:41

## 2024-08-18 ASSESSMENT — ACTIVITIES OF DAILY LIVING (ADL)
ADLS_ACUITY_SCORE: 35

## 2024-08-18 NOTE — ED PROVIDER NOTES
EMERGENCY DEPARTMENT ENCOUnter      NAME: Yeimy Blackmon  AGE: 44 year old female  YOB: 1980  MRN: [id number]  EVALUATION DATE & TIME: 2024 12:50 PM    PCP: Rosemarie Livingston    ED PROVIDER: Tyler Shah DO      Chief Complaint   Patient presents with    Chest Pain    Headache     Tachycardia           FINAL IMPRESSION:  1. Tachycardia    2. Chest pain, unspecified type    3. Other migraine without status migrainosus, not intractable          ED COURSE & MEDICAL DECISION MAKIN:15 PM I met the patient in room 2 and performed my initial interview and exam. Discussed workup in the emergency department, management of symptoms, and likely disposition.    2:31 PM Per nursing report, Toradol did not help the patient's symptoms.   2:57 PM Patient was signed out to Dr. Flynn.       The patient presented to the emergency department today complaining of tachycardia and headache.  She also notes some chest discomfort.  Here in the ER heart rates have mostly been in the  range.  Initial laboratory testing is unremarkable.  CT of the chest has been ordered and is pending at the time of signout to the oncoming provider.  See their note for further details.        Medical Decision Making  Obtained supplemental history:Supplemental history obtained?: Family Member/Significant Other  Reviewed external records: External records reviewed?: Inpatient Record: [facility] Admission - and Outpatient Record: [facility] ED   Care impacted by chronic illness:N/A  Care significantly affected by social determinants of health:Access to Medical Care  Did you consider but not order tests?: Work up considered but not performed and documented in chart, if applicable  Did you interpret images independently?: Independent interpretation of ECG and images noted in documentation, when applicable.  Consultation discussion with other provider:Did you involve another provider (consultant, MH, pharmacy, etc.)?:  No  Admission considered. Patient was signed out to the oncoming physician, disposition pending.    At the conclusion of the encounter I discussed the results of all of the tests and the disposition. The questions were answered. The patient or family acknowledged understanding and was agreeable with the care plan.       =================================================================    HPI        Yeimy Blackmon is a 44 year old female with a pertinent history of migraine headaches who presents to this ED by walk in for evaluation of tachycardia, chest pain, and headache.     Per chart review, patient was admitted to [facility] on 8/2/24-8/4/24 for chest tightness, ST depression in inferior leads, and RBBB, all resolved. Chest pain likely due to taking too much ibuprofen. She was started on pantoprazole 40 mg po. Patient also has been having migraines over the last week. She took about 1 sumatriptan each day and 3 to 4 pills of 200 mg ibuprofen 1-2 times a day in the past week. She used to take amitriptyline for prevention but has been off it for a few years. She has tingling on her face, bilateral arms when and before the headache, likely aura. MRI brain negative. Recommended to start propranolol 5 mg twice daily and magnesium oxide 400 mg daily. Creatinine is 0.96 up from her baseline 0.8-0.9. eGFR is 75. This is likely affected by ibuprofen doses. Given fluids. Discharged home.     Per chart review, the patient was seen in the [facility] ED on 8/11 for chest pain and abdominal pain. She described the chest pain more as acid reflux type symptoms and had been taking the antacids that she was discharged with on 8/4. Patient's EKG here showed sinus rhythm with left atrial enlargement. There was no ST elevations or depressions. No ectopy. Right axis deviation. Incomplete right bundle branch block. Laboratory evaluation here with creatinine 1.00, baseline 0.8. She did get 1 L of fluid. Remainder of labs  unremarkable. Urinalysis here without evidence for infection. CT abdomen/pelvis without acute issues noted. Discharge home with medications for gastritis versus GERD (Pepcid 20 mg twice daily for 14 days, omeprazole 40 mg daily for 14 days, and Carafate 1 g four times per day for 14 days).     Per the patient, since late July, she has been tachycardic with heart rates fluctuating from 90 to 130 at rest. Her heart rate is usually in the 60's at rest. She endorses constant sternal chest pain that also started in late July as more of a burning sensation but reports that this chest pain has become more of a soreness that intermittently radiates to her back. The chest pain does not worsen with deep breaths. She states that her symptoms of tachycardia and chest pain are the same symptoms that she has been seen for in the ED these past few weeks.     The patient also endorses headaches since late July. When she was seen for headaches on 8/2 and 8/11, she described the headache as more of an aura migraine. Today, she states that her headache is more similar to her usual throbbing migraine headaches. She has taken medications for this throbbing headache without relief.     The patient denies abdominal pain today. She has not been eating a lot but has been able to keep food down. She reports that eating can sometimes make the chest pain worse.     Of note, her menstrual period started on 8/15 (3 days ago). She has an appointment with her PCP scheduled for tomorrow (8/19) which is her first appointment with her PCP since all her symptoms started in late July. Patient's blood pressure has also been higher than usual, noting recent systolic blood pressures in the 140's. No other concerns at this time.       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     low egg count, took 5 years to get pregnant with her  first    Kidney stone 05/24/2022    dx with uti and 7 mm stone, stent [facility] at [facility]    Migraine     Normal delivery 11/01/2016    Normal delivery 02/03/2019       PAST SURGICAL HISTORY:  Past Surgical History:   Procedure Laterality Date    cystoscopy with right ureteral stent placement  05/25/2022    R 7 mm stone with uti at the time, hospitalized           CURRENT MEDICATIONS:    acetaminophen-caffeine (EXCEDRIN TENSION HEADACHE) 500-65 MG TABS  famotidine (PEPCID) 20 MG tablet  levonorgestrel-ethinyl estradiol (SEASONALE) 0.15-0.03 MG tablet  magnesium oxide (MAG-OX) 400 MG tablet  multivitamin, therapeutic (THERA-VIT) TABS tablet  omeprazole (PRILOSEC) 40 MG DR capsule  rizatriptan (MAXALT-MLT) 10 MG ODT  sucralfate (CARAFATE) 1 GM/10ML suspension  venlafaxine (EFFEXOR XR) 37.5 MG 24 hr capsule        ALLERGIES:  No Known Allergies    FAMILY HISTORY:  Family History   Problem Relation Age of Onset    Rheumatoid Arthritis Mother     No Known Problems Father     Cancer Maternal Uncle         bladder    Rheumatoid Arthritis Maternal Uncle     Heart Disease Maternal Grandmother     Brain Cancer Maternal Grandmother     Early Death Maternal Grandfather         unknown cause    Rheumatoid Arthritis Maternal Uncle        SOCIAL HISTORY:   Social History     Socioeconomic History    Marital status:      Spouse name: Aristeo    Number of children: 2    Years of education: None    Highest education level: None   Occupational History    Occupation:      Comment: [employer]   Tobacco Use    Smoking status: Never     Passive exposure: Never    Smokeless tobacco: Never   Vaping Use    Vaping status: Never Used   Substance and Sexual Activity    Alcohol use: No     Comment: Alcoholic Drinks/day: 1-2 times a year    Drug use: No    Sexual activity: Yes     Partners: Male     Birth control/protection: Surgical     Comment: vasectomy     Social Determinants of Health     Financial Resource  Strain: Low Risk  (8/19/2024)    Financial Resource Strain     Within the past 12 months, have you or your family members you live with been unable to get utilities (heat, electricity) when it was really needed?: No   Food Insecurity: Low Risk  (8/19/2024)    Food Insecurity     Within the past 12 months, did you worry that your food would run out before you got money to buy more?: No     Within the past 12 months, did the food you bought just not last and you didn t have money to get more?: No   Transportation Needs: Low Risk  (8/19/2024)    Transportation Needs     Within the past 12 months, has lack of transportation kept you from medical appointments, getting your medicines, non-medical meetings or appointments, work, or from getting things that you need?: No   Physical Activity: Inactive (8/19/2024)    Exercise Vital Sign     Days of Exercise per Week: 0 days     Minutes of Exercise per Session: 0 min   Stress: No Stress Concern Present (8/19/2024)    [employer] - Occupational Stress Questionnaire     Feeling of Stress : Only a little   Social Connections: Unknown (8/19/2024)    Social Connection and Isolation Panel [NHANES]     Frequency of Social Gatherings with Friends and Family: Once a week   Interpersonal Safety: Low Risk  (2/26/2024)    Interpersonal Safety     Do you feel physically and emotionally safe where you currently live?: Yes     Within the past 12 months, have you been hit, slapped, kicked or otherwise physically hurt by someone?: No     Within the past 12 months, have you been humiliated or emotionally abused in other ways by your partner or ex-partner?: No   Housing Stability: Low Risk  (8/19/2024)    Housing Stability     Do you have housing? : Yes     Are you worried about losing your housing?: No       VITALS:  No data found.      PHYSICAL EXAM    Constitutional:  Well developed, Well nourished, intermittently tearful  HENT:  Normocephalic, Atraumatic,  Oropharynx moist, Nose normal.   Eyes:  EOMI, Conjunctiva normal, No discharge.   Respiratory:  Normal breath sounds, No respiratory distress, No wheezing, No chest tenderness.   Cardiovascular:  Borderline tachycardic, Normal rhythm, No murmurs  GI:  Soft, No tenderness, No guarding, No CVA tenderness.   Musculoskeletal:  No tenderness to palpation or major deformities noted.   Extremities: No lower extremity edema.  Neurologic:  Alert & oriented x 3, No focal deficits noted.   Psychiatric:  Affect normal, Judgment normal, Mood normal.        LAB:  All pertinent labs reviewed and interpreted.  Results for orders placed or performed during the hospital encounter of 08/18/24              CBC with platelets   Result Value Ref Range    WBC Count 9.7 4.0 - 11.0 10e3/uL    RBC Count 4.78 3.80 - 5.20 10e6/uL    Hemoglobin 14.3 11.7 - 15.7 g/dL    Hematocrit 42.2 35.0 - 47.0 %    MCV 88 78 - 100 fL    MCH 29.9 26.5 - 33.0 pg    MCHC 33.9 31.5 - 36.5 g/dL    RDW 12.4 10.0 - 15.0 %    Platelet Count 297 150 - 450 10e3/uL   Basic metabolic panel   Result Value Ref Range    Sodium 141 135 - 145 mmol/L    Potassium 4.4 3.4 - 5.3 mmol/L    Chloride 106 98 - 107 mmol/L    Carbon Dioxide (CO2) 24 22 - 29 mmol/L    Anion Gap 11 7 - 15 mmol/L    Urea Nitrogen 8.2 6.0 - 20.0 mg/dL    Creatinine 1.04 (H) 0.51 - 0.95 mg/dL    GFR Estimate 68 >60 mL/min/1.73m2    Calcium 9.6 8.8 - 10.4 mg/dL    Glucose 102 (H) 70 - 99 mg/dL   Result Value Ref Range    Troponin T, High Sensitivity 8 <=14 ng/L   hCG Qualitative Pregnancy   Result Value Ref Range    hCG Serum Qualitative Negative Negative         EKG:    Normal sinus rhythm at 99 bpm.  Normal axis.  No signs of acute ischemia.   ms, QTc 474 ms.    I have independently reviewed and interpreted this EKG          Jalli RIOJAS, am serving as a scribe to document services personally performed by Dr. Shah based on my observation and the provider's statements to me. Tyler RIOJAS,  DO attest that Jalil BETTY Yap is acting in a scribe capacity, has observed my performance of the services and has documented them in accordance with my direction.    Tyler Shah DO  Emergency Medicine  [facility] EMERGENCY ROOM  [address] [address]  [phone]  Dept: [phone]     Tyler Shah DO  08/20/24 [id number]

## 2024-08-18 NOTE — ED TRIAGE NOTES
Pt here with concern for fast heart rate and headache. Started yesterday, headache and fast heart rate. CP is on and off since yesterday. Pt states this has happened before.

## 2024-08-18 NOTE — DISCHARGE INSTRUCTIONS
The Reglan should help with your headache, he may feel little bit sleepy from the Benadryl and Reglan, that is normal it should wear off in a couple of hours.    Please keep your appointment with your primary care doctor tomorrow.    If heart rate remains 130 or above while resting, and does not improve after 30 minutes, please come to the emergency department.    Otherwise, since you stopped the prednisone for couple days, I do not recommend restarting.

## 2024-08-18 NOTE — ED PROVIDER NOTES
Progress Note    The patient was signed out to me by Dr. Shah.    Brief HPI: The patient reports since late July, she has been tachycardic with heart rates fluctuating from 90 to 130 at rest. Heart rate is usually in the 60's at rest. She endorses constant sternal chest pain that also started in late July that intermittently radiates to her back. The chest pain does not worsen with deep breaths. She states that her symptoms of tachycardia and chest pain are the same symptoms that she has been seen for in the ED these past few weeks.      The patient also endorses headaches since late July. It is more similar to her usual throbbing migraine headaches. She has taken medications for this throbbing headache without relief.      She denies any other complaints at this time.     ED Course as of 08/20/24 1451   Sun Aug 18, 2024   1456 Signout: 45 yo F who is here with chest discomfort and random accelerations of her heart rate at rest. She's been seen for similar symptoms recently, but no PE rule out yet. Labwork, chest CT ordered. Here on the monitor she has not had significant tachycardia or arrhythmia. She has a hx of migraine as well.   1524 CT Chest Pulmonary Embolism w Contrast  No pulmonary emboli or other acute finding in the chest.   1541 I reevaluated the patient      1605 I sat down with the patient discussed her symptoms and results.  She still has a significant headache, I ordered Reglan and Benadryl.  We discussed reassuring workup here, no sign of arrhythmia, cardiac ischemia, electrolyte imbalance.  I encouraged her to continue her PPI and H2 blocker.  She is still on a taper of prednisone,and this could be making her feel off/anxious and precipitate mild tachycardia. I recommended stopping completely because she had only taken 7 days and stopped 2 days ago.       Final diagnoses:   Tachycardia   Chest pain, unspecified type   Other migraine without status migrainosus, not intractable       Tim RIOJAS  Mia, am serving as a scribe to document services personally performed by Norris Flynn MD, based on my observations and the provider's statements to me. I, Norris Flynn MD attest that Tim Bellamy is acting in a scribe capacity, has observed my performance of the services and has documented them in accordance with my direction.      Norris Flynn MD  Emergency Medicine  [facility]       Norris Flynn MD  08/20/24 [id number]

## 2024-08-19 ENCOUNTER — OFFICE VISIT (OUTPATIENT)
Dept: FAMILY MEDICINE | Facility: CLINIC | Age: 44
End: 2024-08-19
Payer: COMMERCIAL

## 2024-08-19 VITALS
HEART RATE: 103 BPM | SYSTOLIC BLOOD PRESSURE: 126 MMHG | BODY MASS INDEX: 20.41 KG/M2 | HEIGHT: 70 IN | WEIGHT: 142.6 LBS | DIASTOLIC BLOOD PRESSURE: 70 MMHG | OXYGEN SATURATION: 98 % | RESPIRATION RATE: 14 BRPM | TEMPERATURE: 98.6 F

## 2024-08-19 DIAGNOSIS — Z12.31 VISIT FOR SCREENING MAMMOGRAM: ICD-10-CM

## 2024-08-19 DIAGNOSIS — R07.9 CHEST PAIN, UNSPECIFIED TYPE: ICD-10-CM

## 2024-08-19 DIAGNOSIS — N92.6 IRREGULAR BLEEDING: ICD-10-CM

## 2024-08-19 DIAGNOSIS — Z00.00 ROUTINE GENERAL MEDICAL EXAMINATION AT A HEALTH CARE FACILITY: Primary | ICD-10-CM

## 2024-08-19 DIAGNOSIS — N32.89 BLADDER WALL THICKENING: ICD-10-CM

## 2024-08-19 PROCEDURE — 99396 PREV VISIT EST AGE 40-64: CPT | Performed by: NURSE PRACTITIONER

## 2024-08-19 PROCEDURE — 99214 OFFICE O/P EST MOD 30 MIN: CPT | Mod: 25 | Performed by: NURSE PRACTITIONER

## 2024-08-19 SDOH — HEALTH STABILITY: PHYSICAL HEALTH: ON AVERAGE, HOW MANY MINUTES DO YOU ENGAGE IN EXERCISE AT THIS LEVEL?: 0 MIN

## 2024-08-19 SDOH — HEALTH STABILITY: PHYSICAL HEALTH: ON AVERAGE, HOW MANY DAYS PER WEEK DO YOU ENGAGE IN MODERATE TO STRENUOUS EXERCISE (LIKE A BRISK WALK)?: 0 DAYS

## 2024-08-19 ASSESSMENT — ANXIETY QUESTIONNAIRES
7. FEELING AFRAID AS IF SOMETHING AWFUL MIGHT HAPPEN: SEVERAL DAYS
5. BEING SO RESTLESS THAT IT IS HARD TO SIT STILL: NOT AT ALL
2. NOT BEING ABLE TO STOP OR CONTROL WORRYING: SEVERAL DAYS
IF YOU CHECKED OFF ANY PROBLEMS ON THIS QUESTIONNAIRE, HOW DIFFICULT HAVE THESE PROBLEMS MADE IT FOR YOU TO DO YOUR WORK, TAKE CARE OF THINGS AT HOME, OR GET ALONG WITH OTHER PEOPLE: SOMEWHAT DIFFICULT
6. BECOMING EASILY ANNOYED OR IRRITABLE: NOT AT ALL
GAD7 TOTAL SCORE: 5
1. FEELING NERVOUS, ANXIOUS, OR ON EDGE: SEVERAL DAYS
7. FEELING AFRAID AS IF SOMETHING AWFUL MIGHT HAPPEN: SEVERAL DAYS
3. WORRYING TOO MUCH ABOUT DIFFERENT THINGS: SEVERAL DAYS
4. TROUBLE RELAXING: SEVERAL DAYS
8. IF YOU CHECKED OFF ANY PROBLEMS, HOW DIFFICULT HAVE THESE MADE IT FOR YOU TO DO YOUR WORK, TAKE CARE OF THINGS AT HOME, OR GET ALONG WITH OTHER PEOPLE?: SOMEWHAT DIFFICULT
GAD7 TOTAL SCORE: 5
GAD7 TOTAL SCORE: 5

## 2024-08-19 ASSESSMENT — SOCIAL DETERMINANTS OF HEALTH (SDOH): HOW OFTEN DO YOU GET TOGETHER WITH FRIENDS OR RELATIVES?: ONCE A WEEK

## 2024-08-19 ASSESSMENT — PATIENT HEALTH QUESTIONNAIRE - PHQ9
SUM OF ALL RESPONSES TO PHQ QUESTIONS 1-9: 0
10. IF YOU CHECKED OFF ANY PROBLEMS, HOW DIFFICULT HAVE THESE PROBLEMS MADE IT FOR YOU TO DO YOUR WORK, TAKE CARE OF THINGS AT HOME, OR GET ALONG WITH OTHER PEOPLE: NOT DIFFICULT AT ALL
SUM OF ALL RESPONSES TO PHQ QUESTIONS 1-9: 0

## 2024-08-19 ASSESSMENT — PAIN SCALES - GENERAL: PAINLEVEL: MODERATE PAIN (4)

## 2024-08-19 NOTE — TELEPHONE ENCOUNTER
Patient returned call. She has appointment with neurology today.    Joseline Brooks RN  [facility]

## 2024-08-19 NOTE — TELEPHONE ENCOUNTER
Left message to return call. If patient calls back, please route to [facility].     TCs, please send to RNs.    Patrick Lovell RN  [facility]

## 2024-08-19 NOTE — PROGRESS NOTES
Preventive Care Visit  [facility]  Rosemarie Livingston CNP, Nurse Practitioner Primary Care  Aug 19, 2024      Rai Gaffney is a 44 year old, presenting for the following:  Physical (Labs - Non fastings.   Has been in ER x 2 - Stroke or Heart Symptoms, negative for both in ER.  Maybe GI related)        8/19/2024    12:42 PM   Additional Questions   [facility]        Health Care Directive  Patient does not have a Health Care Directive or Living Will: Discussed advance care planning with patient; however, patient declined at this time.    Healthy Habits:     Getting at least 3 servings of Calcium per day:  NO    Bi-annual eye exam:  Yes (Annual)    Dental care twice a year:  Yes    Sleep apnea or symptoms of sleep apnea:  None    Diet:  Regular (no restrictions)    Frequency of exercise:  None    Duration of exercise:  N/A    Taking medications regularly:  Yes    Barriers to taking medications:  None    Medication side effects:  None    Additional concerns today:  Yes (Discuss ER visits)        8/19/2024   General Health   How would you rate your overall physical health? Good   Feel stress (tense, anxious, or unable to sleep) Only a little      (!) STRESS CONCERN      8/19/2024   Nutrition   Three or more servings of calcium each day? Yes   Diet: Regular (no restrictions)   How many servings of fruit and vegetables per day? (!) 2-3   How many sweetened beverages each day? (!) 2            8/19/2024   Exercise   Days per week of moderate/strenous exercise 0 days   Average minutes spent exercising at this level 0 min      (!) EXERCISE CONCERN      8/19/2024   Social Factors   Frequency of gathering with friends or relatives Once a week   Worry food won't last until get money to buy more No   Food not last or not have enough money for food? No   Do you have housing? (Housing is defined as stable permanent housing and does not include staying ouside in a car, in a tent, in an  abandoned building, in an overnight shelter, or couch-surfing.) Yes   Are you worried about losing your housing? No   Lack of transportation? No   Unable to get utilities (heat,electricity)? No            8/19/2024   Dental   Dentist two times every year? Yes            8/19/2024   TB Screening   Were you born outside of the [place]? No          Today's PHQ-9 Score:       8/19/2024    12:42 PM   PHQ-9 SCORE   PHQ-9 Total Score MyChart 0   PHQ-9 Total Score 0         8/19/2024   Substance Use   Alcohol more than 3/day or more than 7/wk Not Applicable   Do you use any other substances recreationally? No        Social History     Tobacco Use    Smoking status: Never     Passive exposure: Never    Smokeless tobacco: Never   Vaping Use    Vaping status: Never Used   Substance Use Topics    Alcohol use: No     Comment: Alcoholic Drinks/day: 1-2 times a year    Drug use: No           9/1/2022   LAST FHS-7 RESULTS   1st degree relative breast or ovarian cancer No   Any relative bilateral breast cancer No   Any male have breast cancer No   Any ONE woman have BOTH breast AND ovarian cancer No   Any woman with breast cancer before 50yrs No   2 or more relatives with breast AND/OR ovarian cancer No   2 or more relatives with breast AND/OR bowel cancer No           Mammogram Screening - Mammogram every 1-2 years updated in Health Maintenance based on mutual decision making        8/19/2024   STI Screening   New sexual partner(s) since last STI/HIV test? No        History of abnormal Pap smear: No - age 30- 64 PAP with HPV every 5 years recommended        Latest Ref Rng & Units 3/1/2021     4:40 PM 3/26/2019     9:07 AM 8/3/2018     5:22 PM   PAP / HPV   PAP Negative for squamous intraepithelial lesion or malignancy. Negative for squamous intraepithelial lesion or malignancy  Electronically signed by Riri Pa CT (ASCP) on 3/9/2021 at  4:44 PM    Negative for squamous intraepithelial lesion or malignancy  Electronically  signed by Margarita Gore CT (ASCP) on 4/3/2019 at  8:58 AM    Negative for squamous intraepithelial lesion or malignancy  Electronically signed by Margarita Gore CT (ASCP) on 8/10/2018 at  7:33 AM      HPV 16 DNA NEG Negative  Negative  Negative    HPV 18 DNA NEG Negative  Negative  Negative    Other HR HPV NEG Negative  Negative  Negative      ASCVD Risk   The 10-year ASCVD risk score (Derek LINDO, et al., 2019) is: 0.5%    Values used to calculate the score:      Age: 44 years      Sex: Female      Is Non- : No      Diabetic: No      Tobacco smoker: No      Systolic Blood Pressure: 126 mmHg      Is BP treated: No      HDL Cholesterol: 45 mg/dL      Total Cholesterol: 141 mg/dL        8/19/2024   Contraception/Family Planning   Questions about contraception or family planning No           Reviewed and updated as needed this visit by Provider                    Past Medical History:   Diagnosis Date    De Quervain's syndrome (tenosynovitis) 09/01/2019    bilateral    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  Recent ER visits for migraines and chest pain.  Symptoms persist.  Migraines respond to medications but return when medication wears off.  Fatigue.  Bladder wall was noted to be thickened on recent CT.  No UTI symptoms.  Menses irregular.      Objective    Exam  /70 (BP Location: Left arm, Patient  "Position: Sitting, Cuff Size: Adult Regular)   Pulse 103   Temp 98.6  F (37  C) (Oral)   Resp 14   Ht 1.778 m (5' 10\")   Wt 64.7 kg (142 lb 9.6 oz)   LMP 08/15/2024 (Exact Date)   SpO2 98%   Breastfeeding No   BMI 20.46 kg/m     Estimated body mass index is 20.46 kg/m  as calculated from the following:    Height as of this encounter: 1.778 m (5' 10\").    Weight as of this encounter: 64.7 kg (142 lb 9.6 oz).    Physical Exam  GENERAL: alert and no distress  EYES: Eyes grossly normal to inspection, PERRL and conjunctivae and sclerae normal  HENT: ear canals and TM's normal, nose and mouth without ulcers or lesions  NECK: no adenopathy, no asymmetry, masses, or scars  RESP: lungs clear to auscultation - no rales, rhonchi or wheezes  BREAST: normal without masses, tenderness or nipple discharge and no palpable axillary masses or adenopathy  CV: regular rate and rhythm, normal S1 S2, no S3 or S4, no murmur, click or rub, no peripheral edema  ABDOMEN: soft, nontender, no hepatosplenomegaly, no masses and bowel sounds normal  MS: no gross musculoskeletal defects noted, no edema  SKIN: no suspicious lesions or rashes  NEURO: Normal strength and tone, mentation intact and speech normal  PSYCH: mentation appears normal and affect flat    A/P:  1. Routine general medical examination at a health care facility    2. Visit for screening mammogram  - MA Screening Bilateral w/ Aryan; Future    3. Chest pain, unspecified type  - Adult Cardiology Eval  Referral; Future  - LYME DISEASE TOTAL ANTIBODIES WITH REFLEX TO CONFIRMATION; Future    4. Irregular bleeding  - US Pelvic Complete with Transvaginal; Future    5. Bladder wall thickening  - US Abdomen Limited; Future      Signed Electronically by: Rosemarie Livingston CNP    Answers submitted by the patient for this visit:  Patient Health Questionnaire (Submitted on 8/19/2024)  If you checked off any problems, how difficult have these problems made it for you to do your " work, take care of things at home, or get along with other people?: Not difficult at all  PHQ9 TOTAL SCORE: 0  ANIL-7 (Submitted on 8/19/2024)  ANIL 7 TOTAL SCORE: 5

## 2024-08-19 NOTE — PATIENT INSTRUCTIONS
Patient Education   Preventive Care Advice   This is general advice given by our system to help you stay healthy. However, your care team may have specific advice just for you. Please talk to your care team about your preventive care needs.  Nutrition  Eat 5 or more servings of fruits and vegetables each day.  Try wheat bread, brown rice and whole grain pasta (instead of white bread, rice, and pasta).  Get enough calcium and vitamin D. Check the label on foods and aim for 100% of the RDA (recommended daily allowance).  Lifestyle  Exercise at least 150 minutes each week  (30 minutes a day, 5 days a week).  Do muscle strengthening activities 2 days a week. These help control your weight and prevent disease.  No smoking.  Wear sunscreen to prevent skin cancer.  Have a dental exam and cleaning every 6 months.  Yearly exams  See your health care team every year to talk about:  Any changes in your health.  Any medicines your care team has prescribed.  Preventive care, family planning, and ways to prevent chronic diseases.  Shots (vaccines)   HPV shots (up to age 26), if you've never had them before.  Hepatitis B shots (up to age 59), if you've never had them before.  COVID-19 shot: Get this shot when it's due.  Flu shot: Get a flu shot every year.  Tetanus shot: Get a tetanus shot every 10 years.  Pneumococcal, hepatitis A, and RSV shots: Ask your care team if you need these based on your risk.  Shingles shot (for age 50 and up)  General health tests  Diabetes screening:  Starting at age 35, Get screened for diabetes at least every 3 years.  If you are younger than age 35, ask your care team if you should be screened for diabetes.  Cholesterol test: At age 39, start having a cholesterol test every 5 years, or more often if advised.  Bone density scan (DEXA): At age 50, ask your care team if you should have this scan for osteoporosis (brittle bones).  Hepatitis C: Get tested at least once in your life.  STIs (sexually  transmitted infections)  Before age 24: Ask your care team if you should be screened for STIs.  After age 24: Get screened for STIs if you're at risk. You are at risk for STIs (including HIV) if:  You are sexually active with more than one person.  You don't use condoms every time.  You or a partner was diagnosed with a sexually transmitted infection.  If you are at risk for HIV, ask about PrEP medicine to prevent HIV.  Get tested for HIV at least once in your life, whether you are at risk for HIV or not.  Cancer screening tests  Cervical cancer screening: If you have a cervix, begin getting regular cervical cancer screening tests starting at age 21.  Breast cancer scan (mammogram): If you've ever had breasts, begin having regular mammograms starting at age 40. This is a scan to check for breast cancer.  Colon cancer screening: It is important to start screening for colon cancer at age 45.  Have a colonoscopy test every 10 years (or more often if you're at risk) Or, ask your provider about stool tests like a FIT test every year or Cologuard test every 3 years.  To learn more about your testing options, visit:   .  For help making a decision, visit:   https://bit.ly/ns99051.  Prostate cancer screening test: If you have a prostate, ask your care team if a prostate cancer screening test (PSA) at age 55 is right for you.  Lung cancer screening: If you are a current or former smoker ages 50 to 80, ask your care team if ongoing lung cancer screenings are right for you.  For informational purposes only. Not to replace the advice of your health care provider. Copyright   2023 [employer]. All rights reserved. Clinically reviewed by the [facility]. [employer] [id number] - REV 01/24.

## 2024-08-20 ENCOUNTER — MYC MEDICAL ADVICE (OUTPATIENT)
Dept: FAMILY MEDICINE | Facility: CLINIC | Age: 44
End: 2024-08-20
Payer: COMMERCIAL

## 2024-08-20 LAB — B BURGDOR IGG+IGM SER QL: 0.18

## 2024-08-22 ENCOUNTER — HOSPITAL ENCOUNTER (OUTPATIENT)
Dept: ULTRASOUND IMAGING | Facility: CLINIC | Age: 44
Discharge: HOME | End: 2024-08-22
Attending: NURSE PRACTITIONER | Admitting: NURSE PRACTITIONER
Payer: COMMERCIAL

## 2024-08-22 DIAGNOSIS — N92.6 IRREGULAR BLEEDING: ICD-10-CM

## 2024-08-22 DIAGNOSIS — N32.89 BLADDER WALL THICKENING: ICD-10-CM

## 2024-08-22 PROCEDURE — 76857 US EXAM PELVIC LIMITED: CPT

## 2024-08-22 PROCEDURE — 76830 TRANSVAGINAL US NON-OB: CPT

## 2024-08-22 PROCEDURE — 76856 US EXAM PELVIC COMPLETE: CPT

## 2024-08-26 NOTE — PROGRESS NOTES
HEART CARE OUT-PATIENT CONSULTATON NOTE      [facility]  [phone]      Assessment/Recommendations   Assessment: 44 year old female with chest pain     Plan:  Chest pain  Somewhat atypical for CAD/ischemia with minimal CV risk factors (no tobacco, no diabetes, no HTN, no hyperlipidemia, no family history).  Suspect this was all GI irritation from NSAIDs, though the tachycardia does raise concern for an underlying arrhythmia        -Rest echocardiogram      -14 day Zio    Tachycardia   Unclear etiology, certainly could be sinus driven by GI symptoms and resultant anxiety but also consider arrhythmia       -14 day Zio          History of Present Illness/Subjective    Indication for Consult:  I was asked to see Yeimy Blackmon by Rosemarie Craven for chest pain       HPI: Yeimy Blackmon is a 44 year old female with no significant medical history on no cardiac meds who presents for evaluation of chest pain, tachycardia.  She was seen in the ER twice recently with negative ECG and hs troponin TSH and Hgb normal.  She was advised to see cardiology in clinic.    She reports chest pain coming and going at first, then became constant, heart rate high, tingling face and head, migraines, seen in ER, overnight observation, treated for GI distress.  Back in ER 10 days later with recurrent chest pain and abdominal pain.  Negative abdominal CT, treated for GERD, symptoms improved.  One week later back in ER due to tachycardia, even minimal movement HR spike to 130-140, stayed high even after sit and rest, TSH normal.  Heart rate still spiking but comes back to normal faster, once in a while chest pain at random, no dyspnea, orthopnea, PND.        I reviewed notes from PCP, ER prior to this visit.         Physical Examination  Past Cardiac History   Vitals: /62 (BP Location: Right arm, Patient Position: Sitting, Cuff Size: Adult Regular)   Pulse 94   Resp 14   Wt 67.1 kg (148 lb)   LMP  08/15/2024 (Exact Date)   BMI 21.24 kg/m    BMI= Body mass index is 21.24 kg/m .  Wt Readings from Last 3 Encounters:   09/04/24 67.1 kg (148 lb)   08/19/24 64.7 kg (142 lb 9.6 oz)   08/18/24 68 kg (150 lb)       General Appearance:   no distress, normal body habitus   ENT/Mouth: membranes moist, no oral lesions or bleeding gums.      EYES:  no scleral icterus, normal conjunctivae   Neck: no carotid bruits or thyromegaly   Chest/Lungs:   lungs are clear to auscultation, no rales or wheezing,  sternal scar, equal chest wall expansion    Cardiovascular:   Regular. Normal first and second heart sounds with no murmurs, rubs, or gallops; the carotid, radial and posterior tibial pulses are intact, Jugular venous pressure normal, No edema bilaterally    Abdomen:  no organomegaly, masses, bruits, or tenderness; bowel sounds are present   Extremities: no cyanosis or clubbing   Skin: no xanthelasma, warm.    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     low egg count, took 5 years to get pregnant with her first    Kidney stone 05/24/2022    dx with uti and 7 mm stone, stent placed at [facility]     Normal delivery 11/01/2016    Normal delivery 02/03/2019     Family History   Problem Relation Age of Onset    Rheumatoid Arthritis Mother     No Known Problems Father     Cancer Maternal Uncle         bladder    Rheumatoid Arthritis Maternal Uncle     Heart Disease Maternal Grandmother     Brain Cancer Maternal Grandmother     Early Death Maternal Grandfather         unknown cause    Rheumatoid Arthritis Maternal Uncle         Social  History     Socioeconomic History    Marital status:      Spouse name: Aristeo    Number of children: 2    Years of education: Not on file    Highest education level: Not on file   Occupational History    Occupation:      Comment: [employer]   Tobacco Use    Smoking status: Never     Passive exposure: Never    Smokeless tobacco: Never   Vaping Use    Vaping status: Never Used   Substance and Sexual Activity    Alcohol use: No     Comment: Alcoholic Drinks/day: 1-2 times a year    Drug use: No    Sexual activity: Yes     Partners: Male     Birth control/protection: Surgical     Comment: vasectomy   Other Topics Concern    Not on file   Social History Narrative    Not on file     Social Determinants of Health     Financial Resource Strain: Low Risk  (8/19/2024)    Financial Resource Strain     Within the past 12 months, have you or your family members you live with been unable to get utilities (heat, electricity) when it was really needed?: No   Food Insecurity: Low Risk  (8/19/2024)    Food Insecurity     Within the past 12 months, did you worry that your food would run out before you got money to buy more?: No     Within the past 12 months, did the food you bought just not last and you didn t have money to get more?: No   Transportation Needs: Low Risk  (8/19/2024)    Transportation Needs     Within the past 12 months, has lack of transportation kept you from medical appointments, getting your medicines, non-medical meetings or appointments, work, or from getting things that you need?: No   Physical Activity: Inactive (8/19/2024)    Exercise Vital Sign     Days of Exercise per Week: 0 days     Minutes of Exercise per Session: 0 min   Stress: No Stress Concern Present (8/19/2024)    [employer] [place] [employer] - Occupational Stress Questionnaire     Feeling of Stress : Only a little   Social Connections: Unknown (8/19/2024)    Social Connection and Isolation Panel [NHANES]      Frequency of Communication with Friends and Family: Not on file     Frequency of Social Gatherings with Friends and Family: Once a week     Attends Jew Services: Not on file     Active Member of Clubs or Organizations: Not on file     Attends Club or Organization Meetings: Not on file     Marital Status: Not on file   Interpersonal Safety: Low Risk  (2/26/2024)    Interpersonal Safety     Do you feel physically and emotionally safe where you currently live?: Yes     Within the past 12 months, have you been hit, slapped, kicked or otherwise physically hurt by someone?: No     Within the past 12 months, have you been humiliated or emotionally abused in other ways by your partner or ex-partner?: No   Housing Stability: Low Risk  (8/19/2024)    Housing Stability     Do you have housing? : Yes     Are you worried about losing your housing?: No           Medications  Allergies   Current Outpatient Medications   Medication Sig Dispense Refill    acetaminophen-caffeine (EXCEDRIN TENSION HEADACHE) 500-65 MG TABS Take 1-2 tablets by mouth every 6 hours as needed for mild pain      levonorgestrel-ethinyl estradiol (SEASONALE) 0.15-0.03 MG tablet TAKE 1 TABLET BY MOUTH EVERY DAY 91 tablet 4    magnesium oxide (MAG-OX) 400 MG tablet Take 1 tablet (400 mg) by mouth daily 30 tablet 0    multivitamin, therapeutic (THERA-VIT) TABS tablet Take 1 tablet by mouth daily      rizatriptan (MAXALT-MLT) 10 MG ODT Take 1 tablet (10 mg) by mouth at onset of headache for migraine May repeat in 2 hours. Max 3 tablets/24 hours. 12 tablet 4    venlafaxine (EFFEXOR XR) 37.5 MG 24 hr capsule Take 1 capsule (37.5 mg) by mouth daily 30 capsule 6     No Known Allergies       Lab Results    Chemistry/lipid CBC Cardiac Enzymes/BNP/TSH/INR   Recent Labs   Lab Test 08/03/24  0424   CHOL 141   HDL 45*   LDL 83   TRIG 63     Recent Labs   Lab Test 08/03/24  0424 07/27/22  1515 03/01/21  1524   LDL 83 95 67     Recent Labs   Lab Test 08/18/24  1336  "     POTASSIUM 4.4   CHLORIDE 106   CO2 24   *   BUN 8.2   CR 1.04*   GFRESTIMATED 68   CORDELIA 9.6     Recent Labs   Lab Test 08/18/24  1336 08/11/24  2311 08/03/24  1352   CR 1.04* 1.00* 0.86     Recent Labs   Lab Test 08/02/24  1254 02/26/24  1301   A1C 5.4 5.3          Recent Labs   Lab Test 08/18/24  1336   WBC 9.7   HGB 14.3   HCT 42.2   MCV 88        Recent Labs   Lab Test 08/18/24  1336 08/11/24  2311 08/03/24  0424   HGB 14.3 13.1 12.0    No results for input(s): \"TROPONINI\" in the last 79686 hours.  No results for input(s): \"BNP\", \"NTBNPI\", \"NTBNP\" in the last 58246 hours.  Recent Labs   Lab Test 08/02/24  1527   TSH 1.15     No results for input(s): \"INR\" in the last 38562 hours.     Bettina Cooper MD  Noninvasive Cardiologist   [facility]                                    "

## 2024-09-04 ENCOUNTER — OFFICE VISIT (OUTPATIENT)
Dept: CARDIOLOGY | Facility: CLINIC | Age: 44
End: 2024-09-04
Attending: NURSE PRACTITIONER
Payer: COMMERCIAL

## 2024-09-04 VITALS
BODY MASS INDEX: 21.24 KG/M2 | RESPIRATION RATE: 14 BRPM | DIASTOLIC BLOOD PRESSURE: 62 MMHG | SYSTOLIC BLOOD PRESSURE: 119 MMHG | HEART RATE: 94 BPM | WEIGHT: 148 LBS

## 2024-09-04 DIAGNOSIS — R07.9 CHEST PAIN, UNSPECIFIED TYPE: ICD-10-CM

## 2024-09-04 DIAGNOSIS — R00.2 PALPITATIONS: ICD-10-CM

## 2024-09-04 DIAGNOSIS — R00.0 TACHYCARDIA: Primary | ICD-10-CM

## 2024-09-04 PROCEDURE — 99204 OFFICE O/P NEW MOD 45 MIN: CPT | Performed by: INTERNAL MEDICINE

## 2024-09-04 NOTE — PATIENT INSTRUCTIONS
I agree the symptoms are likely a result of some GI irritation due to the NSAIDs.  However, with the faster heart rates I do think it would be helpful to exclude a heart arrhythmia with an echocardiogram and a heart monitor.

## 2024-09-04 NOTE — LETTER
9/4/2024    Rosemarie Livingston, CNP  [address]  [place] [address]    RE: Yeimy Blackmon       Dear Colleague,     I had the pleasure of seeing Yeimy Blackmon in the [facility].    HEART CARE OUT-PATIENT CONSULTATON NOTE      [facility]  [phone]      Assessment/Recommendations   Assessment: 44 year old female with chest pain     Plan:  Chest pain  Somewhat atypical for CAD/ischemia with minimal CV risk factors (no tobacco, no diabetes, no HTN, no hyperlipidemia, no family history).  Suspect this was all GI irritation from NSAIDs, though the tachycardia does raise concern for an underlying arrhythmia        -Rest echocardiogram      -14 day Zio    Tachycardia   Unclear etiology, certainly could be sinus driven by GI symptoms and resultant anxiety but also consider arrhythmia       -14 day Zio          History of Present Illness/Subjective    Indication for Consult:  I was asked to see Yeimy Blackmon by Rosemarie Craven for chest pain       HPI: Yeimy Blackmon is a 44 year old female with no significant medical history on no cardiac meds who presents for evaluation of chest pain, tachycardia.  She was seen in the ER twice recently with negative ECG and hs troponin TSH and Hgb normal.  She was advised to see cardiology in clinic.    She reports chest pain coming and going at first, then became constant, heart rate high, tingling face and head, migraines, seen in ER, overnight observation, treated for GI distress.  Back in ER 10 days later with recurrent chest pain and abdominal pain.  Negative abdominal CT, treated for GERD, symptoms improved.  One week later back in ER due to tachycardia, even minimal movement HR spike to 130-140, stayed high even after sit and rest, TSH normal.  Heart rate still spiking but comes back to normal faster, once in a while chest pain at random, no dyspnea, orthopnea, PND.        I reviewed notes from PCP, ER prior to this  visit.         Physical Examination  Past Cardiac History   Vitals: /62 (BP Location: Right arm, Patient Position: Sitting, Cuff Size: Adult Regular)   Pulse 94   Resp 14   Wt 67.1 kg (148 lb)   LMP 08/15/2024 (Exact Date)   BMI 21.24 kg/m    BMI= Body mass index is 21.24 kg/m .  Wt Readings from Last 3 Encounters:   09/04/24 67.1 kg (148 lb)   08/19/24 64.7 kg (142 lb 9.6 oz)   08/18/24 68 kg (150 lb)       General Appearance:   no distress, normal body habitus   ENT/Mouth: membranes moist, no oral lesions or bleeding gums.      EYES:  no scleral icterus, normal conjunctivae   Neck: no carotid bruits or thyromegaly   Chest/Lungs:   lungs are clear to auscultation, no rales or wheezing,  sternal scar, equal chest wall expansion    Cardiovascular:   Regular. Normal first and second heart sounds with no murmurs, rubs, or gallops; the carotid, radial and posterior tibial pulses are intact, Jugular venous pressure normal, No edema bilaterally    Abdomen:  no organomegaly, masses, bruits, or tenderness; bowel sounds are present   Extremities: no cyanosis or clubbing   Skin: no xanthelasma, warm.    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      Normal delivery 11/01/2016     Normal delivery 02/03/2019     Family History   Problem Relation Age of Onset     Rheumatoid Arthritis Mother      No Known Problems Father      Cancer Maternal Uncle         bladder     Rheumatoid  Arthritis Maternal Uncle      Heart Disease Maternal Grandmother      Brain Cancer Maternal Grandmother      Early Death Maternal Grandfather         unknown cause     Rheumatoid Arthritis Maternal Uncle         Social History     Socioeconomic History     Marital status:      Spouse name: Aristeo     Number of children: 2     Years of education: Not on file     Highest education level: Not on file   Occupational History     Occupation:      Comment: [employer]   Tobacco Use     Smoking status: Never     Passive exposure: Never     Smokeless tobacco: Never   Vaping Use     Vaping status: Never Used   Substance and Sexual Activity     Alcohol use: No     Comment: Alcoholic Drinks/day: 1-2 times a year     Drug use: No     Sexual activity: Yes     Partners: Male     Birth control/protection: Surgical     Comment: vasectomy   Other Topics Concern     Not on file   Social History Narrative     Not on file     Social Determinants of Health     Financial Resource Strain: Low Risk  (8/19/2024)    Financial Resource Strain      Within the past 12 months, have you or your family members you live with been unable to get utilities (heat, electricity) when it was really needed?: No   Food Insecurity: Low Risk  (8/19/2024)    Food Insecurity      Within the past 12 months, did you worry that your food would run out before you got money to buy more?: No      Within the past 12 months, did the food you bought just not last and you didn t have money to get more?: No   Transportation Needs: Low Risk  (8/19/2024)    Transportation Needs      Within the past 12 months, has lack of transportation kept you from medical appointments, getting your medicines, non-medical meetings or appointments, work, or from getting things that you need?: No   Physical Activity: Inactive (8/19/2024)    Exercise Vital Sign      Days of Exercise per Week: 0 days      Minutes of Exercise per Session: 0 min   Stress: No Stress Concern  Present (8/19/2024)    [employer] [place] [employer] - Occupational Stress Questionnaire      Feeling of Stress : Only a little   Social Connections: Unknown (8/19/2024)    Social Connection and Isolation Panel [NHANES]      Frequency of Communication with Friends and Family: Not on file      Frequency of Social Gatherings with Friends and Family: Once a week      Attends Muslim Services: Not on file      Active Member of Clubs or Organizations: Not on file      Attends Club or Organization Meetings: Not on file      Marital Status: Not on file   Interpersonal Safety: Low Risk  (2/26/2024)    Interpersonal Safety      Do you feel physically and emotionally safe where you currently live?: Yes      Within the past 12 months, have you been hit, slapped, kicked or otherwise physically hurt by someone?: No      Within the past 12 months, have you been humiliated or emotionally abused in other ways by your partner or ex-partner?: No   Housing Stability: Low Risk  (8/19/2024)    Housing Stability      Do you have housing? : Yes      Are you worried about losing your housing?: No           Medications  Allergies   Current Outpatient Medications   Medication Sig Dispense Refill     acetaminophen-caffeine (EXCEDRIN TENSION HEADACHE) 500-65 MG TABS Take 1-2 tablets by mouth every 6 hours as needed for mild pain       levonorgestrel-ethinyl estradiol (SEASONALE) 0.15-0.03 MG tablet TAKE 1 TABLET BY MOUTH EVERY DAY 91 tablet 4     magnesium oxide (MAG-OX) 400 MG tablet Take 1 tablet (400 mg) by mouth daily 30 tablet 0     multivitamin, therapeutic (THERA-VIT) TABS tablet Take 1 tablet by mouth daily       rizatriptan (MAXALT-MLT) 10 MG ODT Take 1 tablet (10 mg) by mouth at onset of headache for migraine May repeat in 2 hours. Max 3 tablets/24 hours. 12 tablet 4     venlafaxine (EFFEXOR XR) 37.5 MG 24 hr capsule Take 1 capsule (37.5 mg) by mouth daily 30 capsule 6     No Known Allergies       Lab Results   "  Chemistry/lipid CBC Cardiac Enzymes/BNP/TSH/INR   Recent Labs   Lab Test 08/03/24  0424   CHOL 141   HDL 45*   LDL 83   TRIG 63     Recent Labs   Lab Test 08/03/24  0424 07/27/22  1515 03/01/21  1524   LDL 83 95 67     Recent Labs   Lab Test 08/18/24  1336      POTASSIUM 4.4   CHLORIDE 106   CO2 24   *   BUN 8.2   CR 1.04*   GFRESTIMATED 68   CORDELIA 9.6     Recent Labs   Lab Test 08/18/24  1336 08/11/24  2311 08/03/24  1352   CR 1.04* 1.00* 0.86     Recent Labs   Lab Test 08/02/24  1254 02/26/24  1301   A1C 5.4 5.3          Recent Labs   Lab Test 08/18/24  1336   WBC 9.7   HGB 14.3   HCT 42.2   MCV 88        Recent Labs   Lab Test 08/18/24  1336 08/11/24  2311 08/03/24  0424   HGB 14.3 13.1 12.0    No results for input(s): \"TROPONINI\" in the last 27103 hours.  No results for input(s): \"BNP\", \"NTBNPI\", \"NTBNP\" in the last 39248 hours.  Recent Labs   Lab Test 08/02/24  1527   TSH 1.15     No results for input(s): \"INR\" in the last 46693 hours.     Bettina Cooper MD  Noninvasive Cardiologist   [facility]                                        Thank you for allowing me to participate in the care of your patient.      Sincerely,     Bettina Cooper MD     [facility]  cc:   Rosemarie Livingston, CNP  [address] [place] [address]      "

## 2024-09-11 ENCOUNTER — ORDERS ONLY (AUTO-RELEASED) (OUTPATIENT)
Dept: CARDIOLOGY | Facility: CLINIC | Age: 44
End: 2024-09-11
Payer: COMMERCIAL

## 2024-09-11 DIAGNOSIS — R00.2 PALPITATIONS: ICD-10-CM

## 2024-09-11 DIAGNOSIS — R00.0 TACHYCARDIA: ICD-10-CM

## 2024-09-13 ENCOUNTER — HOSPITAL ENCOUNTER (OUTPATIENT)
Dept: CARDIOLOGY | Facility: CLINIC | Age: 44
Discharge: HOME | End: 2024-09-13
Attending: INTERNAL MEDICINE | Admitting: INTERNAL MEDICINE
Payer: COMMERCIAL

## 2024-09-13 DIAGNOSIS — R00.2 PALPITATIONS: ICD-10-CM

## 2024-09-13 DIAGNOSIS — R07.9 CHEST PAIN, UNSPECIFIED TYPE: ICD-10-CM

## 2024-09-13 LAB — LVEF ECHO: NORMAL

## 2024-09-13 PROCEDURE — 93306 TTE W/DOPPLER COMPLETE: CPT

## 2024-09-13 PROCEDURE — 93306 TTE W/DOPPLER COMPLETE: CPT | Mod: 26 | Performed by: INTERNAL MEDICINE

## 2024-09-23 ENCOUNTER — ALLIED HEALTH/NURSE VISIT (OUTPATIENT)
Dept: FAMILY MEDICINE | Facility: CLINIC | Age: 44
End: 2024-09-23
Payer: COMMERCIAL

## 2024-09-23 DIAGNOSIS — E53.8 B12 DEFICIENCY: Primary | ICD-10-CM

## 2024-09-23 DIAGNOSIS — E53.8 VITAMIN B12 DEFICIENCY (NON ANEMIC): Primary | ICD-10-CM

## 2024-09-23 PROCEDURE — 96372 THER/PROPH/DIAG INJ SC/IM: CPT | Performed by: NURSE PRACTITIONER

## 2024-09-23 PROCEDURE — 99207 PR NO CHARGE NURSE ONLY: CPT

## 2024-09-23 PROCEDURE — 93244 EXT ECG>48HR<7D REV&INTERPJ: CPT | Performed by: INTERNAL MEDICINE

## 2024-09-23 RX ORDER — CYANOCOBALAMIN 1000 UG/ML
1000 INJECTION, SOLUTION INTRAMUSCULAR; SUBCUTANEOUS
Status: ACTIVE | OUTPATIENT
Start: 2024-09-23 | End: 2025-09-18

## 2024-09-23 RX ADMIN — CYANOCOBALAMIN 1000 MCG: 1000 INJECTION, SOLUTION INTRAMUSCULAR; SUBCUTANEOUS at 09:19

## 2024-09-23 NOTE — PROGRESS NOTES
Administrations This Visit       cyanocobalamin injection 1,000 mcg       Admin Date  2024 Action  $Given Dose  1,000 mcg Route  Intramuscular Site  Left Deltoid Documented By  Sandie Azul MA    NDC: 63620-529-73    Lot#: [id number]    : Message Missile    Patient Supplied?: No                  Clinic Administered Medication Documentation      Injectable Medication Documentation    Is there an active order (written within the past 365 days, with administrations remaining, not ) in the chart? Yes.     Patient was given Cyanocobalamin (B-12). Prior to medication administration, verified patient's identity using patient s name and date of birth. Please see MAR and medication order for additional information. Patient instructed to remain in clinic for 15 minutes and report any adverse reaction to staff immediately.    Vial/Syringe: Single dose vial. Was entire vial of medication used? Yes  Was this medication supplied by the patient? No  Is this a medication the patient will need to receive again? Yes. Verified that the patient has refills remaining in their prescription.     Sandie Azul, [facility]

## 2024-12-01 ENCOUNTER — HEALTH MAINTENANCE LETTER (OUTPATIENT)
Age: 44
End: 2024-12-01

## 2024-12-09 NOTE — PROGRESS NOTES
NEUROLOGY FOLLOW UP VISIT  NOTE       [facility] NEUROLOGY [place]  [address] [place] [address]  Tel: [phone]  Fax: [phone]  [email or web address]     Yeimy Blackmon,  1980, MRN [id number]  PCP: Rosemarie Dunne  Date: 2024      ASSESSMENT & PLAN     Visit Diagnosis  Migraine without aura and without status migrainosus, not intractable     Migraine headache without aura  44-year-old female with perimenstrual migraine who returns for follow-up.  She continues to have frequent headaches and at least gets 1 headache in a week that can last for 2 to 3 days.  In the past she had tried Inderal, amitriptyline, nortriptyline, magnesium but continues to be symptomatic.  During her last visit Emgality and Ubrelvy was prescribed but her insurance did not cover and she was switched to Effexor.  I have recommended:    1.  Increase Effexor to 75 mg daily  2.  She will update me in 1 month and if there is no improvement in her headaches I will switch her to Depakote  mg at bedtime  3.  If 2 months of Depakote to ER treatment does not result in any change I will schedule her for Botox.  4.  Prescription for Maxalt was felt  5.  Follow-up in 3 months    Thank you again for this referral, please feel free to contact me if you have any questions.    Luis F Dubose MD  [facility] NEUROLOGY, [place]     HISTORY OF PRESENT ILLNESS     Patient is a 44-year-old female with history of perimenstrual migraine last seen on 2024 for persistent headache and was diagnosed with status migrainosus.  She was treated with prednisone and previously was on Inderal that was discontinued.  Prior to that she had tried amitriptyline, nortriptyline, Imitrex but continued to be symptomatic and therefore was switched to Emgality and given a prescription for Ubrelvy for abortive treatment.  However patient was unable to afford the cost of both medication and was switched to Effexor and  Maxalt.  She has not noticed any change in her headaches with Effexor.  She finds Maxalt to be effective in treating her headaches    Briefly patient is a female with history of migraine headache who was initially evaluated on 8/8/2024 for progressively worsening headache.According to patient she developed migraine headache when she was 4.  She used to get these headaches close to her.  And would take over-the-counter medication.  She was started on amitriptyline, nortriptyline in the past and as she was trying to get pregnant in 2020 she stopped taking those medication.  She eventually got pregnant in 2016 and fortunately had no flareup of her headaches.  She again got pregnant in 2018 but noticed a flareup of her headache and since then has progressively noticed worsening headaches.  These headaches come without any warning.  They are mostly clustered close to her menstrual cycle.  She has some light sensitivity and nausea but denies any focal weakness or numbness.  She usually takes Imitrex and ibuprofen that helps.  She was also tried on Inderal, magnesium, amitriptyline, nortriptyline and Emgality was prescribed along with Ubrelvy but she could not afford the cost and was told to take Effexor in addition to Maxalt.     PROBLEM LIST   Patient Active Problem List   Diagnosis    Migraine Headache    History of kidney stones    Ureterolithiasis    Unstable angina (H)    Acute electrocardiogram changes         PAST MEDICAL & SURGICAL HISTORY     Past Medical History:   Patient  has a past medical history of De Quervain's syndrome (tenosynovitis) (09/01/2019), 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, Kidney stone (05/24/2022), Migraine, Normal delivery (11/01/2016), and Normal delivery (02/03/2019).    Surgical History:  She  has a past surgical history that includes cystoscopy with right ureteral stent placement (05/25/2022).     SOCIAL HISTORY     Reviewed, and she  reports that she has never smoked. She has never been  "exposed to tobacco smoke. She has never used smokeless tobacco. She reports that she does not drink alcohol and does not use drugs.     FAMILY HISTORY     Reviewed, and family history includes Brain Cancer in her maternal grandmother; Cancer in her maternal uncle; Early Death in her maternal grandfather; Heart Disease in her maternal grandmother; No Known Problems in her father; Rheumatoid Arthritis in her maternal uncle, maternal uncle, and mother.     ALLERGIES     No Known Allergies      REVIEW OF SYSTEMS     A 12 point review of system was performed and was negative except as outlined in the history of present illness.     HOME MEDICATIONS     Current Outpatient Rx   Medication Sig Dispense Refill    acetaminophen-caffeine (EXCEDRIN TENSION HEADACHE) 500-65 MG TABS Take 1-2 tablets by mouth every 6 hours as needed for mild pain      levonorgestrel-ethinyl estradiol (SEASONALE) 0.15-0.03 MG tablet TAKE 1 TABLET BY MOUTH EVERY DAY 91 tablet 4    magnesium oxide (MAG-OX) 400 MG tablet Take 1 tablet (400 mg) by mouth daily 30 tablet 0    multivitamin, therapeutic (THERA-VIT) TABS tablet Take 1 tablet by mouth daily      rizatriptan (MAXALT-MLT) 10 MG ODT Take 1 tablet (10 mg) by mouth at onset of headache for migraine May repeat in 2 hours. Max 3 tablets/24 hours. 12 tablet 4    venlafaxine (EFFEXOR XR) 37.5 MG 24 hr capsule Take 1 capsule (37.5 mg) by mouth daily 30 capsule 6         PHYSICAL EXAM     Vital signs  /78 (BP Location: Right arm, Patient Position: Sitting)   Pulse 99   Ht 1.778 m (5' 10\")   Wt 65.8 kg (145 lb)   BMI 20.81 kg/m      Weight:   145 lbs 0 oz    Patient is alert and oriented x4 in no acute distress. Vital signs were reviewed and are documented in electronic medical record. Neck was supple, no carotid bruits, thyromegaly, JVD, or lymphadenopathy was noted.   NEUROLOGY EXAM:   Patient s speech was normal with no aphasia or dysarthria. Mentation, and affect were also normal.    " Funduscopic exam was normal, with normal cup to disc ratio. Cranial nerves II -XII were intact.    Patient had normal mass, tone and motor strength was 5/5 in all extremities without pronator drift.    Sensation was intact to light touch, pinprick, and vibratory sensation.    Reflexes were 1+ symmetrical with downgoing toes.    No dysmetria noted on FNF or HKS. Romberg was negative.   Gait testing was normal. Able to walk on toes/heels. Tandem walk normal.     PERTINENT DIAGNOSTIC STUDIES     Following studies were reviewed:     MRI BRAIN 8/3/2024  Unremarkable head MRI     PERTINENT LABS  Following labs were reviewed:  Hospital Outpatient Visit on 09/13/2024   Component Date Value Ref Range Status    LVEF  09/13/2024 55-60%   Final         Total time spent for face to face visit, reviewing labs/imaging studies, counseling and coordination of care was: 30 Minutes spent on the date of the encounter doing chart review, review of outside records, review of test results, interpretation of tests, patient visit, and documentation     The longitudinal plan of care for the diagnosis(es)/condition(s) as documented were addressed during this visit. Due to the added complexity in care, I will continue to support Yeimy in the subsequent management and with ongoing continuity of care.    This note was dictated using voice recognition software.  Any grammatical or context distortions are unintentional and inherent to the software.    No orders of the defined types were placed in this encounter.     New Prescriptions    No medications on file     Modified Medications    No medications on file

## 2024-12-12 ENCOUNTER — ALLIED HEALTH/NURSE VISIT (OUTPATIENT)
Dept: FAMILY MEDICINE | Facility: CLINIC | Age: 44
End: 2024-12-12
Payer: COMMERCIAL

## 2024-12-12 ENCOUNTER — OFFICE VISIT (OUTPATIENT)
Dept: NEUROLOGY | Facility: CLINIC | Age: 44
End: 2024-12-12
Payer: COMMERCIAL

## 2024-12-12 VITALS
HEIGHT: 70 IN | BODY MASS INDEX: 20.76 KG/M2 | DIASTOLIC BLOOD PRESSURE: 78 MMHG | WEIGHT: 145 LBS | SYSTOLIC BLOOD PRESSURE: 125 MMHG | HEART RATE: 99 BPM

## 2024-12-12 DIAGNOSIS — E53.8 B12 DEFICIENCY: Primary | ICD-10-CM

## 2024-12-12 DIAGNOSIS — G43.009 MIGRAINE WITHOUT AURA AND WITHOUT STATUS MIGRAINOSUS, NOT INTRACTABLE: Primary | ICD-10-CM

## 2024-12-12 RX ORDER — RIZATRIPTAN BENZOATE 10 MG/1
10 TABLET, ORALLY DISINTEGRATING ORAL
Qty: 12 TABLET | Refills: 11 | Status: SHIPPED | OUTPATIENT
Start: 2024-12-12

## 2024-12-12 RX ORDER — VENLAFAXINE HYDROCHLORIDE 75 MG/1
75 CAPSULE, EXTENDED RELEASE ORAL DAILY
Qty: 30 CAPSULE | Refills: 11 | Status: SHIPPED | OUTPATIENT
Start: 2024-12-12

## 2024-12-12 RX ADMIN — CYANOCOBALAMIN 1000 MCG: 1000 INJECTION, SOLUTION INTRAMUSCULAR; SUBCUTANEOUS at 15:40

## 2024-12-12 ASSESSMENT — HEADACHE IMPACT TEST (HIT 6)
WHEN YOU HAVE HEADACHES HOW OFTEN IS THE PAIN SEVERE: SOMETIMES
HIT6 TOTAL SCORE: 61
HIT6 TOTAL SCORE: 61
HOW OFTEN HAVE YOU FELT TOO TIRED TO WORK BECAUSE OF YOUR HEADACHES: SOMETIMES
HOW OFTEN DID HEADACHS LIMIT CONCENTRATION ON WORK OR DAILY ACTIVITY: SOMETIMES
HOW OFTEN HAVE YOU FELT TOO TIRED TO WORK BECAUSE OF YOUR HEADACHES: SOMETIMES
WHEN YOU HAVE A HEADACHE HOW OFTEN DO YOU WISH YOU COULD LIE DOWN: SOMETIMES
WHEN YOU HAVE HEADACHES HOW OFTEN IS THE PAIN SEVERE: SOMETIMES
HOW OFTEN HAVE YOU FELT FED UP OR IRRITATED BECAUSE OF YOUR HEADACHES: VERY OFTEN
HOW OFTEN HAVE YOU FELT FED UP OR IRRITATED BECAUSE OF YOUR HEADACHES: VERY OFTEN
HOW OFTEN DO HEADACHES LIMIT YOUR DAILY ACTIVITIES: SOMETIMES
HOW OFTEN DO HEADACHES LIMIT YOUR DAILY ACTIVITIES: SOMETIMES
HOW OFTEN DID HEADACHS LIMIT CONCENTRATION ON WORK OR DAILY ACTIVITY: SOMETIMES
WHEN YOU HAVE A HEADACHE HOW OFTEN DO YOU WISH YOU COULD LIE DOWN: SOMETIMES

## 2024-12-12 NOTE — LETTER
2024      Yeimy Blackmon  [address]  [place] [address]      Dear Colleague,    Thank you for referring your patient, Yeimy Blackmon, to the [facility] [place]. Please see a copy of my visit note below.    NEUROLOGY FOLLOW UP VISIT  NOTE       [facility] NEUROLOGY [place]  [address] [place] [address]  Tel: [phone]  Fax: [phone]  [email or web address]     Yeimy Blackmon,  1980, MRN [id number]  PCP: Rosemarie Dunne  Date: 2024      ASSESSMENT & PLAN     Visit Diagnosis  Migraine without aura and without status migrainosus, not intractable     Migraine headache without aura  44-year-old female with perimenstrual migraine who returns for follow-up.  She continues to have frequent headaches and at least gets 1 headache in a week that can last for 2 to 3 days.  In the past she had tried Inderal, amitriptyline, nortriptyline, magnesium but continues to be symptomatic.  During her last visit Emgality and Ubrelvy was prescribed but her insurance did not cover and she was switched to Effexor.  I have recommended:    1.  Increase Effexor to 75 mg daily  2.  She will update me in 1 month and if there is no improvement in her headaches I will switch her to Depakote  mg at bedtime  3.  If 2 months of Depakote to ER treatment does not result in any change I will schedule her for Botox.  4.  Prescription for Maxalt was felt  5.  Follow-up in 3 months    Thank you again for this referral, please feel free to contact me if you have any questions.    Luis F Dubose MD  [facility] NEUROLOGY, [place]     HISTORY OF PRESENT ILLNESS     Patient is a 44-year-old female with history of perimenstrual migraine last seen on 2024 for persistent headache and was diagnosed with status migrainosus.  She was treated with prednisone and previously was on Inderal that was discontinued.  Prior to that she had tried  amitriptyline, nortriptyline, Imitrex but continued to be symptomatic and therefore was switched to Emgality and given a prescription for Ubrelvy for abortive treatment.  However patient was unable to afford the cost of both medication and was switched to Effexor and Maxalt.  She has not noticed any change in her headaches with Effexor.  She finds Maxalt to be effective in treating her headaches    Briefly patient is a female with history of migraine headache who was initially evaluated on 8/8/2024 for progressively worsening headache.According to patient she developed migraine headache when she was 4.  She used to get these headaches close to her.  And would take over-the-counter medication.  She was started on amitriptyline, nortriptyline in the past and as she was trying to get pregnant in 2020 she stopped taking those medication.  She eventually got pregnant in 2016 and fortunately had no flareup of her headaches.  She again got pregnant in 2018 but noticed a flareup of her headache and since then has progressively noticed worsening headaches.  These headaches come without any warning.  They are mostly clustered close to her menstrual cycle.  She has some light sensitivity and nausea but denies any focal weakness or numbness.  She usually takes Imitrex and ibuprofen that helps.  She was also tried on Inderal, magnesium, amitriptyline, nortriptyline and Emgality was prescribed along with Ubrelvy but she could not afford the cost and was told to take Effexor in addition to Maxalt.     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    Surgical History:  She  has a past surgical history that includes cystoscopy with right ureteral stent placement (05/25/2022).     SOCIAL HISTORY     Reviewed, and she  reports that she has never smoked. She has never been exposed to tobacco smoke. She has never used smokeless tobacco. She reports that she does not drink alcohol and does not use drugs.     FAMILY HISTORY     Reviewed, and family history includes Brain Cancer in her maternal grandmother; Cancer in her maternal uncle; Early Death in her maternal grandfather; Heart Disease in her maternal grandmother; No Known Problems in her father; Rheumatoid Arthritis in her maternal uncle, maternal uncle, and mother.     ALLERGIES     No Known Allergies      REVIEW OF SYSTEMS     A 12 point review of system was performed and was negative except as outlined in the history of present illness.     HOME MEDICATIONS     Current Outpatient Rx   Medication Sig Dispense Refill     acetaminophen-caffeine (EXCEDRIN TENSION HEADACHE) 500-65 MG TABS Take 1-2 tablets by mouth every 6 hours as needed for mild pain       levonorgestrel-ethinyl estradiol (SEASONALE) 0.15-0.03 MG tablet TAKE 1 TABLET BY MOUTH EVERY DAY 91 tablet 4     magnesium oxide (MAG-OX) 400 MG tablet Take 1 tablet (400 mg) by mouth daily 30 tablet 0     multivitamin, therapeutic (THERA-VIT) TABS tablet Take 1 tablet by mouth daily       rizatriptan (MAXALT-MLT) 10 MG ODT Take 1 tablet (10 mg) by mouth at onset of headache for migraine May repeat in 2 hours. Max 3 tablets/24 hours. 12 tablet 4     venlafaxine (EFFEXOR XR) 37.5 MG 24 hr capsule Take 1 capsule (37.5 mg) by mouth daily 30 capsule 6         PHYSICAL EXAM     Vital signs  /78 (BP Location: Right arm, Patient Position: Sitting)   Pulse 99   Ht 1.778 m (5' 10\")   Wt 65.8 kg (145 lb)   BMI 20.81 kg/m      Weight:   145 lbs 0 oz    Patient is alert and oriented x4 in no acute distress. Vital " signs were reviewed and are documented in electronic medical record. Neck was supple, no carotid bruits, thyromegaly, JVD, or lymphadenopathy was noted.   NEUROLOGY EXAM:    Patient s speech was normal with no aphasia or dysarthria. Mentation, and affect were also normal.     Funduscopic exam was normal, with normal cup to disc ratio. Cranial nerves II -XII were intact.     Patient had normal mass, tone and motor strength was 5/5 in all extremities without pronator drift.     Sensation was intact to light touch, pinprick, and vibratory sensation.     Reflexes were 1+ symmetrical with downgoing toes.     No dysmetria noted on FNF or HKS. Romberg was negative.    Gait testing was normal. Able to walk on toes/heels. Tandem walk normal.     PERTINENT DIAGNOSTIC STUDIES     Following studies were reviewed:     MRI BRAIN 8/3/2024  Unremarkable head MRI     PERTINENT LABS  Following labs were reviewed:  Hospital Outpatient Visit on 09/13/2024   Component Date Value Ref Range Status     LVEF  09/13/2024 55-60%   Final         Total time spent for face to face visit, reviewing labs/imaging studies, counseling and coordination of care was: 30 Minutes spent on the date of the encounter doing chart review, review of outside records, review of test results, interpretation of tests, patient visit, and documentation     The longitudinal plan of care for the diagnosis(es)/condition(s) as documented were addressed during this visit. Due to the added complexity in care, I will continue to support Yeimy in the subsequent management and with ongoing continuity of care.    This note was dictated using voice recognition software.  Any grammatical or context distortions are unintentional and inherent to the software.    No orders of the defined types were placed in this encounter.     New Prescriptions    No medications on file     Modified Medications    No medications on file                 Again, thank you for allowing me to  participate in the care of your patient.        Sincerely,        Luis F Dubose MD

## 2025-01-03 ENCOUNTER — HOSPITAL ENCOUNTER (OUTPATIENT)
Dept: MAMMOGRAPHY | Facility: CLINIC | Age: 45
Discharge: HOME | End: 2025-01-03
Attending: NURSE PRACTITIONER | Admitting: NURSE PRACTITIONER
Payer: COMMERCIAL

## 2025-01-03 DIAGNOSIS — Z12.31 VISIT FOR SCREENING MAMMOGRAM: ICD-10-CM

## 2025-01-03 PROCEDURE — 77063 BREAST TOMOSYNTHESIS BI: CPT

## 2025-02-13 ENCOUNTER — OFFICE VISIT (OUTPATIENT)
Dept: FAMILY MEDICINE | Facility: CLINIC | Age: 45
End: 2025-02-13
Payer: COMMERCIAL

## 2025-02-13 VITALS
RESPIRATION RATE: 16 BRPM | DIASTOLIC BLOOD PRESSURE: 70 MMHG | TEMPERATURE: 98.8 F | WEIGHT: 149 LBS | OXYGEN SATURATION: 99 % | HEART RATE: 107 BPM | BODY MASS INDEX: 21.33 KG/M2 | HEIGHT: 70 IN | SYSTOLIC BLOOD PRESSURE: 130 MMHG

## 2025-02-13 DIAGNOSIS — R21 RASH AND NONSPECIFIC SKIN ERUPTION: Primary | ICD-10-CM

## 2025-02-13 DIAGNOSIS — M54.2 NECK PAIN: ICD-10-CM

## 2025-02-13 RX ORDER — CYCLOBENZAPRINE HCL 5 MG
5 TABLET ORAL 3 TIMES DAILY PRN
Qty: 30 TABLET | Refills: 0 | Status: SHIPPED | OUTPATIENT
Start: 2025-02-13

## 2025-02-13 RX ORDER — TRIAMCINOLONE ACETONIDE 1 MG/G
CREAM TOPICAL 2 TIMES DAILY
Qty: 80 G | Refills: 2 | Status: SHIPPED | OUTPATIENT
Start: 2025-02-13 | End: 2025-02-20

## 2025-02-13 RX ADMIN — CYANOCOBALAMIN 1000 MCG: 1000 INJECTION, SOLUTION INTRAMUSCULAR; SUBCUTANEOUS at 16:21

## 2025-02-13 ASSESSMENT — PAIN SCALES - GENERAL: PAINLEVEL_OUTOF10: NO PAIN (0)

## 2025-02-13 NOTE — PROGRESS NOTES
Assessment & Plan     Rash and nonspecific skin eruption  Almost looks like a contact dermatitis.  Symptoms not necessarily consistent with a fungal infection at this time.  Could possibly be a folliculitis but this is lower on my radar.  Will go ahead and treat like a contact dermatitis at this time with bilateral triamcinolone applied topically for no more than 7 days.  Patient educated to wash her hands after applying.  Will also have patient trial Claritin 20 mg twice daily to see if this helps resolve symptoms.  Patient is educated if there is no improvement within the next 7 days to let us know.  Could consider treating with possible Bactroban to see if this helps relieve symptoms.  If not, would consider then dermatology referral.  - triamcinolone (KENALOG) 0.1 % external cream  Dispense: 80 g; Refill: 2    Neck pain  Ongoing issue for the patient.  No known trauma.  Suspect that this is tension related as the source of pain is achy and at the base of the posterior skull.  She has already tried over-the-counter remedies including Tylenol, ibuprofen, her rizatriptan, ice and heat without relief.  I do recommend massage therapy and trial of cyclobenzaprine as needed.  Patient is educated regarding possible medication side effects.  Patient will reach out to us if there is no improvement in symptoms.  - cyclobenzaprine (FLEXERIL) 5 MG tablet  Dispense: 30 tablet; Refill: 0      Subjective   Yeimy is a 44 year old, presenting for the following health issues:  Derm Problem (Collar bone/shoulders, bilateral hips, itchy, redness, raised (comes and goes), wake pt up at night. Pt states no new soaps, lotions, laundry detergents, medications, diet or exercise. )        2/13/2025     3:49 PM   Additional Questions   Roomed by Alona MENDEZ LPN     History of Present Illness       Reason for visit:  Unknown rash and neck pain. b12 injection  Symptom onset:  More than a month  Symptoms include:  Itchy rash along collar  "bone and lower sides. also have neck pain/tightness  Symptom intensity:  Moderate  Symptom progression:  Staying the same  Had these symptoms before:  No  What makes it worse:  No  What makes it better:  No   She is taking medications regularly.         Rash  Onset/Duration: 2 months ago   Description  Location: bilateral collar bones and on bilateral hips   Character: raised, red, will burn if she scratches it   Itching: moderate  Progression of Symptoms:  same  Accompanying signs and symptoms:   Fever: No  Body aches or joint pain: No  Sore throat symptoms: No  Recent cold symptoms: No  History:           Previous episodes of similar rash: None  New exposures:  None. The only thing that has changed was at the beginning of December the dosage was changed. She did stop the medication for 1 week but there was no improvement in symptoms.   Recent travel: No  Exposure to similar rash: No  Precipitating or alleviating factors: Has tried anti itch benadryl cream - this does help with itching but doesn't do anything in terms of the rash. Tried taking OTC allergy medication which wasn't necessarily helpful.   Therapies tried and outcome: As above. Nothing else has made things better.     Bilateral neck pain at the base of the skull. This has been an on going issue on and off. Notably worsened over the past week. Has tried excedrin, ibuprofen, rizaptriptan without improvement.   Has tried heat and ice as well without improvement. No known trauma to her head or neck.     Review of Systems  Constitutional, HEENT, cardiovascular, pulmonary, gi and gu systems are negative, except as otherwise noted.      Objective    /70 (BP Location: Left arm, Patient Position: Sitting, Cuff Size: Adult Regular)   Pulse 107   Temp 98.8  F (37.1  C) (Oral)   Resp 16   Ht 1.778 m (5' 10\")   Wt 67.6 kg (149 lb)   LMP 01/27/2025 (Approximate)   SpO2 99%   Breastfeeding No   BMI 21.38 kg/m    Body mass index is 21.38 kg/m .  Physical " Exam   GENERAL: alert and no distress  NECK: At the base of the posterior head/superior neck there is significant tightness and tenderness to palpation.  RESP: Normal respiratory effort  MS: no gross musculoskeletal defects noted, no edema  SKIN: Macular papular rash noted across the collarbone and over the bilateral hips.  PSYCH: mentation appears normal, affect normal/bright        Signed Electronically by: Christie Hall PA-C

## 2025-03-30 ENCOUNTER — E-VISIT (OUTPATIENT)
Dept: URGENT CARE | Facility: CLINIC | Age: 45
End: 2025-03-30
Payer: COMMERCIAL

## 2025-03-30 DIAGNOSIS — B96.89 ACUTE BACTERIAL SINUSITIS: Primary | ICD-10-CM

## 2025-03-30 DIAGNOSIS — J01.90 ACUTE BACTERIAL SINUSITIS: Primary | ICD-10-CM

## 2025-03-30 NOTE — PATIENT INSTRUCTIONS
Thank you for choosing us for your care. I have placed an order for a prescription so that you can start treatment:  Orders Placed This Encounter   Medications     amoxicillin-clavulanate (AUGMENTIN) 875-125 MG tablet     Sig: Take 1 tablet by mouth 2 times daily for 7 days.     Dispense:  14 tablet     Refill:  0          View your full visit summary for details by clicking on the link below. Your pharmacist will able to address any questions you may have about the medication.     If you're not feeling better within 5-7 days, please schedule an appointment.  You can schedule an appointment right here in [facility], or call [phone]  If the visit is for the same symptoms as your eVisit, we'll refund the cost of your eVisit if seen within seven days.

## 2025-04-03 ENCOUNTER — TELEPHONE (OUTPATIENT)
Dept: FAMILY MEDICINE | Facility: CLINIC | Age: 45
End: 2025-04-03
Payer: COMMERCIAL

## 2025-04-03 NOTE — TELEPHONE ENCOUNTER
Incoming call from patient    S-(situation): asking if she should come in and be seen. Doesn't want to come if she will be told to just wait longer.     B-(background): e-visit completed 3/30/25. Augmentin was prescribed. Pt is on day 4 today.     A-(assessment): per pt, symptoms are not improving at all, they are heading toward worsening.     Her cough was for hard that she vomit at times.   Still having all the symptoms, no new symptoms. No fever or change of mucous color.     Denies red flags symptoms such as breathing issues or chest pain, coughing up blood.     R-(recommendations): Recommended that she be seen today or tomorrow. She is currently working, there is no opening for today. I scheduled her for tomorrow. She will keep taking the Augmentin until she is seen today. Red flags reviewed. No further questions.      Pay P. RN

## 2025-04-04 ENCOUNTER — ANCILLARY PROCEDURE (OUTPATIENT)
Dept: GENERAL RADIOLOGY | Facility: CLINIC | Age: 45
End: 2025-04-04
Attending: NURSE PRACTITIONER
Payer: COMMERCIAL

## 2025-04-04 DIAGNOSIS — R05.1 ACUTE COUGH: ICD-10-CM

## 2025-04-04 PROCEDURE — 71046 X-RAY EXAM CHEST 2 VIEWS: CPT | Mod: TC | Performed by: RADIOLOGY

## 2025-08-06 ENCOUNTER — TELEPHONE (OUTPATIENT)
Dept: FAMILY MEDICINE | Facility: CLINIC | Age: 45
End: 2025-08-06

## 2025-08-19 ENCOUNTER — OFFICE VISIT (OUTPATIENT)
Dept: FAMILY MEDICINE | Facility: CLINIC | Age: 45
End: 2025-08-19
Payer: COMMERCIAL

## 2025-08-19 ENCOUNTER — RESULTS FOLLOW-UP (OUTPATIENT)
Dept: FAMILY MEDICINE | Facility: CLINIC | Age: 45
End: 2025-08-19

## 2025-08-19 VITALS
SYSTOLIC BLOOD PRESSURE: 115 MMHG | OXYGEN SATURATION: 100 % | TEMPERATURE: 98 F | DIASTOLIC BLOOD PRESSURE: 73 MMHG | BODY MASS INDEX: 21.99 KG/M2 | HEIGHT: 70 IN | WEIGHT: 153.6 LBS | HEART RATE: 78 BPM | RESPIRATION RATE: 16 BRPM

## 2025-08-19 DIAGNOSIS — Z12.4 PAP SMEAR FOR CERVICAL CANCER SCREENING: ICD-10-CM

## 2025-08-19 DIAGNOSIS — M54.2 NECK PAIN: ICD-10-CM

## 2025-08-19 DIAGNOSIS — Z00.00 ROUTINE GENERAL MEDICAL EXAMINATION AT A HEALTH CARE FACILITY: Primary | ICD-10-CM

## 2025-08-19 DIAGNOSIS — N89.8 VAGINAL ITCHING: ICD-10-CM

## 2025-08-19 DIAGNOSIS — G43.009 MIGRAINE WITHOUT AURA AND WITHOUT STATUS MIGRAINOSUS, NOT INTRACTABLE: ICD-10-CM

## 2025-08-19 DIAGNOSIS — R79.89 LOW VITAMIN B12 LEVEL: ICD-10-CM

## 2025-08-19 DIAGNOSIS — R79.89 ELEVATED SERUM CREATININE: ICD-10-CM

## 2025-08-19 PROBLEM — N20.1 URETEROLITHIASIS: Status: RESOLVED | Noted: 2023-08-14 | Resolved: 2025-08-19

## 2025-08-19 LAB
CLUE CELLS: ABNORMAL
TRICHOMONAS, WET PREP: ABNORMAL
WBC'S/HIGH POWER FIELD, WET PREP: ABNORMAL
YEAST, WET PREP: ABNORMAL

## 2025-08-19 PROCEDURE — 3074F SYST BP LT 130 MM HG: CPT | Performed by: NURSE PRACTITIONER

## 2025-08-19 PROCEDURE — 82607 VITAMIN B-12: CPT | Performed by: NURSE PRACTITIONER

## 2025-08-19 PROCEDURE — 36415 COLL VENOUS BLD VENIPUNCTURE: CPT | Performed by: NURSE PRACTITIONER

## 2025-08-19 PROCEDURE — 80048 BASIC METABOLIC PNL TOTAL CA: CPT | Performed by: NURSE PRACTITIONER

## 2025-08-19 PROCEDURE — 87210 SMEAR WET MOUNT SALINE/INK: CPT | Performed by: NURSE PRACTITIONER

## 2025-08-19 PROCEDURE — 99396 PREV VISIT EST AGE 40-64: CPT | Performed by: NURSE PRACTITIONER

## 2025-08-19 PROCEDURE — 87624 HPV HI-RISK TYP POOLED RSLT: CPT | Performed by: NURSE PRACTITIONER

## 2025-08-19 PROCEDURE — 1126F AMNT PAIN NOTED NONE PRSNT: CPT | Performed by: NURSE PRACTITIONER

## 2025-08-19 PROCEDURE — 99214 OFFICE O/P EST MOD 30 MIN: CPT | Mod: 25 | Performed by: NURSE PRACTITIONER

## 2025-08-19 PROCEDURE — 3078F DIAST BP <80 MM HG: CPT | Performed by: NURSE PRACTITIONER

## 2025-08-19 RX ORDER — CYCLOBENZAPRINE HCL 5 MG
5 TABLET ORAL 3 TIMES DAILY PRN
Qty: 30 TABLET | Refills: 0 | Status: SHIPPED | OUTPATIENT
Start: 2025-08-19

## 2025-08-19 RX ORDER — RIZATRIPTAN BENZOATE 10 MG/1
10 TABLET, ORALLY DISINTEGRATING ORAL
Qty: 12 TABLET | Refills: 11 | Status: SHIPPED | OUTPATIENT
Start: 2025-08-19

## 2025-08-19 SDOH — HEALTH STABILITY: PHYSICAL HEALTH: ON AVERAGE, HOW MANY DAYS PER WEEK DO YOU ENGAGE IN MODERATE TO STRENUOUS EXERCISE (LIKE A BRISK WALK)?: 1 DAY

## 2025-08-19 SDOH — HEALTH STABILITY: PHYSICAL HEALTH: ON AVERAGE, HOW MANY MINUTES DO YOU ENGAGE IN EXERCISE AT THIS LEVEL?: 20 MIN

## 2025-08-19 ASSESSMENT — PAIN SCALES - GENERAL: PAINLEVEL_OUTOF10: NO PAIN (0)

## 2025-08-19 ASSESSMENT — ANXIETY QUESTIONNAIRES
1. FEELING NERVOUS, ANXIOUS, OR ON EDGE: NOT AT ALL
2. NOT BEING ABLE TO STOP OR CONTROL WORRYING: NOT AT ALL
8. IF YOU CHECKED OFF ANY PROBLEMS, HOW DIFFICULT HAVE THESE MADE IT FOR YOU TO DO YOUR WORK, TAKE CARE OF THINGS AT HOME, OR GET ALONG WITH OTHER PEOPLE?: NOT DIFFICULT AT ALL
5. BEING SO RESTLESS THAT IT IS HARD TO SIT STILL: NOT AT ALL
3. WORRYING TOO MUCH ABOUT DIFFERENT THINGS: NOT AT ALL
GAD7 TOTAL SCORE: 0
7. FEELING AFRAID AS IF SOMETHING AWFUL MIGHT HAPPEN: NOT AT ALL
GAD7 TOTAL SCORE: 0
7. FEELING AFRAID AS IF SOMETHING AWFUL MIGHT HAPPEN: NOT AT ALL
GAD7 TOTAL SCORE: 0
4. TROUBLE RELAXING: NOT AT ALL
6. BECOMING EASILY ANNOYED OR IRRITABLE: NOT AT ALL
IF YOU CHECKED OFF ANY PROBLEMS ON THIS QUESTIONNAIRE, HOW DIFFICULT HAVE THESE PROBLEMS MADE IT FOR YOU TO DO YOUR WORK, TAKE CARE OF THINGS AT HOME, OR GET ALONG WITH OTHER PEOPLE: NOT DIFFICULT AT ALL

## 2025-08-19 ASSESSMENT — PATIENT HEALTH QUESTIONNAIRE - PHQ9
SUM OF ALL RESPONSES TO PHQ QUESTIONS 1-9: 4
SUM OF ALL RESPONSES TO PHQ QUESTIONS 1-9: 4
10. IF YOU CHECKED OFF ANY PROBLEMS, HOW DIFFICULT HAVE THESE PROBLEMS MADE IT FOR YOU TO DO YOUR WORK, TAKE CARE OF THINGS AT HOME, OR GET ALONG WITH OTHER PEOPLE: NOT DIFFICULT AT ALL

## 2025-08-19 ASSESSMENT — SOCIAL DETERMINANTS OF HEALTH (SDOH): HOW OFTEN DO YOU GET TOGETHER WITH FRIENDS OR RELATIVES?: TWICE A WEEK

## 2025-08-20 LAB
ANION GAP SERPL CALCULATED.3IONS-SCNC: 13 MMOL/L (ref 7–15)
BUN SERPL-MCNC: 11.4 MG/DL (ref 6–20)
CALCIUM SERPL-MCNC: 10 MG/DL (ref 8.8–10.4)
CHLORIDE SERPL-SCNC: 102 MMOL/L (ref 98–107)
CREAT SERPL-MCNC: 0.84 MG/DL (ref 0.51–0.95)
EGFRCR SERPLBLD CKD-EPI 2021: 87 ML/MIN/1.73M2
GLUCOSE SERPL-MCNC: 89 MG/DL (ref 70–99)
HCO3 SERPL-SCNC: 26 MMOL/L (ref 22–29)
HPV HR 12 DNA CVX QL NAA+PROBE: NEGATIVE
HPV16 DNA CVX QL NAA+PROBE: NEGATIVE
HPV18 DNA CVX QL NAA+PROBE: NEGATIVE
HUMAN PAPILLOMA VIRUS FINAL DIAGNOSIS: NORMAL
POTASSIUM SERPL-SCNC: 4.2 MMOL/L (ref 3.4–5.3)
SODIUM SERPL-SCNC: 141 MMOL/L (ref 135–145)
VIT B12 SERPL-MCNC: 345 PG/ML (ref 232–1245)

## 2025-08-25 ENCOUNTER — TELEPHONE (OUTPATIENT)
Dept: FAMILY MEDICINE | Facility: CLINIC | Age: 45
End: 2025-08-25
Payer: COMMERCIAL